# Patient Record
Sex: MALE | Race: WHITE | NOT HISPANIC OR LATINO
[De-identification: names, ages, dates, MRNs, and addresses within clinical notes are randomized per-mention and may not be internally consistent; named-entity substitution may affect disease eponyms.]

---

## 2020-01-01 ENCOUNTER — RESULT REVIEW (OUTPATIENT)
Age: 65
End: 2020-01-01

## 2020-01-01 ENCOUNTER — NON-APPOINTMENT (OUTPATIENT)
Age: 65
End: 2020-01-01

## 2020-01-01 ENCOUNTER — APPOINTMENT (OUTPATIENT)
Dept: HEMATOLOGY ONCOLOGY | Facility: CLINIC | Age: 65
End: 2020-01-01
Payer: COMMERCIAL

## 2020-01-01 ENCOUNTER — APPOINTMENT (OUTPATIENT)
Dept: RADIATION ONCOLOGY | Facility: CLINIC | Age: 65
End: 2020-01-01
Payer: COMMERCIAL

## 2020-01-01 VITALS
HEIGHT: 70 IN | SYSTOLIC BLOOD PRESSURE: 116 MMHG | BODY MASS INDEX: 33.21 KG/M2 | TEMPERATURE: 96.8 F | OXYGEN SATURATION: 98 % | WEIGHT: 232 LBS | DIASTOLIC BLOOD PRESSURE: 67 MMHG | RESPIRATION RATE: 18 BRPM | HEART RATE: 86 BPM

## 2020-01-01 VITALS
HEART RATE: 88 BPM | SYSTOLIC BLOOD PRESSURE: 130 MMHG | BODY MASS INDEX: 32.64 KG/M2 | TEMPERATURE: 97.8 F | DIASTOLIC BLOOD PRESSURE: 88 MMHG | HEIGHT: 70 IN | RESPIRATION RATE: 18 BRPM | OXYGEN SATURATION: 98 % | WEIGHT: 228 LBS

## 2020-01-01 PROCEDURE — 99214 OFFICE O/P EST MOD 30 MIN: CPT

## 2020-01-01 PROCEDURE — 99072 ADDL SUPL MATRL&STAF TM PHE: CPT

## 2020-01-01 PROCEDURE — 99215 OFFICE O/P EST HI 40 MIN: CPT

## 2020-07-13 ENCOUNTER — APPOINTMENT (OUTPATIENT)
Dept: HEART AND VASCULAR | Facility: CLINIC | Age: 65
End: 2020-07-13
Payer: COMMERCIAL

## 2020-07-13 VITALS — DIASTOLIC BLOOD PRESSURE: 84 MMHG | SYSTOLIC BLOOD PRESSURE: 142 MMHG | HEART RATE: 86 BPM

## 2020-07-13 DIAGNOSIS — I10 ESSENTIAL (PRIMARY) HYPERTENSION: ICD-10-CM

## 2020-07-13 PROBLEM — Z00.00 ENCOUNTER FOR PREVENTIVE HEALTH EXAMINATION: Status: ACTIVE | Noted: 2020-07-13

## 2020-07-13 PROCEDURE — 99205 OFFICE O/P NEW HI 60 MIN: CPT

## 2020-07-13 RX ORDER — LOSARTAN POTASSIUM AND HYDROCHLOROTHIAZIDE 25; 100 MG/1; MG/1
100-25 TABLET ORAL DAILY
Qty: 30 | Refills: 3 | Status: ACTIVE | COMMUNITY
Start: 2020-07-13 | End: 1900-01-01

## 2020-07-13 NOTE — PHYSICAL EXAM
[General Appearance - Well Developed] : well developed [Normal Appearance] : normal appearance [Well Groomed] : well groomed [General Appearance - Well Nourished] : well nourished [No Deformities] : no deformities [General Appearance - In No Acute Distress] : no acute distress [Normal Conjunctiva] : the conjunctiva exhibited no abnormalities [Eyelids - No Xanthelasma] : the eyelids demonstrated no xanthelasmas [Normal Oral Mucosa] : normal oral mucosa [No Oral Pallor] : no oral pallor [No Oral Cyanosis] : no oral cyanosis [Normal Jugular Venous A Waves Present] : normal jugular venous A waves present [Normal Jugular Venous V Waves Present] : normal jugular venous V waves present [No Jugular Venous Borrero A Waves] : no jugular venous borrero A waves [Heart Rate And Rhythm] : heart rate and rhythm were normal [Heart Sounds] : normal S1 and S2 [Murmurs] : no murmurs present [Respiration, Rhythm And Depth] : normal respiratory rhythm and effort [Exaggerated Use Of Accessory Muscles For Inspiration] : no accessory muscle use [Auscultation Breath Sounds / Voice Sounds] : lungs were clear to auscultation bilaterally [Abdomen Soft] : soft [Abdomen Tenderness] : non-tender [Abdomen Mass (___ Cm)] : no abdominal mass palpated [Abnormal Walk] : normal gait [Gait - Sufficient For Exercise Testing] : the gait was sufficient for exercise testing [Skin Color & Pigmentation] : normal skin color and pigmentation [No Venous Stasis] : no venous stasis [Skin Lesions] : no skin lesions [No Skin Ulcers] : no skin ulcer [No Xanthoma] : no  xanthoma was observed [Oriented To Time, Place, And Person] : oriented to person, place, and time [Affect] : the affect was normal [Mood] : the mood was normal [No Anxiety] : not feeling anxious [Nail Clubbing] : no clubbing of the fingernails [Cyanosis, Localized] : no localized cyanosis [Petechial Hemorrhages (___cm)] : no petechial hemorrhages [] : no ischemic changes

## 2020-07-20 NOTE — HISTORY OF PRESENT ILLNESS
[FreeTextEntry1] : 64 yo male with pmhx of htn and new RUL lung nodule X2  is here for initial visit with this provider for lightheadedness and palpitations.  \par \par Patient reports palpitations a couple of time a week. States it feels like its out of rhythm and lasts about a half an hour. Palpitations often relieved when he drinks a glass of wine.\par Reports feeling week and tired the past six weeks. \par Feels lightheaded approximately once a week when he first gets up, resolves quickly. \par Denies CP/SOB/syncope/orthopnea/LE edema\par Can walk  30-40 minutes a day without stopping. \par \par Is awaiting scheduling for biopsy of new RUL nodule. \par  \par Holter monitor ten years ago. \par \par \par \par PMHX\par lung nodule\par htn\par hld\par \par PSHx\par Right side hernia repair\par \par ALL\par none\par \par MEDS\par losartan hctz 50/12.5\par losartan 50mg daily (in addition to combo)\par atorvastatin 10mg daily\par \par FH\par father prostate ca\par mother dementia\par \par SH\par  1PPD X 40 years;quit four months ago\par Social ETOH\par No illicit drugs\par Works in IT\par \par EKG 7/13/2020\par SR with frequent PVC's, ventricular bigeminy, non specific ST depression\par \par Echo 6/30/2020\par 1. Probable normal left ventricular size and function.\par 2.Normal right ventricular size and function.\par 3. No pericardial effusion\par 4.Mild LVH\par 5.Insufficient TR to measure pulmonary pressures\par \par Carotid Ultrasound Duplex 7/13/2020\par 1.Mild plaque with no evidence of significant stenosis\par \par Labs 6/4/2020\par CR 1.03\par TSH 0.705\par \par Lipids 6/4/2020\par Tri 63\par Total chol 166\par HDL 59\par LDL 94\par

## 2020-07-20 NOTE — ASSESSMENT
[FreeTextEntry1] : 64 yo male with pmhx of htn and new RUL lung nodule X2  is here for initial visit with this provider for lightheadedness and palpitations.\par \par PALPITATIONS\par -in setting of symptoms, will order a Holter monitor for 72 hours to exclude arrhythmia, particularly in the setting of bigemini on EKG\par -recent echo reviewed, mild LVH\par -recommend to limit the amount of caffeine \par -TSH 0.705\par \par CAD \par -patient has multiple risk factors\par -echocardiogram is WNL\par -baseline EKG is NSR with frequent PVC's, ventricular bigeminy, non specific ST depression\par -continue atorvastatin 10mg daily\par -will obtain nuclear stress test to stratify for CAD, but will have a very low threshold for cardiac catheterization in the setting of risk factors and bigemini of EKG\par -recommend to call clinic immediately if onset of chest pain\par \par HTN\par -uncontrolled today\par -stop losartan 50\par -stop losartan/hctz 50/12.5\par -start losartan/ hctz to 100/25mg daily.\par -will consider adding third agent if BP not better controlled on next visit\par \par HLD\par -Lipid panel reviewed, WNL as above\par -recommended healthy diet and exercise\par -carotid ultrasound reviewed, WNL as above\par -continue atorvastatin 10mg daily\par \par f/u in 2 weeks for test results and BP check\par

## 2020-08-05 VITALS — BODY MASS INDEX: 33.46 KG/M2 | WEIGHT: 247 LBS | HEIGHT: 72 IN

## 2020-08-05 PROBLEM — Z87.891 FORMER HEAVY TOBACCO SMOKER: Status: ACTIVE | Noted: 2020-08-05

## 2020-08-05 PROBLEM — Z86.39 HISTORY OF HYPERLIPIDEMIA: Status: RESOLVED | Noted: 2020-08-05 | Resolved: 2020-08-05

## 2020-08-05 PROBLEM — Z87.438 HISTORY OF BENIGN PROSTATIC HYPERPLASIA: Status: RESOLVED | Noted: 2020-08-05 | Resolved: 2020-08-05

## 2020-08-05 PROBLEM — Z86.79 HISTORY OF ESSENTIAL HYPERTENSION: Status: RESOLVED | Noted: 2020-08-05 | Resolved: 2020-08-05

## 2020-08-10 ENCOUNTER — APPOINTMENT (OUTPATIENT)
Dept: THORACIC SURGERY | Facility: CLINIC | Age: 65
End: 2020-08-10
Payer: COMMERCIAL

## 2020-08-10 ENCOUNTER — TRANSCRIPTION ENCOUNTER (OUTPATIENT)
Age: 65
End: 2020-08-10

## 2020-08-10 DIAGNOSIS — Z86.39 PERSONAL HISTORY OF OTHER ENDOCRINE, NUTRITIONAL AND METABOLIC DISEASE: ICD-10-CM

## 2020-08-10 DIAGNOSIS — Z87.438 PERSONAL HISTORY OF OTHER DISEASES OF MALE GENITAL ORGANS: ICD-10-CM

## 2020-08-10 DIAGNOSIS — Z86.79 PERSONAL HISTORY OF OTHER DISEASES OF THE CIRCULATORY SYSTEM: ICD-10-CM

## 2020-08-10 DIAGNOSIS — Z87.891 PERSONAL HISTORY OF NICOTINE DEPENDENCE: ICD-10-CM

## 2020-08-10 PROCEDURE — 99205 OFFICE O/P NEW HI 60 MIN: CPT

## 2020-08-11 NOTE — PHYSICAL EXAM
[Examination Of The Chest] : the chest was normal in appearance [Chest Visual Inspection Thoracic Asymmetry] : no chest asymmetry [Diminished Respiratory Excursion] : normal chest expansion

## 2020-08-11 NOTE — HISTORY OF PRESENT ILLNESS
[FreeTextEntry1] : 64 y/o M pmhx HTN, HLD, BPH, former smoker (quit 2 months ago) referred to our office for surgical evaluation  after CT chest revealed 2 RUL nodules (measuring 1.3 cm and 1.1 cm).  \par \par He initially underwent screening CXR  because of his smoking history which showed RUL lung nodule.  He subsequently underwent CT chest & PET demonstrating the hypermetabolic nodules and was referred\par \par patient is asymptomatic however former smoker.  Denies weight loss, weakness, fever, chills, decreased appetite, sob, hemoptysis

## 2020-08-11 NOTE — ASSESSMENT
[FreeTextEntry1] : 66 y/o M with new finding of hypermetabolic lung nodules.  Patient is a former smoker and there is high suspicion for malignancy.  On pet ct, there is hilar uptake concerning for n1 lymph node.  We discussed options of surgery v. IR biopsy.  This lesion is not amenable to wedge resection, I would like a diagnosis before surgical excision if possible.  At this time, patient has been instructed to undergo IR guided needle biopsy at [place] to determine pathologic diagnosis.  I have discussed it with the interventional radiologist.  Patient is extremely anxious and will be prescribed lorazepam for a short time.  HE will follow up in our office with the results of this biopsy.  At that time, we will determine options for surgical management.  He will need pulmonary function tests as well as cardiac clearance.  He can contact our office with any questions or concerns going forward.

## 2020-08-11 NOTE — DATA REVIEWED
[FreeTextEntry1] : 6/23/2020\par PET/CT:  Mild focal uptake in the 2 adjacent pulmonary nodules in the posterior RUL (max SUV 4.7).  No additional FDG avid pulmonary nodules identified.  Focally avid right hilar lymph node (max SUV 8.6).  No abnormal uptake in the nonenlarged precarinal lymph node.  Moderate sized hiatal hernia.  No evidence of distant metastases\par \par 5/28/2020\par CT Chest:  Moderate to extensive centrilobular paraseptal emphysema predominantly within bilateral upper lobes.  There are 2 adjacent nodular densities noted within the RUL measuring 1.3 cm and 1.1 cm.  Evaluation of the rest of the lung fields demonstrates mild subsegmental atelectasis and scaring involving the lingula.  There is a 0.2 cm peripheral nodule note in the RLL . Also, there is a 0.2 cm nodule noted involving the superior segment of the RLL.  There is also a central nodule noted involving the EMBER which measures 0.3 cm in sized.  There are no spiculated nodules.  There is no consolidation.

## 2020-08-21 ENCOUNTER — RESULT REVIEW (OUTPATIENT)
Age: 65
End: 2020-08-21

## 2020-08-23 ENCOUNTER — RESULT REVIEW (OUTPATIENT)
Age: 65
End: 2020-08-23

## 2020-08-24 ENCOUNTER — RESULT REVIEW (OUTPATIENT)
Age: 65
End: 2020-08-24

## 2020-08-31 ENCOUNTER — RESULT REVIEW (OUTPATIENT)
Age: 65
End: 2020-08-31

## 2020-08-31 ENCOUNTER — APPOINTMENT (OUTPATIENT)
Dept: THORACIC SURGERY | Facility: CLINIC | Age: 65
End: 2020-08-31
Payer: COMMERCIAL

## 2020-08-31 ENCOUNTER — APPOINTMENT (OUTPATIENT)
Dept: HEMATOLOGY ONCOLOGY | Facility: CLINIC | Age: 65
End: 2020-08-31
Payer: COMMERCIAL

## 2020-08-31 PROCEDURE — 99214 OFFICE O/P EST MOD 30 MIN: CPT

## 2020-08-31 PROCEDURE — 99205 OFFICE O/P NEW HI 60 MIN: CPT

## 2020-08-31 RX ORDER — AMLODIPINE BESYLATE 5 MG/1
5 TABLET ORAL
Refills: 0 | Status: ACTIVE | COMMUNITY

## 2020-08-31 NOTE — PHYSICAL EXAM
[Sclera] : the sclera and conjunctiva were normal [PERRL With Normal Accommodation] : pupils were equal in size, round, and reactive to light [Extraocular Movements] : extraocular movements were intact [Neck Appearance] : the appearance of the neck was normal [Neck Cervical Mass (___cm)] : no neck mass was observed [Jugular Venous Distention Increased] : there was no jugular-venous distention [Thyroid Diffuse Enlargement] : the thyroid was not enlarged [Thyroid Nodule] : there were no palpable thyroid nodules [Heart Rate And Rhythm] : heart rate was normal and rhythm regular [Auscultation Breath Sounds / Voice Sounds] : lungs were clear to auscultation bilaterally [Heart Sounds Gallop] : no gallops [Heart Sounds] : normal S1 and S2 [Murmurs] : no murmurs [Heart Sounds Pericardial Friction Rub] : no pericardial rub [Abdomen Soft] : soft [Bowel Sounds] : normal bowel sounds [Abdomen Tenderness] : non-tender [Cervical Lymph Nodes Enlarged Posterior Bilaterally] : posterior cervical [Abdomen Mass (___ Cm)] : no abdominal mass palpated [Cervical Lymph Nodes Enlarged Anterior Bilaterally] : anterior cervical [Axillary Lymph Nodes Enlarged Bilaterally] : axillary [Supraclavicular Lymph Nodes Enlarged Bilaterally] : supraclavicular [Femoral Lymph Nodes Enlarged Bilaterally] : femoral [Inguinal Lymph Nodes Enlarged Bilaterally] : inguinal [No CVA Tenderness] : no ~M costovertebral angle tenderness [No Spinal Tenderness] : no spinal tenderness [Abnormal Walk] : normal gait [Nail Clubbing] : no clubbing  or cyanosis of the fingernails [Musculoskeletal - Swelling] : no joint swelling seen [Motor Tone] : muscle strength and tone were normal [Skin Color & Pigmentation] : normal skin color and pigmentation [Skin Turgor] : normal skin turgor [] : no rash [Oriented To Time, Place, And Person] : oriented to person, place, and time [Impaired Insight] : insight and judgment were intact [Affect] : the affect was normal

## 2020-08-31 NOTE — ASSESSMENT
[FreeTextEntry1] : Small cell lung cancer\par Likely limited stage pending brain MRI\par At least Stage IIB\par PET/CT (6/20) No distant metastases\par \par Discussed at length about the diagnosis, work up, staging, treatment options and prognosis\par Explained that he is going to have a good response to the treatment, however there is very high chance of recurrence. And 5 year survival is <20%\par He and his wife had multiple questions which were answered to satisfaction\par Plan\par BRain MRI\par Chemoport placement. Hold ASA 5 days prior to the port placement\par Refer to rad onc\par Cisplatin/carboplatin with etoposide Q3W x 4-6 treatment\par Given the lapse since the initial findings, will begin chemotherapy. Radiation can begin as early as feasible\par Refer for audiometry

## 2020-08-31 NOTE — CONSULT LETTER
[Dear  ___] : Dear  [unfilled], [Consult Letter:] : I had the pleasure of evaluating your patient, [unfilled]. [Please see my note below.] : Please see my note below. [Consult Closing:] : Thank you very much for allowing me to participate in the care of this patient.  If you have any questions, please do not hesitate to contact me. [Sincerely,] : Sincerely, [DrSudhakar  ___] : Dr. GOLDMAN [FreeTextEntry3] : Javier Sanabria MD, MPH\par Attending Physician\par Hematology Oncology\par [facility] [place]\par [facility]\par

## 2020-08-31 NOTE — HISTORY OF PRESENT ILLNESS
[de-identified] : Mr Potter is a very pleasant 65 years ex smoker (quit 6 months ago, 1 ppd x 40 years), seen today for further management of his small cell lung cancer\par \par He reports "flu like symptoms" starting in March 2020, saw his PCP who obtained a CXR which showed a lung nodule, subsequently had  CT chest revealed 2 RUL nodules (measuring 1.3 cm and 1.1 cm). He was sent to [facility] for biopsy, He reports that his procedure was postponed for multiple weeks, and eventually he was told that he need to see a thoracic surgeon\par He saw Dr Sarthak Fernández, who referred him to IR for image guided biopsy\par \par CT Chest 5/28/2020: Moderate to extensive centrilobular paraseptal emphysema predominantly within bilateral upper lobes. There are 2 adjacent nodular densities noted within the RUL measuring 1.3 cm and 1.1 cm. Evaluation of the rest of the lung fields demonstrates mild subsegmental atelectasis and scaring involving the lingula. There is a 0.2 cm peripheral nodule note in the RLL. Also, there is a 0.2 cm nodule noted involving the superior segment of the RLL. There is also a central nodule noted involving the EMBER which measures 0.3 cm in sized. There are no spiculated nodules. There is no consolidation. \par \par PET/CT 6/23/2020: Mild focal uptake in the 2 adjacent pulmonary nodules in the posterior RUL (max SUV 4.7). No additional FDG avid pulmonary nodules identified. Focally avid right hilar lymph node (max SUV 8.6). No abnormal uptake in the nonenlarged precarinal lymph node. Moderate sized hiatal hernia. No evidence of distant metastases\par \par s/p core biopsy of a right upper lobe lung mass (8/24/20)\par Pathology\par Small cell carcinoma. 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 He also reports that his house is from early 1900s and may have asbestos in the basement\par \par No fevers chills night sweats\par No fatigue, tiredness, apathy\par No appetite loss or weight loss/weight gain\par No chest pain, shortness of breath, palpitation, dizziness\par No abdominal pain, nausea, vomiting, diarrhea, constipation\par No bright red blood per rectum, hematemesis or melena\par No hematuria, dysuria, frequency, urgency\par No headaches, visual changes, focal weakness, tingling, numbness\par

## 2020-08-31 NOTE — PHYSICAL EXAM
[Restricted in physically strenuous activity but ambulatory and able to carry out work of a light or sedentary nature] : Status 1- Restricted in physically strenuous activity but ambulatory and able to carry out work of a light or sedentary nature, e.g., light house work, office work [Normal] : grossly intact [de-identified] : Anxious

## 2020-09-01 NOTE — DATA REVIEWED
[FreeTextEntry1] : IR guided biopsy 8/24/2020:  RUL  (+) small cell carcinoma.  \par \par 6/23/2020\par PET/CT: Mild focal uptake in the 2 adjacent pulmonary nodules in the posterior RUL (max SUV 4.7). No additional FDG avid pulmonary nodules identified. Focally avid right hilar lymph node (max SUV 8.6). No abnormal uptake in the nonenlarged precarinal lymph node. Moderate sized hiatal hernia. No evidence of distant metastases\par \par 5/28/2020\par CT Chest: Moderate to extensive centrilobular paraseptal emphysema predominantly within bilateral upper lobes. There are 2 adjacent nodular densities noted within the RUL measuring 1.3 cm and 1.1 cm. Evaluation of the rest of the lung fields demonstrates mild subsegmental atelectasis and scaring involving the lingula. There is a 0.2 cm peripheral nodule note in the RLL. Also, there is a 0.2 cm nodule noted involving the superior segment of the RLL. There is also a central nodule noted involving the EMBER which measures 0.3 cm in sized. There are no spiculated nodules. There is no consolidation.

## 2020-09-01 NOTE — CONSULT LETTER
[Dear  ___] : Dear  [unfilled], [Consult Letter:] : I had the pleasure of evaluating your patient, [unfilled]. [Please see my note below.] : Please see my note below. [Consult Closing:] : Thank you very much for allowing me to participate in the care of this patient.  If you have any questions, please do not hesitate to contact me. [Sincerely,] : Sincerely, [DrSudhakar  ___] : Dr. GOLDMAN [FreeTextEntry3] : Sarthak Barnes MD\par Thoracic Surgery\par [facility]

## 2020-09-01 NOTE — ASSESSMENT
[FreeTextEntry1] : 66 y/o M pmhx HTN, HLD, BPH, former smoker, recently diagnosed with small cell lung ca of right upper lobe. PET scan in June demonstrated FDG avid right hilar lymph node representing metastatic disease.  Patient has been referred to oncologist for medical management.  He will be scheduled for repeat PET and Brain MRI for further staging. He can contact our office with any questions or concerns going forward.

## 2020-09-01 NOTE — HISTORY OF PRESENT ILLNESS
[FreeTextEntry1] : 64 y/o M pmhx HTN, HLD, BPH, former smoker (quit 2 months ago) referred to our office for surgical evaluation after CT chest revealed 2 RUL nodules (measuring 1.3 cm and 1.1 cm). \par \par He initially presented to our office on Aug 10 after screening CXR showed RUL lung nodule. He subsequently underwent CT chest & PET demonstrating the hypermetabolic nodules. At that appointment patient was  patient has been instructed to undergo IR guided needle biopsy to determine pathologic diagnosis.  He had IR guided biopsy on 8/24 which demonstrated small cell lung ca. \par \par He now presents to the office to discuss further management.  Patient is asymptomatic however former smoker. Denies weight loss, weakness, fever, chills, decreased appetite, sob, hemoptysis  I, Gabriel Hoyos, performed the initial face to face bedside interview with this patient regarding history of present illness, review of symptoms and relevant past medical, social and family history.  I completed an independent physical examination.  I was the initial provider who evaluated this patient.  The history, relevant review of systems, past medical and surgical history, medical decision making, and physical examination was documented by the scribe in my presence and I attest to the accuracy of the documentation.

## 2020-09-03 ENCOUNTER — APPOINTMENT (OUTPATIENT)
Dept: RADIATION ONCOLOGY | Facility: CLINIC | Age: 65
End: 2020-09-03
Payer: COMMERCIAL

## 2020-09-03 DIAGNOSIS — Z80.42 FAMILY HISTORY OF MALIGNANT NEOPLASM OF PROSTATE: ICD-10-CM

## 2020-09-03 PROCEDURE — 99205 OFFICE O/P NEW HI 60 MIN: CPT | Mod: 95

## 2020-09-08 ENCOUNTER — RESULT REVIEW (OUTPATIENT)
Age: 65
End: 2020-09-08

## 2020-09-09 NOTE — HISTORY OF PRESENT ILLNESS
[Home] : at home, [unfilled] , at the time of the visit. [Medical Office: ([facility])___] : at the medical office located in  [Verbal consent obtained from patient] : the patient, [unfilled] [FreeTextEntry1] : \par Mr. Potter is a 65 year old male, who was recently diagnosed with limited stage small cell carcinoma, he is being referred for a consultation to discuss the role of radiation therapy and the management of the disease. \par \par Mr. Potter initially presented to his PCP with complaints of flu like symptoms.  Further evaluation with a screening CXR (report unavailable) demonstrated a nodule in the right upper lobe (RUL). CT chest (5/28/20) revealed a few parenchymal nodules with the largest one located in the posterior RUL, measuring 1.3 cm and 1.1 cm.  PET/CT (6/23/20) demonstrated 2 adjacent pulmonary nodules in the RUL (SUV 4.7). FDG avid right hilar lymph node (max SUV 8.6). No evidence of distant metastases. Mr. Potter was scheduled to undergo a biopsy of the nodules at [facility], however, due to COVID his biopsy was cancelled. He presented to [place] and a CT guided biopsy was performed (8/24/20) and pathology revealed small cell carcinoma. MRI Brain on 9/8 was negative for any evidence of metastatic disease. \par \par He was referred to Dr. Sanabria for further management and the plan is to give concurrent chemoradiation using Cisplatin/Carboplatin with etoposide every 3 weeks x 4-6 cycles. The first cycle is planned for 9/16. He is an ex smoker, 1 - 1.5 ppd x 40 years and quit in March 2020.  Mr. Potter notes that his appetite is fair and reports no weight loss. He feels overwhelmed and anxious at times given his diagnosis. He also notes that he is drinking more alcohol since the diagnosis.  Mr Potter is being seen via telehealth to further discuss management with radiation therapy.

## 2020-09-09 NOTE — LETTER CLOSING
[Sincerely yours,] : Sincerely yours, [Consult Closing:] : Thank you for allowing me to participate in the care of this patient.  If you have any questions, please do not hesitate to contact me. [FreeTextEntry3] : Nadine Hidalgo MD\par

## 2020-09-09 NOTE — SOCIAL HISTORY
[Former  Cigarette ___ Pack(s) per day] : former cigarette pack(s) per day:  [unfilled]  [Date (Year) Stopped: _____] : Date (Year) Stopped: [unfilled]  [de-identified] : quit 6 months ago

## 2020-09-09 NOTE — DISEASE MANAGEMENT
[Clinical] : TNM Stage: c [IIB] : IIB [NTNM] : 1 [FreeTextEntry4] : Limited stage  [TTNM] : 1b [MTNM] : X

## 2020-09-10 ENCOUNTER — APPOINTMENT (OUTPATIENT)
Dept: RADIATION ONCOLOGY | Facility: CLINIC | Age: 65
End: 2020-09-10
Payer: COMMERCIAL

## 2020-09-10 VITALS
DIASTOLIC BLOOD PRESSURE: 89 MMHG | BODY MASS INDEX: 33.86 KG/M2 | SYSTOLIC BLOOD PRESSURE: 146 MMHG | OXYGEN SATURATION: 98 % | WEIGHT: 250 LBS | RESPIRATION RATE: 18 BRPM | HEIGHT: 72 IN | HEART RATE: 84 BPM

## 2020-09-10 PROCEDURE — 99214 OFFICE O/P EST MOD 30 MIN: CPT

## 2020-09-14 NOTE — HISTORY OF PRESENT ILLNESS
[FreeTextEntry1] : \par Mr Potter is a 65 year old male recently diagnosed with limited stage small cell carcinoma, present today to proceed with radiation therapy planning. \par \par He had a CXR (report unavailable) that demonstrated a nodule in the right upper lobe (RUL). CT chest (5/28/20) revealed a few parenchymal nodules with the largest one located in the posterior RUL, measuring 1.3 cm and 1.1 cm.  PET/CT (6/23/20) demonstrated 2 adjacent pulmonary nodules in the RUL (SUV 4.7). FDG avid right hilar lymph node (max SUV 8.6). No evidence of distant metastases.  CT guided biopsy was performed (8/24/20) and pathology revealed small cell carcinoma. MRI Brain on 9/8 was negative for any evidence of metastatic disease. The plan is for Cisplatin/Carboplatin with etoposide every 3 weeks x 4-6 cycles, first cycle on 9/16/20 to be given with concurrent radiation.  He reports a recent decrease in appetite but no weight loss. He attributes it to his anxiety of the diagnosis. He otherwise offers no new symptoms or complaints. He is present today for physical exam status post telehealth and CT simulation.

## 2020-09-14 NOTE — SOCIAL HISTORY
[Former  Cigarette ___ Pack(s) per day] : former cigarette pack(s) per day:  [unfilled]  [Date (Year) Stopped: _____] : Date (Year) Stopped: [unfilled]  [de-identified] : quit 6 months ago

## 2020-09-14 NOTE — DISEASE MANAGEMENT
[Clinical] : TNM Stage: c [IIB] : IIB [FreeTextEntry4] : Limited stage  [TTNM] : 1b [NTNM] : 1 [MTNM] : X

## 2020-09-14 NOTE — PHYSICAL EXAM
[] : no respiratory distress [Respiration, Rhythm And Depth] : normal respiratory rhythm and effort [Auscultation Breath Sounds / Voice Sounds] : lungs were clear to auscultation bilaterally [Normal] : no focal deficits

## 2020-09-16 ENCOUNTER — APPOINTMENT (OUTPATIENT)
Dept: HEMATOLOGY ONCOLOGY | Facility: CLINIC | Age: 65
End: 2020-09-16
Payer: COMMERCIAL

## 2020-09-16 VITALS
BODY MASS INDEX: 33.86 KG/M2 | TEMPERATURE: 97.9 F | HEART RATE: 87 BPM | DIASTOLIC BLOOD PRESSURE: 81 MMHG | RESPIRATION RATE: 18 BRPM | OXYGEN SATURATION: 98 % | HEIGHT: 71.97 IN | SYSTOLIC BLOOD PRESSURE: 143 MMHG | WEIGHT: 250 LBS

## 2020-09-16 PROCEDURE — 99215 OFFICE O/P EST HI 40 MIN: CPT

## 2020-09-16 NOTE — CONSULT LETTER
[Dear  ___] : Dear  [unfilled], [Please see my note below.] : Please see my note below. [Consult Closing:] : Thank you very much for allowing me to participate in the care of this patient.  If you have any questions, please do not hesitate to contact me. [Sincerely,] : Sincerely, [DrSudhakar  ___] : Dr. GOLDMAN [Courtesy Letter:] : I had the pleasure of seeing your patient, [unfilled], in my office today. [FreeTextEntry3] : Javier Sanabria MD, MPH\par Attending Physician\par Hematology Oncology\par [facility] [place]\par [facility]\par

## 2020-09-16 NOTE — HISTORY OF PRESENT ILLNESS
[de-identified] : Mr Potter is a very pleasant 65 years ex smoker (quit 6 months ago, 1 ppd x 40 years), seen today for further management of his small cell lung cancer\par \par He reports "flu like symptoms" starting in March 2020, saw his PCP who obtained a CXR which showed a lung nodule, subsequently had  CT chest revealed 2 RUL nodules (measuring 1.3 cm and 1.1 cm). He was sent to [facility] for biopsy, He reports that his procedure was postponed for multiple weeks, and eventually he was told that he need to see a thoracic surgeon\par He saw Dr Sarthak Fernández, who referred him to IR for image guided biopsy\par \par CT Chest 5/28/2020: Moderate to extensive centrilobular paraseptal emphysema predominantly within bilateral upper lobes. There are 2 adjacent nodular densities noted within the RUL measuring 1.3 cm and 1.1 cm. Evaluation of the rest of the lung fields demonstrates mild subsegmental atelectasis and scaring involving the lingula. There is a 0.2 cm peripheral nodule note in the RLL. Also, there is a 0.2 cm nodule noted involving the superior segment of the RLL. There is also a central nodule noted involving the EMBER which measures 0.3 cm in sized. There are no spiculated nodules. There is no consolidation. \par \par PET/CT 6/23/2020: Mild focal uptake in the 2 adjacent pulmonary nodules in the posterior RUL (max SUV 4.7). No additional FDG avid pulmonary nodules identified. Focally avid right hilar lymph node (max SUV 8.6). No abnormal uptake in the nonenlarged precarinal lymph node. Moderate sized hiatal hernia. No evidence of distant metastases\par \par s/p core biopsy of a right upper lobe lung mass (8/24/20)\par Pathology\par Small cell carcinoma. 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 He also reports that his house is from early 1900s and may have asbestos in the basement\par \par No fevers chills night sweats\par No fatigue, tiredness, apathy\par No appetite loss or weight loss/weight gain\par No chest pain, shortness of breath, palpitation, dizziness\par No abdominal pain, nausea, vomiting, diarrhea, constipation\par No bright red blood per rectum, hematemesis or melena\par No hematuria, dysuria, frequency, urgency\par No headaches, visual changes, focal weakness, tingling, numbness\par  [de-identified] : He is seen today for follow up and begin C1 cisplatin etoposide (9/16/20)\par \par He feels good overall\par Xanax helps with anxiety\par \par He has not had chemoport placement \par \par MRI brain (9/8/20)\par No evidence for intracranial metastatic disease.\par No acute infarction, acute intracranial hemorrhage, hydrocephalus or extra-axial fluid collection.\par Mild chronic microvascular ischemic changes.\par Mild opacification of the left mastoid air cells.\par \par

## 2020-09-16 NOTE — PHYSICAL EXAM
[Restricted in physically strenuous activity but ambulatory and able to carry out work of a light or sedentary nature] : Status 1- Restricted in physically strenuous activity but ambulatory and able to carry out work of a light or sedentary nature, e.g., light house work, office work [Normal] : grossly intact [de-identified] : Anxious

## 2020-09-16 NOTE — ASSESSMENT
[FreeTextEntry1] : Small cell lung cancer\par Likely limited stage pending brain MRI\par At least Stage IIB\par PET/CT (6/20) No distant metastases\par Brain MRI- No mets\par \par He is seen today for follow up and begin chemo\par I have reviewed the risks, benefits and side effects of chemotherapy with the patient. All questions were answered to satisfaction. Patient agrees to pursue the planned chemotherapy.\par Orders entered\par Zofran PRN for nausea\par Plan\par Cisplatin with etoposide Q3W x 4-6 treatment\par No neulasta as he will begin radiation\par Seen by audiometry\par \par Follow up in 1 week for yareli labs and 3 weeks for C2\par CBC, CMP

## 2020-09-19 ENCOUNTER — RESULT REVIEW (OUTPATIENT)
Age: 65
End: 2020-09-19

## 2020-09-22 ENCOUNTER — RESULT REVIEW (OUTPATIENT)
Age: 65
End: 2020-09-22

## 2020-09-23 ENCOUNTER — RESULT REVIEW (OUTPATIENT)
Age: 65
End: 2020-09-23

## 2020-09-23 ENCOUNTER — APPOINTMENT (OUTPATIENT)
Dept: HEMATOLOGY ONCOLOGY | Facility: CLINIC | Age: 65
End: 2020-09-23
Payer: COMMERCIAL

## 2020-09-23 VITALS
OXYGEN SATURATION: 96 % | DIASTOLIC BLOOD PRESSURE: 85 MMHG | RESPIRATION RATE: 18 BRPM | WEIGHT: 247.1 LBS | HEIGHT: 71.97 IN | BODY MASS INDEX: 33.47 KG/M2 | TEMPERATURE: 99.5 F | HEART RATE: 91 BPM | SYSTOLIC BLOOD PRESSURE: 129 MMHG

## 2020-09-23 PROCEDURE — 99214 OFFICE O/P EST MOD 30 MIN: CPT

## 2020-09-23 RX ORDER — ALPRAZOLAM 1 MG/1
1 TABLET ORAL
Qty: 90 | Refills: 0 | Status: DISCONTINUED | COMMUNITY
Start: 2020-08-31 | End: 2020-09-23

## 2020-09-23 NOTE — CONSULT LETTER
[Dear  ___] : Dear  [unfilled], [Courtesy Letter:] : I had the pleasure of seeing your patient, [unfilled], in my office today. [Please see my note below.] : Please see my note below. [Consult Closing:] : Thank you very much for allowing me to participate in the care of this patient.  If you have any questions, please do not hesitate to contact me. [Sincerely,] : Sincerely, [DrSudhakar  ___] : Dr. GOLDMAN [FreeTextEntry3] : Javier Sanabria MD, MPH\par Attending Physician\par Hematology Oncology\par [facility] [place]\par [facility]\par

## 2020-09-23 NOTE — ASSESSMENT
[FreeTextEntry1] : Small cell lung cancer - stage IIB\par Likely limited stage pending brain MRI\par At least Stage IIB\par PET/CT (6/20) No distant metastases\par 9/8/20 Brain MRI- No mets\par \par Treatment: cisplatin etoposide (9/16/20 - present ) - s/p C1\par Zofran PRN for nausea\par post chemo fatigue - will start patient on Decadron x 2 days after next treatment\par Radiation with Dr. Hidalgo - 9/24/20 \par \par Plan\par Cisplatin with etoposide Q3W x 4-6 treatment\par No neulasta as he will begin radiation\par Seen by audiometry\par \par Follow up in 2 wks C2\par CBC, CMP

## 2020-09-23 NOTE — PHYSICAL EXAM
[Restricted in physically strenuous activity but ambulatory and able to carry out work of a light or sedentary nature] : Status 1- Restricted in physically strenuous activity but ambulatory and able to carry out work of a light or sedentary nature, e.g., light house work, office work [Normal] : grossly intact [de-identified] : Anxious

## 2020-09-23 NOTE — HISTORY OF PRESENT ILLNESS
[de-identified] : Mr Potter is a very pleasant 65 years ex smoker (quit 6 months ago, 1 ppd x 40 years), seen today for further management of his small cell lung cancer\par \par He reports "flu like symptoms" starting in March 2020, saw his PCP who obtained a CXR which showed a lung nodule, subsequently had  CT chest revealed 2 RUL nodules (measuring 1.3 cm and 1.1 cm). He was sent to [facility] for biopsy, He reports that his procedure was postponed for multiple weeks, and eventually he was told that he need to see a thoracic surgeon\par He saw Dr Sarthak Fernández, who referred him to IR for image guided biopsy\par \par CT Chest 5/28/2020: Moderate to extensive centrilobular paraseptal emphysema predominantly within bilateral upper lobes. There are 2 adjacent nodular densities noted within the RUL measuring 1.3 cm and 1.1 cm. Evaluation of the rest of the lung fields demonstrates mild subsegmental atelectasis and scaring involving the lingula. There is a 0.2 cm peripheral nodule note in the RLL. Also, there is a 0.2 cm nodule noted involving the superior segment of the RLL. There is also a central nodule noted involving the EMBER which measures 0.3 cm in sized. There are no spiculated nodules. There is no consolidation. \par \par PET/CT 6/23/2020: Mild focal uptake in the 2 adjacent pulmonary nodules in the posterior RUL (max SUV 4.7). No additional FDG avid pulmonary nodules identified. Focally avid right hilar lymph node (max SUV 8.6). No abnormal uptake in the nonenlarged precarinal lymph node. Moderate sized hiatal hernia. No evidence of distant metastases\par \par s/p core biopsy of a right upper lobe lung mass (8/24/20)\par Pathology\par Small cell carcinoma. 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 He also reports that his house is from early 1900s and may have asbestos in the basement\par \par No fevers chills night sweats\par No fatigue, tiredness, apathy\par No appetite loss or weight loss/weight gain\par No chest pain, shortness of breath, palpitation, dizziness\par No abdominal pain, nausea, vomiting, diarrhea, constipation\par No bright red blood per rectum, hematemesis or melena\par No hematuria, dysuria, frequency, urgency\par No headaches, visual changes, focal weakness, tingling, numbness\par  [de-identified] : He is seen today for yareli follow up \par Treatment with  cisplatin etoposide (9/16/20 - present ) - s/p C1\par \par Patient reports of severe fatigue that lasted for three days after his last chemo treatment, now slowly better. He got radiation simulation with Dr. Hidalgo earlier today and to start radiation tomorrow.  Denies n/v, fever, headaches, dizziness, chest pain/palpitaiton, rash, bleeding. \par \par MRI brain (9/8/20)\par No evidence for intracranial metastatic disease.\par No acute infarction, acute intracranial hemorrhage, hydrocephalus or extra-axial fluid collection.\par Mild chronic microvascular ischemic changes.\par Mild opacification of the left mastoid air cells.\par \par

## 2020-09-29 NOTE — REVIEW OF SYSTEMS
[Esophagitis: Grade 0] : Esophagitis: Grade 0 [Nausea: Grade 0] : Nausea: Grade 0 [Fatigue: Grade 2 - Fatigue not relieved by rest; limiting instrumental ADL] : Fatigue: Grade 2 - Fatigue not relieved by rest; limiting instrumental ADL [Mucositis Oral: Grade 1 - Asymptomatic or mild symptoms; intervention not indicated] : Mucositis Oral: Grade 1 - Asymptomatic or mild symptoms; intervention not indicated [Dyspnea: Grade 0] : Dyspnea: Grade 0 [Hiccups: Grade 0] : Hiccups: Grade 0 [Hoarseness: Grade 0] : Hoarseness: Grade 0 [Dermatitis Radiation: Grade 0] : Dermatitis Radiation: Grade 0

## 2020-10-01 NOTE — HISTORY OF PRESENT ILLNESS
[FreeTextEntry1] : Mr. Potter is status post fraction 3/33 of radiation to the right lung. He is receiving concurrent chemotherapy with cisplatin etoposide. He reports severe fatigue status post chemotherapy times 3 -4 days. He has prophagically started baking soda saltwater rinses daily. His appetite remains fair; he is eating small meals. Denies any other new symptoms at this time.

## 2020-10-01 NOTE — DISEASE MANAGEMENT
[Clinical] : TNM Stage: c [IIB] : IIB [TTNM] : 1b [NTNM] : 1 [MTNM] : X [de-identified] : 800 cGy [de-identified] : 6600 cGy [de-identified] : right lung

## 2020-10-06 VITALS
OXYGEN SATURATION: 98 % | HEART RATE: 86 BPM | RESPIRATION RATE: 18 BRPM | WEIGHT: 248 LBS | DIASTOLIC BLOOD PRESSURE: 82 MMHG | HEIGHT: 71 IN | SYSTOLIC BLOOD PRESSURE: 126 MMHG | BODY MASS INDEX: 34.72 KG/M2

## 2020-10-07 ENCOUNTER — RESULT REVIEW (OUTPATIENT)
Age: 65
End: 2020-10-07

## 2020-10-07 ENCOUNTER — APPOINTMENT (OUTPATIENT)
Dept: HEMATOLOGY ONCOLOGY | Facility: CLINIC | Age: 65
End: 2020-10-07
Payer: COMMERCIAL

## 2020-10-07 VITALS
OXYGEN SATURATION: 97 % | DIASTOLIC BLOOD PRESSURE: 79 MMHG | HEIGHT: 71 IN | SYSTOLIC BLOOD PRESSURE: 121 MMHG | RESPIRATION RATE: 18 BRPM | BODY MASS INDEX: 34.96 KG/M2 | TEMPERATURE: 96.5 F | HEART RATE: 85 BPM | WEIGHT: 249.7 LBS

## 2020-10-07 PROCEDURE — 99215 OFFICE O/P EST HI 40 MIN: CPT

## 2020-10-07 NOTE — HISTORY OF PRESENT ILLNESS
[FreeTextEntry1] : Mr. Potter is status post fraction 9 / 33 of radiation to the right lung. He report is is feeling well. His appetite is good and weight is stable (248 lbs today). Denies any shortness of breath or fatigue. He reports he will receive C2 of chemotherapy tomorrow.

## 2020-10-07 NOTE — ASSESSMENT
[FreeTextEntry1] : Small cell lung cancer - stage IIB\par Likely limited stage pending brain MRI\par At least Stage IIB\par PET/CT (6/20) No distant metastases\par 9/8/20 Brain MRI- No mets\par \par Treatment: cisplatin etoposide (9/16/20 - present ) - For C2\par Zofran PRN for nausea\par Radiation with Dr. Hidalgo - 9/23/20 - present\par Discussed about the side effects of concurrent chemoradiation including mouth sores, cytopenias etc\par Plan\par Cisplatin with etoposide Q3W x 4-6 treatment\par No neulasta as he  is getting radiation\par Seen by audiometry\par \par Chemotherapy induced agranulocytosis\par No neulasta as he  is getting radiation\par Monitor symptoms, fever etc\par \par Tinnitus\par Audiogram prior to next treatment. If worsen, will switch cisplatin to carboplatin\par \par Follow up in 2 wks C2\par CBC, CMP

## 2020-10-07 NOTE — HISTORY OF PRESENT ILLNESS
[de-identified] : Mr Potter is a very pleasant 65 years ex smoker (quit 6 months ago, 1 ppd x 40 years), seen today for further management of his small cell lung cancer\par \par He reports "flu like symptoms" starting in March 2020, saw his PCP who obtained a CXR which showed a lung nodule, subsequently had  CT chest revealed 2 RUL nodules (measuring 1.3 cm and 1.1 cm). He was sent to [facility] for biopsy, He reports that his procedure was postponed for multiple weeks, and eventually he was told that he need to see a thoracic surgeon\par He saw Dr Sarthak Fernández, who referred him to IR for image guided biopsy\par \par CT Chest 5/28/2020: Moderate to extensive centrilobular paraseptal emphysema predominantly within bilateral upper lobes. There are 2 adjacent nodular densities noted within the RUL measuring 1.3 cm and 1.1 cm. Evaluation of the rest of the lung fields demonstrates mild subsegmental atelectasis and scaring involving the lingula. There is a 0.2 cm peripheral nodule note in the RLL. Also, there is a 0.2 cm nodule noted involving the superior segment of the RLL. There is also a central nodule noted involving the EMBER which measures 0.3 cm in sized. There are no spiculated nodules. There is no consolidation. \par \par PET/CT 6/23/2020: Mild focal uptake in the 2 adjacent pulmonary nodules in the posterior RUL (max SUV 4.7). No additional FDG avid pulmonary nodules identified. Focally avid right hilar lymph node (max SUV 8.6). No abnormal uptake in the nonenlarged precarinal lymph node. Moderate sized hiatal hernia. No evidence of distant metastases\par \par s/p core biopsy of a right upper lobe lung mass (8/24/20)\par Pathology\par Small cell carcinoma. 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 He also reports that his house is from early 1900s and may have asbestos in the basement\par \par MRI brain (9/8/20)\par No evidence for intracranial metastatic disease.\par No acute infarction, acute intracranial hemorrhage, hydrocephalus or extra-axial fluid collection.\par Mild chronic microvascular ischemic changes.\par Mild opacification of the left mastoid air cells.\par \par  [de-identified] : He is seen today for C2 cisplatin etoposide (9/16/20 - present)\par He has started radiation (9/23/20 - present). Anticipated to completed 11/7/20)\par \par He co mild tiredness and fatigue, however yesterday he felt very tired. Not sure if he was anxious about getting chemo radiation together\par Remains active\par He also reports buzzing sound bl ears\par

## 2020-10-07 NOTE — DISEASE MANAGEMENT
[Clinical] : TNM Stage: c [IIB] : IIB [TTNM] : 1b [NTNM] : 1 [MTNM] : X [de-identified] : 800 cGy [de-identified] : 6600 cGy [de-identified] : right lung

## 2020-10-07 NOTE — CONSULT LETTER
[Dear  ___] : Dear  [unfilled], [Please see my note below.] : Please see my note below. [Courtesy Letter:] : I had the pleasure of seeing your patient, [unfilled], in my office today. [Sincerely,] : Sincerely, [Consult Closing:] : Thank you very much for allowing me to participate in the care of this patient.  If you have any questions, please do not hesitate to contact me. [DrSudhakar  ___] : Dr. GOLDMAN [FreeTextEntry3] : Javier Sanabria MD, MPH\par Attending Physician\par Hematology Oncology\par [facility] [place]\par [facility]\par

## 2020-10-13 VITALS
OXYGEN SATURATION: 98 % | RESPIRATION RATE: 18 BRPM | HEIGHT: 71 IN | DIASTOLIC BLOOD PRESSURE: 94 MMHG | BODY MASS INDEX: 34.02 KG/M2 | SYSTOLIC BLOOD PRESSURE: 141 MMHG | WEIGHT: 243 LBS | HEART RATE: 107 BPM

## 2020-10-13 NOTE — REVIEW OF SYSTEMS
[Esophagitis: Grade 0] : Esophagitis: Grade 0 [Nausea: Grade 0] : Nausea: Grade 0 [Fatigue: Grade 2 - Fatigue not relieved by rest; limiting instrumental ADL] : Fatigue: Grade 2 - Fatigue not relieved by rest; limiting instrumental ADL [Mucositis Oral: Grade 1 - Asymptomatic or mild symptoms; intervention not indicated] : Mucositis Oral: Grade 1 - Asymptomatic or mild symptoms; intervention not indicated [Dyspnea: Grade 0] : Dyspnea: Grade 0 [Hiccups: Grade 0] : Hiccups: Grade 0 [Hoarseness: Grade 0] : Hoarseness: Grade 0 [Dermatitis Radiation: Grade 0] : Dermatitis Radiation: Grade 0 [Cough: Grade 0] : Cough: Grade 0

## 2020-10-14 ENCOUNTER — RESULT REVIEW (OUTPATIENT)
Age: 65
End: 2020-10-14

## 2020-10-14 ENCOUNTER — APPOINTMENT (OUTPATIENT)
Dept: HEMATOLOGY ONCOLOGY | Facility: CLINIC | Age: 65
End: 2020-10-14
Payer: COMMERCIAL

## 2020-10-14 VITALS
RESPIRATION RATE: 18 BRPM | SYSTOLIC BLOOD PRESSURE: 124 MMHG | DIASTOLIC BLOOD PRESSURE: 78 MMHG | OXYGEN SATURATION: 96 % | BODY MASS INDEX: 34.16 KG/M2 | WEIGHT: 244 LBS | HEART RATE: 85 BPM | HEIGHT: 71 IN | TEMPERATURE: 96.6 F

## 2020-10-14 DIAGNOSIS — H93.13 TINNITUS, BILATERAL: ICD-10-CM

## 2020-10-14 PROCEDURE — 99214 OFFICE O/P EST MOD 30 MIN: CPT

## 2020-10-14 NOTE — CONSULT LETTER
[Dear  ___] : Dear  [unfilled], [Courtesy Letter:] : I had the pleasure of seeing your patient, [unfilled], in my office today. [Please see my note below.] : Please see my note below. [Sincerely,] : Sincerely, [Consult Closing:] : Thank you very much for allowing me to participate in the care of this patient.  If you have any questions, please do not hesitate to contact me. [DrSudhakar  ___] : Dr. GOLDMAN [FreeTextEntry3] : Javier Sanabria MD, MPH\par Attending Physician\par Hematology Oncology\par [facility] [place]\par [facility]\par

## 2020-10-14 NOTE — ASSESSMENT
[FreeTextEntry1] : 1. Small cell lung cancer - stage IIB\par Likely limited stage pending brain MRI\par At least Stage IIB\par PET/CT (6/20) No distant metastases\par 9/8/20 Brain MRI- No mets\par \par Treatment: cisplatin etoposide (9/16/20 - present ) - s/p C2 \par Zofran PRN for nausea\par Radiation with Dr. Hidalgo (9/23/20 - 11/7/20)\par Discussed about the side effects of concurrent chemoradiation including mouth sores, cytopenias etc\par Plan\par Cisplatin with etoposide Q3W x 4-6 treatment\par No neulasta as he  is getting radiation\par Seen by audiometry\par \par 2. Chemotherapy induced agranulocytosis\par No neulasta as he  is getting radiation\par Monitor symptoms, fever etc\par labs reviewed and discussed with patient\par \par 3. hx of Tinnitus - now resolved - seen by audiometry. Patient advised to have follow up with audiometry if it occur again.\par \par 3. Fatigue and poor PO intake - 2/2 chemotherapy - 1L IVH given yesterday - will consider IVH prn.\par \par Case and management discussed with Dr. Sanabria \par Follow up in 1 wk for labs. \par CBC, CMP

## 2020-10-14 NOTE — HISTORY OF PRESENT ILLNESS
[de-identified] : Mr Potter is a very pleasant 65 years ex smoker (quit 6 months ago, 1 ppd x 40 years), seen today for further management of his small cell lung cancer\par \par He reports "flu like symptoms" starting in March 2020, saw his PCP who obtained a CXR which showed a lung nodule, subsequently had  CT chest revealed 2 RUL nodules (measuring 1.3 cm and 1.1 cm). He was sent to [facility] for biopsy, He reports that his procedure was postponed for multiple weeks, and eventually he was told that he need to see a thoracic surgeon\par He saw Dr Sarthak Fernández, who referred him to IR for image guided biopsy\par \par CT Chest 5/28/2020: Moderate to extensive centrilobular paraseptal emphysema predominantly within bilateral upper lobes. There are 2 adjacent nodular densities noted within the RUL measuring 1.3 cm and 1.1 cm. Evaluation of the rest of the lung fields demonstrates mild subsegmental atelectasis and scaring involving the lingula. There is a 0.2 cm peripheral nodule note in the RLL. Also, there is a 0.2 cm nodule noted involving the superior segment of the RLL. There is also a central nodule noted involving the EMBER which measures 0.3 cm in sized. There are no spiculated nodules. There is no consolidation. \par \par PET/CT 6/23/2020: Mild focal uptake in the 2 adjacent pulmonary nodules in the posterior RUL (max SUV 4.7). No additional FDG avid pulmonary nodules identified. Focally avid right hilar lymph node (max SUV 8.6). No abnormal uptake in the nonenlarged precarinal lymph node. Moderate sized hiatal hernia. No evidence of distant metastases\par \par s/p core biopsy of a right upper lobe lung mass (8/24/20)\par Pathology\par Small cell carcinoma. 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 He also reports that his house is from early 1900s and may have asbestos in the basement\par \par MRI brain (9/8/20)\par No evidence for intracranial metastatic disease.\par No acute infarction, acute intracranial hemorrhage, hydrocephalus or extra-axial fluid collection.\par Mild chronic microvascular ischemic changes.\par Mild opacification of the left mastoid air cells.\par \par  [de-identified] : He is seen today for yareli follow up - s/p C2 cisplatin etoposide (9/16/20 - present)\par He has started radiation (9/23/20 - present). Anticipated to completed 11/7/20)\par \par Patient reports of being more fatigue after his C2 treatment, slept most of the days x 2 days with decreased PO intake. He felt better after receiving hydration yesterday. He reports of no hearing loss or ringing the ears, only happened once last week.  Overall, he's tolerates chemo and radiation well. Denies fever, bleeding,  dysphagia, SOB, or worsening of BERNSTEIN. \par \par Weight - 249 -> 243->244lbs\par

## 2020-10-15 NOTE — DISEASE MANAGEMENT
[Clinical] : TNM Stage: c [IIB] : IIB [TTNM] : 1b [MTNM] : X [NTNM] : 1 [de-identified] : 2800 cGy [de-identified] : 6600 cGy [de-identified] : right lung

## 2020-10-15 NOTE — PHYSICAL EXAM
[Normal] : oriented to person, place and time, the affect was normal, the mood was normal and not anxious [Heart Rate And Rhythm] : heart rate and rhythm were normal [Heart Sounds] : normal S1 and S2 [de-identified] : tachycardic

## 2020-10-15 NOTE — HISTORY OF PRESENT ILLNESS
[FreeTextEntry1] : Mr. Potter reports he is feeling fatigued since C2 of chemotherapy last week. He is status post fraction 14 / 33 of radiation to the right lung. He reports appetite is decreased and his weight is 243 lbs (248 lbs prior). He has increasing phlegm and is taking OTC NyQuil. He is tachycardic at 107 bpm and will be receiving hydration today.

## 2020-10-19 NOTE — REVIEW OF SYSTEMS
[Esophagitis: Grade 0] : Esophagitis: Grade 0 [Fatigue: Grade 2 - Fatigue not relieved by rest; limiting instrumental ADL] : Fatigue: Grade 2 - Fatigue not relieved by rest; limiting instrumental ADL [Nausea: Grade 0] : Nausea: Grade 0 [Mucositis Oral: Grade 1 - Asymptomatic or mild symptoms; intervention not indicated] : Mucositis Oral: Grade 1 - Asymptomatic or mild symptoms; intervention not indicated [Dyspnea: Grade 0] : Dyspnea: Grade 0 [Cough: Grade 0] : Cough: Grade 0 [Hoarseness: Grade 0] : Hoarseness: Grade 0 [Hiccups: Grade 0] : Hiccups: Grade 0 [Dermatitis Radiation: Grade 0] : Dermatitis Radiation: Grade 0

## 2020-10-20 ENCOUNTER — NON-APPOINTMENT (OUTPATIENT)
Age: 65
End: 2020-10-20

## 2020-10-20 VITALS
TEMPERATURE: 97.6 F | RESPIRATION RATE: 18 BRPM | SYSTOLIC BLOOD PRESSURE: 127 MMHG | WEIGHT: 243.25 LBS | OXYGEN SATURATION: 100 % | HEART RATE: 87 BPM | DIASTOLIC BLOOD PRESSURE: 83 MMHG | BODY MASS INDEX: 33.93 KG/M2

## 2020-10-20 DIAGNOSIS — R12 HEARTBURN: ICD-10-CM

## 2020-10-20 DIAGNOSIS — K21.9 GASTRO-ESOPHAGEAL REFLUX DISEASE W/OUT ESOPHAGITIS: ICD-10-CM

## 2020-10-21 ENCOUNTER — APPOINTMENT (OUTPATIENT)
Dept: HEMATOLOGY ONCOLOGY | Facility: CLINIC | Age: 65
End: 2020-10-21
Payer: COMMERCIAL

## 2020-10-21 ENCOUNTER — RESULT REVIEW (OUTPATIENT)
Age: 65
End: 2020-10-21

## 2020-10-21 VITALS
RESPIRATION RATE: 18 BRPM | HEART RATE: 96 BPM | BODY MASS INDEX: 33.88 KG/M2 | DIASTOLIC BLOOD PRESSURE: 75 MMHG | OXYGEN SATURATION: 97 % | TEMPERATURE: 96.6 F | SYSTOLIC BLOOD PRESSURE: 111 MMHG | WEIGHT: 242 LBS | HEIGHT: 71 IN

## 2020-10-21 PROCEDURE — 99214 OFFICE O/P EST MOD 30 MIN: CPT

## 2020-10-21 NOTE — DISEASE MANAGEMENT
[Clinical] : TNM Stage: c [IIB] : IIB [TTNM] : 1b [NTNM] : 1 [MTNM] : X [de-identified] : 3800 cGy [de-identified] : 6600 cGy [de-identified] : right lung

## 2020-10-21 NOTE — HISTORY OF PRESENT ILLNESS
[FreeTextEntry1] : Mr. Potter is present for OTV, status post fraction 19 / 33 of radiation. He reports that he is feeling much better this week. He had one day of IV hydration last week. He reports mild - moderate fatigue present. He offers no new complaints. He feels he has recovered from his second cycle of chemotherapy.

## 2020-10-21 NOTE — CONSULT LETTER
[Dear  ___] : Dear  [unfilled], [Courtesy Letter:] : I had the pleasure of seeing your patient, [unfilled], in my office today. [Please see my note below.] : Please see my note below. [Consult Closing:] : Thank you very much for allowing me to participate in the care of this patient.  If you have any questions, please do not hesitate to contact me. [Sincerely,] : Sincerely, [DrSudhakar  ___] : Dr. GOLDMAN [FreeTextEntry3] : Javier Sanabria MD, MPH\par Attending Physician\par Hematology Oncology\par [facility] [place]\par [facility]\par

## 2020-10-21 NOTE — ASSESSMENT
[FreeTextEntry1] : 1. Small cell lung cancer - stage IIB\par Likely limited stage pending brain MRI\par At least Stage IIB\par PET/CT (6/20) No distant metastases\par 9/8/20 Brain MRI- No mets\par \par Treatment: cisplatin etoposide (9/16/20 - present ) - s/p C2 \par Zofran PRN for nausea\par Radiation with Dr. Hidalgo (9/23/20 - 11/7/20) - Radiation held tomorrow and Friday 10/23/20 for neutropenia.\par Plan\par Cisplatin with etoposide Q3W x 4-6 treatment\par Seen by audiometry\par \par 2. Chemotherapy induced agranulocytosis - afebrile - Pt to hold off radiation for the next two days \par to get Granix 480mcg tomorrow and Friday 10/23/20 and Saturday 10/24/20. \par to take Cipro 500mg as prophylactic.\par Monitor symptoms, fever etc\par labs reviewed and discussed with patient\par \par 3. hx of Tinnitus - now resolved - seen by audiometry. Patient advised to have follow up with audiometry if it occur again.\par \par 3. Fatigue and poor PO intake - 2/2 chemotherapy - will consider IVH after each chemo treatment. \par \par Case and management discussed with Dr. Sanabria \par Follow up in 1 wk for  C3\par CBC, CMP

## 2020-10-21 NOTE — HISTORY OF PRESENT ILLNESS
[de-identified] : Mr Potter is a very pleasant 65 years ex smoker (quit 6 months ago, 1 ppd x 40 years), seen today for further management of his small cell lung cancer\par \par He reports "flu like symptoms" starting in March 2020, saw his PCP who obtained a CXR which showed a lung nodule, subsequently had  CT chest revealed 2 RUL nodules (measuring 1.3 cm and 1.1 cm). He was sent to [facility] for biopsy, He reports that his procedure was postponed for multiple weeks, and eventually he was told that he need to see a thoracic surgeon\par He saw Dr Sarthak Fernández, who referred him to IR for image guided biopsy\par \par CT Chest 5/28/2020: Moderate to extensive centrilobular paraseptal emphysema predominantly within bilateral upper lobes. There are 2 adjacent nodular densities noted within the RUL measuring 1.3 cm and 1.1 cm. Evaluation of the rest of the lung fields demonstrates mild subsegmental atelectasis and scaring involving the lingula. There is a 0.2 cm peripheral nodule note in the RLL. Also, there is a 0.2 cm nodule noted involving the superior segment of the RLL. There is also a central nodule noted involving the EMBER which measures 0.3 cm in sized. There are no spiculated nodules. There is no consolidation. \par \par PET/CT 6/23/2020: Mild focal uptake in the 2 adjacent pulmonary nodules in the posterior RUL (max SUV 4.7). No additional FDG avid pulmonary nodules identified. Focally avid right hilar lymph node (max SUV 8.6). No abnormal uptake in the nonenlarged precarinal lymph node. Moderate sized hiatal hernia. No evidence of distant metastases\par \par s/p core biopsy of a right upper lobe lung mass (8/24/20)\par Pathology\par Small cell carcinoma. 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 He also reports that his house is from early 1900s and may have asbestos in the basement\par \par MRI brain (9/8/20)\par No evidence for intracranial metastatic disease.\par No acute infarction, acute intracranial hemorrhage, hydrocephalus or extra-axial fluid collection.\par Mild chronic microvascular ischemic changes.\par Mild opacification of the left mastoid air cells.\par \par  [de-identified] : He is seen today for yareli follow up - s/p C2 cisplatin etoposide (9/16/20 - present)\par He has started radiation (9/23/20 - present). Anticipated to completed 11/7/20)\par \par Patient reports of having some minimal intermittent throat pain that has yet affects his appetite, has yet able to fill Magic Mouthwash as recommended by Dr. Hidalgo due to insurance approval. His post chemo fatigue has resolved, took him about 8 days to fully recover. Denies n/v, fever, headaches, dizziness, chest pain/palpitation, SOB/BERNSTEIN, b/l lower ext edema. \par \par Weight - 249 -> 243->244lbs ->242lbs.\par

## 2020-10-23 ENCOUNTER — APPOINTMENT (OUTPATIENT)
Dept: HEMATOLOGY ONCOLOGY | Facility: CLINIC | Age: 65
End: 2020-10-23
Payer: COMMERCIAL

## 2020-10-23 ENCOUNTER — RESULT REVIEW (OUTPATIENT)
Age: 65
End: 2020-10-23

## 2020-10-23 VITALS
SYSTOLIC BLOOD PRESSURE: 105 MMHG | BODY MASS INDEX: 33.75 KG/M2 | HEIGHT: 71 IN | TEMPERATURE: 97.5 F | WEIGHT: 241.1 LBS | RESPIRATION RATE: 18 BRPM | HEART RATE: 99 BPM | DIASTOLIC BLOOD PRESSURE: 67 MMHG | OXYGEN SATURATION: 95 %

## 2020-10-23 PROCEDURE — 99072 ADDL SUPL MATRL&STAF TM PHE: CPT

## 2020-10-23 PROCEDURE — 99499A: CUSTOM | Mod: NC

## 2020-10-26 NOTE — REVIEW OF SYSTEMS
[Esophagitis: Grade 0] : Esophagitis: Grade 0 [Nausea: Grade 0] : Nausea: Grade 0 [Fatigue: Grade 2 - Fatigue not relieved by rest; limiting instrumental ADL] : Fatigue: Grade 2 - Fatigue not relieved by rest; limiting instrumental ADL [Mucositis Oral: Grade 1 - Asymptomatic or mild symptoms; intervention not indicated] : Mucositis Oral: Grade 1 - Asymptomatic or mild symptoms; intervention not indicated [Cough: Grade 0] : Cough: Grade 0 [Dyspnea: Grade 0] : Dyspnea: Grade 0 [Hiccups: Grade 0] : Hiccups: Grade 0 [Hoarseness: Grade 0] : Hoarseness: Grade 0 [Dermatitis Radiation: Grade 0] : Dermatitis Radiation: Grade 0

## 2020-10-27 ENCOUNTER — NON-APPOINTMENT (OUTPATIENT)
Age: 65
End: 2020-10-27

## 2020-10-27 VITALS
WEIGHT: 246 LBS | DIASTOLIC BLOOD PRESSURE: 72 MMHG | HEIGHT: 71 IN | BODY MASS INDEX: 34.44 KG/M2 | RESPIRATION RATE: 18 BRPM | SYSTOLIC BLOOD PRESSURE: 128 MMHG | OXYGEN SATURATION: 95 % | HEART RATE: 96 BPM

## 2020-10-28 ENCOUNTER — APPOINTMENT (OUTPATIENT)
Dept: HEMATOLOGY ONCOLOGY | Facility: CLINIC | Age: 65
End: 2020-10-28
Payer: COMMERCIAL

## 2020-10-28 ENCOUNTER — RESULT REVIEW (OUTPATIENT)
Age: 65
End: 2020-10-28

## 2020-10-28 VITALS
SYSTOLIC BLOOD PRESSURE: 123 MMHG | WEIGHT: 243 LBS | TEMPERATURE: 97.1 F | BODY MASS INDEX: 34.02 KG/M2 | HEART RATE: 96 BPM | OXYGEN SATURATION: 95 % | HEIGHT: 71 IN | RESPIRATION RATE: 18 BRPM | DIASTOLIC BLOOD PRESSURE: 71 MMHG

## 2020-10-28 DIAGNOSIS — M54.5 LOW BACK PAIN: ICD-10-CM

## 2020-10-28 PROCEDURE — 99215 OFFICE O/P EST HI 40 MIN: CPT

## 2020-10-28 PROCEDURE — 99072 ADDL SUPL MATRL&STAF TM PHE: CPT

## 2020-10-28 NOTE — HISTORY OF PRESENT ILLNESS
[de-identified] : Mr Potter is a very pleasant 65 years ex smoker (quit 6 months ago, 1 ppd x 40 years), seen today for further management of his small cell lung cancer\par \par He reports "flu like symptoms" starting in March 2020, saw his PCP who obtained a CXR which showed a lung nodule, subsequently had  CT chest revealed 2 RUL nodules (measuring 1.3 cm and 1.1 cm). He was sent to [facility] for biopsy, He reports that his procedure was postponed for multiple weeks, and eventually he was told that he need to see a thoracic surgeon\par He saw Dr Sarthak Fernández, who referred him to IR for image guided biopsy\par \par CT Chest 5/28/2020: Moderate to extensive centrilobular paraseptal emphysema predominantly within bilateral upper lobes. There are 2 adjacent nodular densities noted within the RUL measuring 1.3 cm and 1.1 cm. Evaluation of the rest of the lung fields demonstrates mild subsegmental atelectasis and scaring involving the lingula. There is a 0.2 cm peripheral nodule note in the RLL. Also, there is a 0.2 cm nodule noted involving the superior segment of the RLL. There is also a central nodule noted involving the EMBER which measures 0.3 cm in sized. There are no spiculated nodules. There is no consolidation. \par \par PET/CT 6/23/2020: Mild focal uptake in the 2 adjacent pulmonary nodules in the posterior RUL (max SUV 4.7). No additional FDG avid pulmonary nodules identified. Focally avid right hilar lymph node (max SUV 8.6). No abnormal uptake in the nonenlarged precarinal lymph node. Moderate sized hiatal hernia. No evidence of distant metastases\par \par s/p core biopsy of a right upper lobe lung mass (8/24/20)\par Pathology\par Small cell carcinoma. 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 He also reports that his house is from early 1900s and may have asbestos in the basement\par \par MRI brain (9/8/20)\par No evidence for intracranial metastatic disease.\par No acute infarction, acute intracranial hemorrhage, hydrocephalus or extra-axial fluid collection.\par Mild chronic microvascular ischemic changes.\par Mild opacification of the left mastoid air cells.\par \par  [de-identified] : He is seen today for C3 cisplatin etoposide (9/16/20 - present)\par He has started radiation (9/23/20 - present). Anticipated to completed 11/9/20\par \par He had back pain with GCSF last week\par He feels tired.\par He tries to walk his dog. Last week her couldn’t do that\par Appetite is poor but he is pushing himself to eat. Has gained 1 lbs since last visit\par \par Weight - 249 -> 243 -> 244lbs ->242lbs -> 243 lbs\par

## 2020-10-28 NOTE — CONSULT LETTER
[Dear  ___] : Dear  [unfilled], [Courtesy Letter:] : I had the pleasure of seeing your patient, [unfilled], in my office today. [Please see my note below.] : Please see my note below. [Consult Closing:] : Thank you very much for allowing me to participate in the care of this patient.  If you have any questions, please do not hesitate to contact me. [Sincerely,] : Sincerely, [DrSudhakar  ___] : Dr. GOLDMAN [FreeTextEntry3] : Javier Sanabria MD, MPH\par Attending Physician\par Hematology Oncology\par [facility] [place]\par [facility]\par

## 2020-10-28 NOTE — ASSESSMENT
[FreeTextEntry1] : Small cell lung cancer - stage IIB\par Likely limited stage pending brain MRI\par At least Stage IIB\par PET/CT (6/20) No distant metastases\par 9/8/20 Brain MRI- No mets\par \par He is seen today for follow up and C3 chemo\par Treatment: cisplatin etoposide (9/16/20 - present ) \par He developed chemoradiation induced agranulocytosis s/p holding radiation and GCSF x 2\par Low back pain - likely related to GCSF. Resolved in a few days. Advised to take tylenol + claritin for similar symptoms next time he gets GCSF \par Given the cytopenias, will dose reduce etoposide 20% for C3. Will resume regular dosing with next cycle since it will be post completion of radiation\par Zofran PRN for nausea\par Radiation with Dr. Hidalgo (9/23/20 - 11/7/20) \par Plan\par Cisplatin with etoposide Q3W x 4-6 treatment\par Seen by audiometry\par \par History of Tinnitus - now resolved - seen by audiometry. Patient advised to have follow up with audiometry if it occur again.\par \par  Fatigue and poor PO intake - 2/2 chemotherapy - will consider IVH after each chemo treatment. \par \par Follow up in 1 wk for  yareli labs and 3 weeks for C4\par CBC, CMP, magnesium, phosphorus

## 2020-10-29 NOTE — HISTORY OF PRESENT ILLNESS
[FreeTextEntry1] : Mr. Potter is present today for treatment and OTV. He reports feeling much better today. He had chemotherapy induced agranulocytosis and received Granix.  As a result, radiation treatment was on hold for two days and he resumed on 10/26/20. He offers no new symptoms at this time. He reports cycle 3 of Cisplatin etoposide scheduled for tomorrow.  Appetite remains fair and he continues to hydrate orally. He continues saltwater and baking soda rinses several times a day. \par \par \par \par Right Lung - SCC, stage IIB,  c T1b N1 MX\par Fraction 22/33, 4400 cGy, completion 11/7/20\par Concurrent chemo w/ Cisplatin

## 2020-10-29 NOTE — DISEASE MANAGEMENT
[Clinical] : TNM Stage: c [IIB] : IIB [TTNM] : 1b [NTNM] : 1 [MTNM] : X [de-identified] : 4400 cGy [de-identified] : 6600 cGy [de-identified] : right lung

## 2020-11-02 ENCOUNTER — APPOINTMENT (OUTPATIENT)
Dept: HEMATOLOGY ONCOLOGY | Facility: CLINIC | Age: 65
End: 2020-11-02
Payer: COMMERCIAL

## 2020-11-02 ENCOUNTER — RESULT REVIEW (OUTPATIENT)
Age: 65
End: 2020-11-02

## 2020-11-02 VITALS
RESPIRATION RATE: 18 BRPM | BODY MASS INDEX: 33.18 KG/M2 | SYSTOLIC BLOOD PRESSURE: 134 MMHG | HEIGHT: 70.98 IN | TEMPERATURE: 98.6 F | OXYGEN SATURATION: 96 % | DIASTOLIC BLOOD PRESSURE: 85 MMHG | HEART RATE: 112 BPM | WEIGHT: 237 LBS

## 2020-11-02 DIAGNOSIS — G47.10 HYPERSOMNIA, UNSPECIFIED: ICD-10-CM

## 2020-11-02 PROCEDURE — 99072 ADDL SUPL MATRL&STAF TM PHE: CPT

## 2020-11-02 PROCEDURE — 99214 OFFICE O/P EST MOD 30 MIN: CPT

## 2020-11-02 NOTE — HISTORY OF PRESENT ILLNESS
[de-identified] : Mr Potter is a very pleasant 65 years ex smoker (quit 6 months ago, 1 ppd x 40 years), seen today for further management of his small cell lung cancer\par \par He reports "flu like symptoms" starting in March 2020, saw his PCP who obtained a CXR which showed a lung nodule, subsequently had  CT chest revealed 2 RUL nodules (measuring 1.3 cm and 1.1 cm). He was sent to [facility] for biopsy, He reports that his procedure was postponed for multiple weeks, and eventually he was told that he need to see a thoracic surgeon\par He saw Dr Sarthak Fernández, who referred him to IR for image guided biopsy\par \par CT Chest 5/28/2020: Moderate to extensive centrilobular paraseptal emphysema predominantly within bilateral upper lobes. There are 2 adjacent nodular densities noted within the RUL measuring 1.3 cm and 1.1 cm. Evaluation of the rest of the lung fields demonstrates mild subsegmental atelectasis and scaring involving the lingula. There is a 0.2 cm peripheral nodule note in the RLL. Also, there is a 0.2 cm nodule noted involving the superior segment of the RLL. There is also a central nodule noted involving the EMBER which measures 0.3 cm in sized. There are no spiculated nodules. There is no consolidation. \par \par PET/CT 6/23/2020: Mild focal uptake in the 2 adjacent pulmonary nodules in the posterior RUL (max SUV 4.7). No additional FDG avid pulmonary nodules identified. Focally avid right hilar lymph node (max SUV 8.6). No abnormal uptake in the nonenlarged precarinal lymph node. Moderate sized hiatal hernia. No evidence of distant metastases\par \par s/p core biopsy of a right upper lobe lung mass (8/24/20)\par Pathology\par Small cell carcinoma. 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 He also reports that his house is from early 1900s and may have asbestos in the basement\par \par MRI brain (9/8/20)\par No evidence for intracranial metastatic disease.\par No acute infarction, acute intracranial hemorrhage, hydrocephalus or extra-axial fluid collection.\par Mild chronic microvascular ischemic changes.\par Mild opacification of the left mastoid air cells.\par \par  [de-identified] : He is seen today for a sick visit\par On cisplatin etoposide (9/16/20 - present)\par s/p  C3 on 10/28\par He has started radiation (9/23/20 - present). Anticipated to completed 11/9/20\par \par He was okay the next day of chemo, but the day after- he was so tired that he slept all day. He probably slept about 18 hours. He woke up today AM and noticed dizziness, lightheadedness, shaky, numbness in the hands.\par His last meal was yesterday at 2 PM, when had a bowl of chicken soup\par \par Weight - 249 -> 243 -> 244lbs ->242lbs -> 243 lbs -> 237 lbs\par

## 2020-11-02 NOTE — CONSULT LETTER
[Dear  ___] : Dear  [unfilled], [Courtesy Letter:] : I had the pleasure of seeing your patient, [unfilled], in my office today. [Please see my note below.] : Please see my note below. [Consult Closing:] : Thank you very much for allowing me to participate in the care of this patient.  If you have any questions, please do not hesitate to contact me. [Sincerely,] : Sincerely, [DrSudhakar  ___] : Dr. GOLDMAN [FreeTextEntry3] : Javier Sanabria MD, MPH\par Attending Physician\par Hematology Oncology\par [facility] [place]\par [facility]\par

## 2020-11-02 NOTE — ASSESSMENT
[FreeTextEntry1] : Sick visit ( dizziness, lightheadedness, shaky, numbness in the hands.)\par Dehydration.\par LOWELL\par Hypomagnesemia\par He left before his CMP and mag were back. Was coming tomorrow for radiation\par Advised to take gatorade and hydrate him self\par IV NS with Magnesium tomorrow\par If symptoms do not resolve, will refer to audiology/ENT\par \par Small cell lung cancer - stage IIB\par Likely limited stage pending brain MRI\par At least Stage IIB\par PET/CT (6/20) No distant metastases\par 9/8/20 Brain MRI- No mets\par \par Treatment: cisplatin etoposide (9/16/20 - present ) \par He developed chemoradiation induced agranulocytosis s/p holding radiation and GCSF x 2\par Low back pain - likely related to GCSF. Resolved in a few days. Advised to take tylenol + claritin for similar symptoms next time he gets GCSF \par Given the cytopenias, will dose reduce etoposide 20% for C3. Will resume regular dosing with next cycle since it will be post completion of radiation\par Zofran PRN for nausea\par Radiation with Dr. Hidalgo (9/23/20 - 11/7/20) \par Plan\par Cisplatin with etoposide Q3W x 4-6 treatment\par Seen by audiometry\par \par History of Tinnitus - now resolved - seen by audiometry. Patient advised to have follow up with audiometry if it occur again.\par \par Fatigue and poor PO intake - 2/2 chemotherapy - will consider IVH after each chemo treatment. \par \par Follow up in 1 wk for  yareli labs and 3 weeks for C4\par CBC, CMP, magnesium, phosphorus

## 2020-11-03 ENCOUNTER — NON-APPOINTMENT (OUTPATIENT)
Age: 65
End: 2020-11-03

## 2020-11-03 VITALS
SYSTOLIC BLOOD PRESSURE: 129 MMHG | HEART RATE: 104 BPM | OXYGEN SATURATION: 98 % | DIASTOLIC BLOOD PRESSURE: 84 MMHG | RESPIRATION RATE: 18 BRPM

## 2020-11-04 ENCOUNTER — APPOINTMENT (OUTPATIENT)
Dept: HEMATOLOGY ONCOLOGY | Facility: CLINIC | Age: 65
End: 2020-11-04

## 2020-11-04 NOTE — PHYSICAL EXAM
[Normal] : normal heart rate and rhythm, normal S1 and S2, and no murmurs present [Heart Sounds] : normal S1 and S2 [de-identified] : jeff

## 2020-11-04 NOTE — HISTORY OF PRESENT ILLNESS
[FreeTextEntry1] : Mr. Potter reports he has not been feeling well since chemotherapy on Friday. He is s/p fraction 27 / 33 of radiation today. He reports decreased appetite and no enough fluid intake.  - 104 bpm. He is scheduled for IV hydration today. \par \par \par Right Lung - SCC, stage IIB,  c T1b N1 MX\par Fraction 27/33, 5400 cGy, completion 11/11/20\par Concurrent chemo w/ Cisplatin (cycle 3)

## 2020-11-04 NOTE — DISEASE MANAGEMENT
[Clinical] : TNM Stage: c [IIB] : IIB [TTNM] : 1b [NTNM] : 1 [MTNM] : X [de-identified] : 5400 cGy [de-identified] : 6600 cGy [de-identified] : right lung

## 2020-11-06 ENCOUNTER — RESULT REVIEW (OUTPATIENT)
Age: 65
End: 2020-11-06

## 2020-11-06 ENCOUNTER — APPOINTMENT (OUTPATIENT)
Dept: HEMATOLOGY ONCOLOGY | Facility: CLINIC | Age: 65
End: 2020-11-06
Payer: COMMERCIAL

## 2020-11-06 VITALS
OXYGEN SATURATION: 99 % | DIASTOLIC BLOOD PRESSURE: 61 MMHG | HEIGHT: 70.98 IN | SYSTOLIC BLOOD PRESSURE: 100 MMHG | BODY MASS INDEX: 33.32 KG/M2 | RESPIRATION RATE: 18 BRPM | TEMPERATURE: 97.5 F | HEART RATE: 108 BPM | WEIGHT: 238 LBS

## 2020-11-06 PROCEDURE — 99214 OFFICE O/P EST MOD 30 MIN: CPT

## 2020-11-06 PROCEDURE — 99072 ADDL SUPL MATRL&STAF TM PHE: CPT

## 2020-11-06 RX ORDER — OMEGA-3/DHA/EPA/FISH OIL 300-1000MG
400 CAPSULE ORAL
Qty: 60 | Refills: 0 | Status: DISCONTINUED | COMMUNITY
Start: 2020-11-03 | End: 2020-11-06

## 2020-11-06 NOTE — ASSESSMENT
[FreeTextEntry1] : #Small cell lung cancer - at least stage IIB\par PET/CT (6/20) No distant metastases\par 9/8/20 Brain MRI- No mets\par \par Treatment: cisplatin etoposide (9/16/20 - present ) - s/p C3 (reduced etoposdie due to cytopenia) -  Will resume regular dosing with next cycle since it will be post completion of radiation\par -Patient with hx of  agranulocytosis s/p holding radiation and GCSF x 2 now with ANC 1.08 - no GCSF today since patient received radiation today - to give GCSF 11/9/20 and 11/10/20\par Zofran PRN for nausea\par Radiation with Dr. Hidalgo (9/23/20 - 11/9/20) \par \par Plan\par Cisplatin with etoposide Q3W x 4-6 treatment\par Seen by audiometry\par \par #peripheral neuropathy - 2/2 treatment - will closely monitor. ADLs not affected\par #History of Tinnitus - now resolved - seen by audiometry. Patient advised to have follow up with audiometry if it occur again.\par \par #Fatigue and poor PO intake - 2/2 chemotherapy - will consider IVH after each chemo treatment. \par #hx of hypoMg - 2 grams given 9/4/20 - 2 more grams given today - Now Mg of 1.9 - advised to stop PO Mg supplement.\par #renal insufficiency - IVH given today - increase fluid intake advised. \par #tremors on hands and weakness - closely monitor.  will consider neurology consult if trembling persisted and worsen.\par \par Case and management discussed with Dr. Sanabria \par Follow up in 1 wk for  yareli labs and2 weeks for C4\par CBC, CMP, magnesium, phosphorus

## 2020-11-06 NOTE — CONSULT LETTER
[Dear  ___] : Dear  [unfilled], [Courtesy Letter:] : I had the pleasure of seeing your patient, [unfilled], in my office today. [Please see my note below.] : Please see my note below. [Consult Closing:] : Thank you very much for allowing me to participate in the care of this patient.  If you have any questions, please do not hesitate to contact me. [Sincerely,] : Sincerely, [DrSudhakar  ___] : Dr. GOLDMAN [FreeTextEntry3] : Javier Sanabria MD, MPH\par Attending Physician\par Hematology Oncology\par [facility] [place]\par [facility]\par

## 2020-11-06 NOTE — HISTORY OF PRESENT ILLNESS
[de-identified] : Mr Potter is a very pleasant 65 years ex smoker (quit 6 months ago, 1 ppd x 40 years), seen today for further management of his small cell lung cancer\par \par He reports "flu like symptoms" starting in March 2020, saw his PCP who obtained a CXR which showed a lung nodule, subsequently had  CT chest revealed 2 RUL nodules (measuring 1.3 cm and 1.1 cm). He was sent to [facility] for biopsy, He reports that his procedure was postponed for multiple weeks, and eventually he was told that he need to see a thoracic surgeon\par He saw Dr Sarthak Fernández, who referred him to IR for image guided biopsy\par \par CT Chest 5/28/2020: Moderate to extensive centrilobular paraseptal emphysema predominantly within bilateral upper lobes. There are 2 adjacent nodular densities noted within the RUL measuring 1.3 cm and 1.1 cm. Evaluation of the rest of the lung fields demonstrates mild subsegmental atelectasis and scaring involving the lingula. There is a 0.2 cm peripheral nodule note in the RLL. Also, there is a 0.2 cm nodule noted involving the superior segment of the RLL. There is also a central nodule noted involving the EMBER which measures 0.3 cm in sized. There are no spiculated nodules. There is no consolidation. \par \par PET/CT 6/23/2020: Mild focal uptake in the 2 adjacent pulmonary nodules in the posterior RUL (max SUV 4.7). No additional FDG avid pulmonary nodules identified. Focally avid right hilar lymph node (max SUV 8.6). No abnormal uptake in the nonenlarged precarinal lymph node. Moderate sized hiatal hernia. No evidence of distant metastases\par \par s/p core biopsy of a right upper lobe lung mass (8/24/20)\par Pathology\par Small cell carcinoma. 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 He also reports that his house is from early 1900s and may have asbestos in the basement\par \par MRI brain (9/8/20)\par No evidence for intracranial metastatic disease.\par No acute infarction, acute intracranial hemorrhage, hydrocephalus or extra-axial fluid collection.\par Mild chronic microvascular ischemic changes.\par Mild opacification of the left mastoid air cells.\par \par  [de-identified] : He is seen today for a sick visit\par On cisplatin etoposide (9/16/20 - present)\par s/p  C3 on 10/28\par He has started radiation (9/23/20 - present). Anticipated to completed 11/9/20\par \par Patient is seen in the infusion suit, reports of doing better, less tired and able to walk his dog and eating better. Patient has numbness on hands and feet along with some trembling on both hands. Denies n/v, fever, headaches,dizziness, chest pain, SOB/BERNSTEIN, bleeding\par \par Weight - 249 -> 243 -> 244lbs ->242lbs -> 243 lbs -> 237 lbs\par

## 2020-11-10 ENCOUNTER — RESULT REVIEW (OUTPATIENT)
Age: 65
End: 2020-11-10

## 2020-11-10 ENCOUNTER — NON-APPOINTMENT (OUTPATIENT)
Age: 65
End: 2020-11-10

## 2020-11-10 ENCOUNTER — APPOINTMENT (OUTPATIENT)
Dept: HEMATOLOGY ONCOLOGY | Facility: CLINIC | Age: 65
End: 2020-11-10
Payer: COMMERCIAL

## 2020-11-10 VITALS
RESPIRATION RATE: 18 BRPM | SYSTOLIC BLOOD PRESSURE: 123 MMHG | DIASTOLIC BLOOD PRESSURE: 69 MMHG | HEART RATE: 82 BPM | BODY MASS INDEX: 33.32 KG/M2 | TEMPERATURE: 97.4 F | HEIGHT: 70.98 IN | WEIGHT: 238 LBS | OXYGEN SATURATION: 97 %

## 2020-11-10 DIAGNOSIS — E83.42 HYPOMAGNESEMIA: ICD-10-CM

## 2020-11-10 PROCEDURE — 99072 ADDL SUPL MATRL&STAF TM PHE: CPT

## 2020-11-10 PROCEDURE — 99214 OFFICE O/P EST MOD 30 MIN: CPT

## 2020-11-10 NOTE — ASSESSMENT
[FreeTextEntry1] : #Small cell lung cancer - at least stage IIB\par PET/CT (6/20) No distant metastases\par 9/8/20 Brain MRI- No mets\par \par Treatment: cisplatin etoposide (9/16/20 - present ) - s/p C3 (reduced etoposide due to cytopenia) - last treatment 10/28/20\par Labs reviewed and discussed with patient.\par -Hold Radiation yesterday and the rest of the week due to pancytopenia - patient has two more sessions to go.\par #Pancytopenia - ANC -  GCSF 11/9/20, 11/10/20, and daily for the next three days - afebrile. Signs and symptoms explained to patient.\par               - Anemia - Hgb 7.6 - asymptomatic -no transfusion needed\par               - thrombocytopenia - plts 38 - no bleeding. signs and symptoms explained to patient and he understands. Advised to hold Aspirin and not to take any other NSAIDs.  \par \par #renal insufficiency - Cr elevated in the last week with current BUN/CR - 25/2.2 - Hydration has been given twice a week since last week - 1 Liter given today. Renal US ordered. \par        - 2/2 to chemotherapy? Patient has no diarrhea, no abnormal urinary symptoms. \par \par #Zofran PRN for nausea\par Radiation with Dr. Hidalgo (9/23/20 - present) - has two more sessions left, on hold for now due to pancytopenia\par \par Plan\par Cisplatin with etoposide Q3W x 4-6 treatment\par Seen by audiometry\par \par #peripheral neuropathy - 2/2 treatment - will closely monitor. ADLs not affected - improved\par #History of Tinnitus - now resolved - seen by audiometry. Patient advised to have follow up with audiometry if it occur again.\par \par #Fatigue and poor PO intake - 2/2 chemotherapy - will consider IVH after each chemo treatment. \par #hx of hypoMg - 2 grams given 9/4/20 - 2 more grams given today - Now Mg of 1.9 - advised to stop PO Mg supplement.\par #renal insufficiency - increased hydration intake advised.\par #tremors on hands and weakness -improved\par \par Case and management discussed with Dr. Sanabria \par Follow up in 2 days for labs and 1 week for C4\par CBC, CMP, magnesium, phosphorus

## 2020-11-10 NOTE — HISTORY OF PRESENT ILLNESS
[de-identified] : Mr Potter is a very pleasant 65 years ex smoker (quit 6 months ago, 1 ppd x 40 years), seen today for further management of his small cell lung cancer\par \par He reports "flu like symptoms" starting in March 2020, saw his PCP who obtained a CXR which showed a lung nodule, subsequently had  CT chest revealed 2 RUL nodules (measuring 1.3 cm and 1.1 cm). He was sent to [facility] for biopsy, He reports that his procedure was postponed for multiple weeks, and eventually he was told that he need to see a thoracic surgeon\par He saw Dr Sarthak Fernández, who referred him to IR for image guided biopsy\par \par CT Chest 5/28/2020: Moderate to extensive centrilobular paraseptal emphysema predominantly within bilateral upper lobes. There are 2 adjacent nodular densities noted within the RUL measuring 1.3 cm and 1.1 cm. Evaluation of the rest of the lung fields demonstrates mild subsegmental atelectasis and scaring involving the lingula. There is a 0.2 cm peripheral nodule note in the RLL. Also, there is a 0.2 cm nodule noted involving the superior segment of the RLL. There is also a central nodule noted involving the EMBER which measures 0.3 cm in sized. There are no spiculated nodules. There is no consolidation. \par \par PET/CT 6/23/2020: Mild focal uptake in the 2 adjacent pulmonary nodules in the posterior RUL (max SUV 4.7). No additional FDG avid pulmonary nodules identified. Focally avid right hilar lymph node (max SUV 8.6). No abnormal uptake in the nonenlarged precarinal lymph node. Moderate sized hiatal hernia. No evidence of distant metastases\par \par s/p core biopsy of a right upper lobe lung mass (8/24/20)\par Pathology\par Small cell carcinoma. 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 He also reports that his house is from early 1900s and may have asbestos in the basement\par \par MRI brain (9/8/20)\par No evidence for intracranial metastatic disease.\par No acute infarction, acute intracranial hemorrhage, hydrocephalus or extra-axial fluid collection.\par Mild chronic microvascular ischemic changes.\par Mild opacification of the left mastoid air cells.\par \par  [de-identified] : He is seen today for follow up\par On cisplatin etoposide (9/16/20 - present)\par s/p  C3 on 10/28\par He has started radiation (9/23/20 - present). Held last two rounds due to neutropenia - anticipate to end in 11/12/20\par \par Patient reports of feeling better as he gets aways from chemo treatment with improved energy level, numbness and shaking of hands. He's able to walk the dog half a mile a day without difficulty but still having problems with SOB on exertion with climbing stairs. Denies n/v, fever, headaches,dizziness, chest pain, bleeding\par \par Weight - 249 -> 243 -> 244lbs ->242lbs -> 243 lbs -> 237 lbs ->238\par

## 2020-11-10 NOTE — RESULTS/DATA
[FreeTextEntry1] : Labs and images reviewed and discussed\par 11/10/20 wbc 0.9 Hgb 7.6 hct 21.6 plts 38\par BUN/Cr - 25/2.2 (pevilocelsy 35/2.1)\par

## 2020-11-10 NOTE — REVIEW OF SYSTEMS
[Esophagitis: Grade 0] : Esophagitis: Grade 0 [Nausea: Grade 0] : Nausea: Grade 0 [Fatigue: Grade 2 - Fatigue not relieved by rest; limiting instrumental ADL] : Fatigue: Grade 2 - Fatigue not relieved by rest; limiting instrumental ADL [Mucositis Oral: Grade 1 - Asymptomatic or mild symptoms; intervention not indicated] : Mucositis Oral: Grade 1 - Asymptomatic or mild symptoms; intervention not indicated [Cough: Grade 0] : Cough: Grade 0 [Dyspnea: Grade 1 - Shortness of breath with moderate exertion] : Dyspnea: Grade 1 - Shortness of breath with moderate exertion [Hiccups: Grade 0] : Hiccups: Grade 0 [Hoarseness: Grade 0] : Hoarseness: Grade 0 [Dermatitis Radiation: Grade 0] : Dermatitis Radiation: Grade 0

## 2020-11-11 NOTE — HISTORY OF PRESENT ILLNESS
[FreeTextEntry1] : Mr. Potter is present for OTV. He is on treatment break secondary to neutropenia. He has 3 fractions of radiation remaining at this time. He reports feeling fatigued and short of breath at times. He also reports decreased appetite. He is anticipating his last cycle of chemotherapy next week. \par \par \par Right Lung - SCC, stage IIB,  c T1b N1 MX\par Fraction 30/33, 5400 cGy, completion 11/11/20\par Concurrent chemo w/ Cisplatin (cycle 3 - 10/28)

## 2020-11-11 NOTE — DISEASE MANAGEMENT
[Clinical] : TNM Stage: c [IIB] : IIB [TTNM] : 1b [NTNM] : 1 [MTNM] : X [de-identified] : 5400 cGy [de-identified] : 6600 cGy [de-identified] : right lung

## 2020-11-11 NOTE — DISEASE MANAGEMENT
[Clinical] : TNM Stage: c [IIB] : IIB [TTNM] : 1b [NTNM] : 1 [MTNM] : X [de-identified] : 5400 cGy [de-identified] : 6600 cGy [de-identified] : right lung

## 2020-11-12 ENCOUNTER — RESULT REVIEW (OUTPATIENT)
Age: 65
End: 2020-11-12

## 2020-11-12 ENCOUNTER — APPOINTMENT (OUTPATIENT)
Dept: HEMATOLOGY ONCOLOGY | Facility: CLINIC | Age: 65
End: 2020-11-12
Payer: COMMERCIAL

## 2020-11-12 VITALS
RESPIRATION RATE: 18 BRPM | OXYGEN SATURATION: 96 % | HEIGHT: 70.94 IN | SYSTOLIC BLOOD PRESSURE: 120 MMHG | DIASTOLIC BLOOD PRESSURE: 67 MMHG | HEART RATE: 91 BPM | TEMPERATURE: 97.2 F | BODY MASS INDEX: 34.02 KG/M2 | WEIGHT: 243 LBS

## 2020-11-12 PROCEDURE — 99499A: CUSTOM | Mod: NC

## 2020-11-13 ENCOUNTER — RESULT REVIEW (OUTPATIENT)
Age: 65
End: 2020-11-13

## 2020-11-13 ENCOUNTER — APPOINTMENT (OUTPATIENT)
Dept: HEMATOLOGY ONCOLOGY | Facility: CLINIC | Age: 65
End: 2020-11-13
Payer: COMMERCIAL

## 2020-11-13 VITALS
BODY MASS INDEX: 34.02 KG/M2 | OXYGEN SATURATION: 99 % | DIASTOLIC BLOOD PRESSURE: 68 MMHG | HEART RATE: 93 BPM | HEIGHT: 70.94 IN | RESPIRATION RATE: 18 BRPM | WEIGHT: 243 LBS | TEMPERATURE: 97.6 F | SYSTOLIC BLOOD PRESSURE: 127 MMHG

## 2020-11-13 PROCEDURE — 99214 OFFICE O/P EST MOD 30 MIN: CPT

## 2020-11-13 PROCEDURE — 99072 ADDL SUPL MATRL&STAF TM PHE: CPT

## 2020-11-13 NOTE — HISTORY OF PRESENT ILLNESS
[de-identified] : Mr Potter is a very pleasant 65 years ex smoker (quit 6 months ago, 1 ppd x 40 years), seen today for further management of his small cell lung cancer\par \par He reports "flu like symptoms" starting in March 2020, saw his PCP who obtained a CXR which showed a lung nodule, subsequently had  CT chest revealed 2 RUL nodules (measuring 1.3 cm and 1.1 cm). He was sent to [facility] for biopsy, He reports that his procedure was postponed for multiple weeks, and eventually he was told that he need to see a thoracic surgeon\par He saw Dr Sarthak Fernández, who referred him to IR for image guided biopsy\par \par CT Chest 5/28/2020: Moderate to extensive centrilobular paraseptal emphysema predominantly within bilateral upper lobes. There are 2 adjacent nodular densities noted within the RUL measuring 1.3 cm and 1.1 cm. Evaluation of the rest of the lung fields demonstrates mild subsegmental atelectasis and scaring involving the lingula. There is a 0.2 cm peripheral nodule note in the RLL. Also, there is a 0.2 cm nodule noted involving the superior segment of the RLL. There is also a central nodule noted involving the EMBER which measures 0.3 cm in sized. There are no spiculated nodules. There is no consolidation. \par \par PET/CT 6/23/2020: Mild focal uptake in the 2 adjacent pulmonary nodules in the posterior RUL (max SUV 4.7). No additional FDG avid pulmonary nodules identified. Focally avid right hilar lymph node (max SUV 8.6). No abnormal uptake in the nonenlarged precarinal lymph node. Moderate sized hiatal hernia. No evidence of distant metastases\par \par s/p core biopsy of a right upper lobe lung mass (8/24/20)\par Pathology\par Small cell carcinoma. 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 He also reports that his house is from early 1900s and may have asbestos in the basement\par \par MRI brain (9/8/20)\par No evidence for intracranial metastatic disease.\par No acute infarction, acute intracranial hemorrhage, hydrocephalus or extra-axial fluid collection.\par Mild chronic microvascular ischemic changes.\par Mild opacification of the left mastoid air cells.\par \par  [de-identified] : He is seen today for follow up\par On cisplatin etoposide (9/16/20 - present)\par s/p  C3 on 10/28\par He has started radiation (9/23/20 - present). Held since 11/6/20 due to pancytopenia - patient has 3 more sessions.\par \par Patient reports of continuing to feel better despite all his counts were low and been receiving Zarxio daily since 11/6/20. He has done renal ultrasound, wants to hold off since his kidney function has improved.\par Chronic numbness on hands and feet stay the same. \par Denies n/v, fever, headaches,dizziness, chest pain, bleeding\par \par Weight - 249 -> 243 -> 244lbs ->242lbs -> 243 lbs -> 237 lbs ->238 ->.243 lbs\par

## 2020-11-13 NOTE — ASSESSMENT
[FreeTextEntry1] : #Small cell lung cancer - at least stage IIB\par PET/CT (6/20) No distant metastases\par 9/8/20 Brain MRI- No mets\par \par Treatment: cisplatin etoposide (9/16/20 - present ) - s/p C3 (reduced etoposide due to cytopenia) - last treatment 10/28/20\par Labs reviewed and discussed with patient.\par -Held Radiation since 11/9/20 due to pancytopenia  - patient has 3 more sessions, will resume when counts improved. \par \par Plan\par Cisplatin with etoposide Q3W x 4-6 treatment\par Seen by audiometry\par \par #Pancytopenia \par           1. Neutropenia - improved  ANC -2.1 s/p 6  GCSF injections since 11/6/20 - advised to hold off antiviral, antifungal, and anti-bact prophylactic meds. \par               - Anemia - Hgb 7.6 - asymptomatic -no transfusion needed\par               - thrombocytopenia - plts 45 (previously 38)  - no bleeding. signs and symptoms explained to patient and he understands. Advised to hold Aspirin\par \par #renal insufficiency - improved with IVH 3x/week this week. Now Cr - 1.7 - patient was advised to get renal ultrasound but he wants to hold off since his Cr improved. PO fluid intake advised at home. \par        - 2/2 to chemotherapy? Patient has no diarrhea, no abnormal urinary symptoms. \par \par #Zofran PRN for nausea\par Radiation with Dr. Hidalog (9/23/20 - present) - has two more sessions left, on hold for now due to pancytopenia\par \par #peripheral neuropathy - 2/2 treatment - will closely monitor. ADLs not affected - improved\par #History of Tinnitus - now resolved - seen by audiometry. Patient advised to have follow up with audiometry if it occur again.\par \par #Fatigue and poor PO intake - 2/2 chemotherapy -now improved \par #hx of hypoMg - resolved\par #tremors on hands and weakness -improved\par \par Case and management discussed with Dr. Sanabria \par Follow up next week for office visit and possible C4 treatment if counts improved.\par CBC, CMP, magnesium, phosphorus

## 2020-11-13 NOTE — CONSULT LETTER
[Dear  ___] : Dear  [unfilled], [Courtesy Letter:] : I had the pleasure of seeing your patient, [unfilled], in my office today. [Please see my note below.] : Please see my note below. [Consult Closing:] : Thank you very much for allowing me to participate in the care of this patient.  If you have any questions, please do not hesitate to contact me. [Sincerely,] : Sincerely, [DrSudhakar  ___] : Dr. GOLDMAN [FreeTextEntry3] : Javier Sanabria MD, MPH\par Attending Physician\par Hematology Oncology\par [facility] [place]\par [facility]\par

## 2020-11-16 ENCOUNTER — RESULT REVIEW (OUTPATIENT)
Age: 65
End: 2020-11-16

## 2020-11-16 ENCOUNTER — NON-APPOINTMENT (OUTPATIENT)
Age: 65
End: 2020-11-16

## 2020-11-16 ENCOUNTER — APPOINTMENT (OUTPATIENT)
Dept: HEMATOLOGY ONCOLOGY | Facility: CLINIC | Age: 65
End: 2020-11-16

## 2020-11-16 ENCOUNTER — APPOINTMENT (OUTPATIENT)
Dept: HEMATOLOGY ONCOLOGY | Facility: CLINIC | Age: 65
End: 2020-11-16
Payer: COMMERCIAL

## 2020-11-16 VITALS
TEMPERATURE: 97.2 F | RESPIRATION RATE: 18 BRPM | HEART RATE: 94 BPM | DIASTOLIC BLOOD PRESSURE: 78 MMHG | OXYGEN SATURATION: 97 % | HEIGHT: 70.91 IN | BODY MASS INDEX: 34.02 KG/M2 | WEIGHT: 243 LBS | SYSTOLIC BLOOD PRESSURE: 137 MMHG

## 2020-11-16 PROCEDURE — 99214 OFFICE O/P EST MOD 30 MIN: CPT

## 2020-11-16 PROCEDURE — 99072 ADDL SUPL MATRL&STAF TM PHE: CPT

## 2020-11-16 NOTE — ASSESSMENT
[FreeTextEntry1] : small cell carcinoma of the lung\par s/p cycle 3 etoposide and cisplatin on 10/26\par radiation has been on hold due to neutropenia\par neutropenia resolved wbc 4.9, anc 3.2 today \par anemia- hgb 7.5 not recovering will transfuse 1 unit PRBC's irritated /leukodepletion- risks and benefits explained to patient\par thrombocytopenia- plts up to 85 today no evidence of bleeding \par \par may resume RT and then transfuse 1 unit of PRBC's- case was discussed with Dr. Real- pt to follow up Wed for cycle 4 of tx with Dr. Sanabria or sooner if needed.  Pt verbalized understanding.

## 2020-11-16 NOTE — HISTORY OF PRESENT ILLNESS
[de-identified] : Patient presents today for follow up of neutropenia to see if he can resume RT -feeling fatigued offers no other complaints

## 2020-11-17 ENCOUNTER — NON-APPOINTMENT (OUTPATIENT)
Age: 65
End: 2020-11-17

## 2020-11-17 VITALS
SYSTOLIC BLOOD PRESSURE: 131 MMHG | DIASTOLIC BLOOD PRESSURE: 78 MMHG | RESPIRATION RATE: 18 BRPM | HEART RATE: 88 BPM | HEIGHT: 70 IN | WEIGHT: 243 LBS | BODY MASS INDEX: 34.79 KG/M2 | OXYGEN SATURATION: 98 %

## 2020-11-18 ENCOUNTER — RESULT REVIEW (OUTPATIENT)
Age: 65
End: 2020-11-18

## 2020-11-18 ENCOUNTER — APPOINTMENT (OUTPATIENT)
Dept: HEMATOLOGY ONCOLOGY | Facility: CLINIC | Age: 65
End: 2020-11-18
Payer: COMMERCIAL

## 2020-11-18 VITALS
WEIGHT: 239 LBS | HEIGHT: 70 IN | SYSTOLIC BLOOD PRESSURE: 130 MMHG | BODY MASS INDEX: 34.22 KG/M2 | HEART RATE: 88 BPM | DIASTOLIC BLOOD PRESSURE: 73 MMHG | TEMPERATURE: 97.5 F | OXYGEN SATURATION: 98 % | RESPIRATION RATE: 18 BRPM

## 2020-11-18 PROCEDURE — 99214 OFFICE O/P EST MOD 30 MIN: CPT

## 2020-11-18 RX ORDER — DIPHENHYDRAMINE HYDROCHLORIDE AND LIDOCAINE HYDROCHLORIDE AND ALUMINUM HYDROXIDE AND MAGNESIUM HYDRO
KIT 4 TIMES DAILY
Qty: 1 | Refills: 1 | Status: DISCONTINUED | COMMUNITY
Start: 2020-10-20 | End: 2020-11-18

## 2020-11-18 RX ORDER — FLUCONAZOLE 200 MG/1
200 TABLET ORAL
Qty: 7 | Refills: 1 | Status: DISCONTINUED | COMMUNITY
Start: 2020-11-12 | End: 2020-11-18

## 2020-11-18 RX ORDER — CIPROFLOXACIN HYDROCHLORIDE 500 MG/1
500 TABLET, FILM COATED ORAL DAILY
Qty: 7 | Refills: 1 | Status: DISCONTINUED | COMMUNITY
Start: 2020-11-12 | End: 2020-11-18

## 2020-11-18 RX ORDER — ACYCLOVIR 200 MG/1
200 CAPSULE ORAL
Qty: 14 | Refills: 3 | Status: DISCONTINUED | COMMUNITY
Start: 2020-11-12 | End: 2020-11-18

## 2020-11-18 NOTE — HISTORY OF PRESENT ILLNESS
[FreeTextEntry1] : Mr. Potter is scheduled to complete radiation therapy tomorrow. He report she is feeling better today. He was on break x 1 week for neutropenia. He resumed radiation on Monday but receive 1 PRBCS yesterday. He reports his appetite is fair and his weight is stable at 243 lbs. He is scheduled for cycle 4 of chemotherapy tomorrow. Pateint will complete treatment this week. \par \par Right Lung - SCC, stage IIB,  c T1b N1 MX\par Fraction 30/33, 5400 cGy, completion 11/11/20\par Concurrent chemo w/ Cisplatin (cycle 3 - 10/28)

## 2020-11-18 NOTE — ASSESSMENT
[FreeTextEntry1] : #Small cell lung cancer - at least stage IIB\par PET/CT (6/20) No distant metastases\par 9/8/20 Brain MRI- No mets\par \par Treatment: cisplatin etoposide (9/16/20 - present ) - s/p C3 (reduced etoposide due to cytopenia) - last treatment 10/28/20\par Now on C4 - proceed with treatment\par Labs reviewed and discussed with patient.\par -Radiation finished today 11/18/20\par -to get Neulasta onpro after treatment since he finished with radiation\par \par Plan\par Cisplatin with etoposide Q3W x 4-6 treatment\par Seen by audiometry\par \par #Pancytopenia \par           1. Neutropenia - improved  ANC -3.2. s/p 6  GCSF injections 11/13/20 - will get onpro neulasta now after treatment \par               - Anemia - Hgb 7.9 - asymptomatic -s/p 1 unit 11/16/20\par               - thrombocytopenia - Yareli plts  38 - improved plts 110\par \par #renal insufficiency - improved with IVH 3x/week this week. Now Cr - 1.8 - patient was advised to get renal ultrasound but he wants to hold off since his Cr improved. PO fluid intake advised at home. \par        - 2/2 to chemotherapy? Patient has no diarrhea, no abnormal urinary symptoms. \par \par #Zofran PRN for nausea\par Radiation with Dr. Hidalgo (9/23/20 - present) - has two more sessions left, on hold for now due to pancytopenia\par \par #peripheral neuropathy - 2/2 treatment - will closely monitor. ADLs not affected - improved\par #History of Tinnitus - now resolved - seen by audiometry. Patient advised to have follow up with audiometry if it occur again.\par \par #Fatigue and poor PO intake - 2/2 chemotherapy -now improved \par #hx of hypoMg - resolved\par #tremors on hands and weakness -improved\par \par Case and management discussed with Dr. Sanabria \par Follow up next week for office visit for yareli visit \par CBC, CMP, magnesium, phosphorus

## 2020-11-18 NOTE — PHYSICAL EXAM
[Normal] : oriented to person, place and time, the affect was normal, the mood was normal and not anxious [de-identified] : no erythema or hyperpigmentation

## 2020-11-18 NOTE — HISTORY OF PRESENT ILLNESS
[de-identified] : Mr Potter is a very pleasant 65 years ex smoker (quit 6 months ago, 1 ppd x 40 years), seen today for further management of his small cell lung cancer\par \par He reports "flu like symptoms" starting in March 2020, saw his PCP who obtained a CXR which showed a lung nodule, subsequently had  CT chest revealed 2 RUL nodules (measuring 1.3 cm and 1.1 cm). He was sent to [facility] for biopsy, He reports that his procedure was postponed for multiple weeks, and eventually he was told that he need to see a thoracic surgeon\par He saw Dr Sarthak Fernández, who referred him to IR for image guided biopsy\par \par CT Chest 5/28/2020: Moderate to extensive centrilobular paraseptal emphysema predominantly within bilateral upper lobes. There are 2 adjacent nodular densities noted within the RUL measuring 1.3 cm and 1.1 cm. Evaluation of the rest of the lung fields demonstrates mild subsegmental atelectasis and scaring involving the lingula. There is a 0.2 cm peripheral nodule note in the RLL. Also, there is a 0.2 cm nodule noted involving the superior segment of the RLL. There is also a central nodule noted involving the EMBER which measures 0.3 cm in sized. There are no spiculated nodules. There is no consolidation. \par \par PET/CT 6/23/2020: Mild focal uptake in the 2 adjacent pulmonary nodules in the posterior RUL (max SUV 4.7). No additional FDG avid pulmonary nodules identified. Focally avid right hilar lymph node (max SUV 8.6). No abnormal uptake in the nonenlarged precarinal lymph node. Moderate sized hiatal hernia. No evidence of distant metastases\par \par s/p core biopsy of a right upper lobe lung mass (8/24/20)\par Pathology\par Small cell carcinoma. 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 He also reports that his house is from early 1900s and may have asbestos in the basement\par \par MRI brain (9/8/20)\par No evidence for intracranial metastatic disease.\par No acute infarction, acute intracranial hemorrhage, hydrocephalus or extra-axial fluid collection.\par Mild chronic microvascular ischemic changes.\par Mild opacification of the left mastoid air cells.\par \par  [de-identified] : He is seen today for follow up\par On cisplatin etoposide (9/16/20 - present) - now on C4\par He has started radiation (9/23/20 - 11/18/20). Held since 11/9/20 -11/16/20 for neutropenia\par \par Patient reports feeling better since received 1 unit PRBCs 11/16/20.  Still with \par Chronic numbness on hands and feet stay the same. \par Denies n/v, fever, headaches,dizziness, chest pain, bleeding\par \par Weight - 249 -> 243 -> 244lbs ->242lbs -> 243 lbs -> 237 lbs ->238 ->.243 lbs ->239lbs\par

## 2020-11-18 NOTE — DISEASE MANAGEMENT
[Clinical] : TNM Stage: c [IIB] : IIB [TTNM] : 1b [NTNM] : 1 [MTNM] : X [de-identified] : 5400 cGy [de-identified] : 6600 cGy [de-identified] : right lung

## 2020-11-18 NOTE — CONSULT LETTER
[Dear  ___] : Dear  [unfilled], [Courtesy Letter:] : I had the pleasure of seeing your patient, [unfilled], in my office today. [Please see my note below.] : Please see my note below. [Consult Closing:] : Thank you very much for allowing me to participate in the care of this patient.  If you have any questions, please do not hesitate to contact me. [Sincerely,] : Sincerely, [DrSudhakar  ___] : Dr. GOLDMAN [FreeTextEntry3] : Javier Sanabria MD, MPH\par Attending Physician\par Hematology Oncology\par [facility] [place]\par [facility]\par

## 2020-11-25 ENCOUNTER — RESULT REVIEW (OUTPATIENT)
Age: 65
End: 2020-11-25

## 2020-11-25 ENCOUNTER — APPOINTMENT (OUTPATIENT)
Dept: HEMATOLOGY ONCOLOGY | Facility: CLINIC | Age: 65
End: 2020-11-25
Payer: COMMERCIAL

## 2020-11-25 VITALS
RESPIRATION RATE: 18 BRPM | TEMPERATURE: 97 F | OXYGEN SATURATION: 97 % | BODY MASS INDEX: 33.07 KG/M2 | WEIGHT: 231 LBS | DIASTOLIC BLOOD PRESSURE: 63 MMHG | SYSTOLIC BLOOD PRESSURE: 99 MMHG | HEIGHT: 70 IN | HEART RATE: 95 BPM

## 2020-11-25 PROCEDURE — 99215 OFFICE O/P EST HI 40 MIN: CPT

## 2020-11-25 NOTE — CONSULT LETTER
[Dear  ___] : Dear  [unfilled], [Courtesy Letter:] : I had the pleasure of seeing your patient, [unfilled], in my office today. [Please see my note below.] : Please see my note below. [Consult Closing:] : Thank you very much for allowing me to participate in the care of this patient.  If you have any questions, please do not hesitate to contact me. [Sincerely,] : Sincerely, [DrSudhakar  ___] : Dr. GOLDMAN [FreeTextEntry3] : Javier Sanabria MD, MPH\par Attending Physician\par Hematology Oncology\par [facility] [place]\par [facility]\par

## 2020-11-25 NOTE — ASSESSMENT
[FreeTextEntry1] : #Small cell lung cancer - at least stage IIB\par PET/CT (6/20) No distant metastases\par 9/8/20 Brain MRI- No mets\par -Radiation finished 11/18/20\par \par Treatment: cisplatin etoposide (9/16/20 - present ) - s/p C4 last week\par Labs reviewed and discussed with patient.\par Persistent cytopenia- from chemotherapy. He did receive Neulasta onpro after treatment \par WIll switch his treatment to carboplatin and etoposide\par Plan 6 cycles in total\par Will repeat PET in 6-8 weeks after completion of radiation\par \par LOWELL\par Likely from dehydration vs cisplatin\par Switch to carboplatin next treatment\par \par Peripheral neuropathy - 2/2 treatment - will closely monitor. ADLs not affected - improved\par #History of Tinnitus - now resolved - seen by audiometry. Patient advised to have follow up with audiometry if it occur again.\par \par #Fatigue and poor PO intake - 2/2 chemotherapy  \par #hx of hypoMg - resolved\par #tremors on hands and weakness -improved\par \par Follow up in 3 weeks for C5\par CBC, CMP, magnesium, phosphorus

## 2020-11-25 NOTE — HISTORY OF PRESENT ILLNESS
[de-identified] : Mr Potter is a very pleasant 65 years ex smoker (quit 6 months ago, 1 ppd x 40 years), seen today for further management of his small cell lung cancer\par \par He reports "flu like symptoms" starting in March 2020, saw his PCP who obtained a CXR which showed a lung nodule, subsequently had  CT chest revealed 2 RUL nodules (measuring 1.3 cm and 1.1 cm). He was sent to [facility] for biopsy, He reports that his procedure was postponed for multiple weeks, and eventually he was told that he need to see a thoracic surgeon\par He saw Dr Sarthak Fernández, who referred him to IR for image guided biopsy\par \par CT Chest 5/28/2020: Moderate to extensive centrilobular paraseptal emphysema predominantly within bilateral upper lobes. There are 2 adjacent nodular densities noted within the RUL measuring 1.3 cm and 1.1 cm. Evaluation of the rest of the lung fields demonstrates mild subsegmental atelectasis and scaring involving the lingula. There is a 0.2 cm peripheral nodule note in the RLL. Also, there is a 0.2 cm nodule noted involving the superior segment of the RLL. There is also a central nodule noted involving the EMBER which measures 0.3 cm in sized. There are no spiculated nodules. There is no consolidation. \par \par PET/CT 6/23/2020: Mild focal uptake in the 2 adjacent pulmonary nodules in the posterior RUL (max SUV 4.7). No additional FDG avid pulmonary nodules identified. Focally avid right hilar lymph node (max SUV 8.6). No abnormal uptake in the nonenlarged precarinal lymph node. Moderate sized hiatal hernia. No evidence of distant metastases\par \par s/p core biopsy of a right upper lobe lung mass (8/24/20)\par Pathology\par Small cell carcinoma. 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 He also reports that his house is from early 1900s and may have asbestos in the basement\par \par MRI brain (9/8/20)\par No evidence for intracranial metastatic disease.\par No acute infarction, acute intracranial hemorrhage, hydrocephalus or extra-axial fluid collection.\par Mild chronic microvascular ischemic changes.\par Mild opacification of the left mastoid air cells.\par \par  [de-identified] : He is seen today for follow up\par s/p C4 last week (11/18-11/20)\par On cisplatin etoposide (9/16/20 - present) \par s/p radiation (9/23/20 - 11/18/20). Held since 11/9/20 -11/16/20 for neutropenia\par \par He reports feeling tired and exhausted\par Remains functional\par Has cough with clear expectoration\par No fever\par He has lost 8 lbs over 1 week\par Admits to staying hydrated\par \par Weight - 249 -> 243 -> 244lbs ->242lbs -> 243 lbs -> 237 lbs ->238 ->.243 lbs ->239lbs -> 231 lbs\par

## 2020-12-22 NOTE — CONSULT LETTER
[Dear  ___] : Dear  [unfilled], [Courtesy Letter:] : I had the pleasure of seeing your patient, [unfilled], in my office today. [Please see my note below.] : Please see my note below. [Consult Closing:] : Thank you very much for allowing me to participate in the care of this patient.  If you have any questions, please do not hesitate to contact me. [Sincerely,] : Sincerely, [DrSudhakar  ___] : Dr. GOLDMAN [FreeTextEntry3] : Javier Sanabria MD, MPH\par Attending Physician\par Hematology Oncology\par [facility] [place]\par [facility]\par

## 2020-12-22 NOTE — HISTORY OF PRESENT ILLNESS
[de-identified] : Mr Potter is a very pleasant 65 years ex smoker (quit 6 months ago, 1 ppd x 40 years), seen today for further management of his small cell lung cancer\par \par He reports "flu like symptoms" starting in March 2020, saw his PCP who obtained a CXR which showed a lung nodule, subsequently had  CT chest revealed 2 RUL nodules (measuring 1.3 cm and 1.1 cm). He was sent to [facility] for biopsy, He reports that his procedure was postponed for multiple weeks, and eventually he was told that he need to see a thoracic surgeon\par He saw Dr Sarthak Fernández, who referred him to IR for image guided biopsy\par \par CT Chest 5/28/2020: Moderate to extensive centrilobular paraseptal emphysema predominantly within bilateral upper lobes. There are 2 adjacent nodular densities noted within the RUL measuring 1.3 cm and 1.1 cm. Evaluation of the rest of the lung fields demonstrates mild subsegmental atelectasis and scaring involving the lingula. There is a 0.2 cm peripheral nodule note in the RLL. Also, there is a 0.2 cm nodule noted involving the superior segment of the RLL. There is also a central nodule noted involving the EMBER which measures 0.3 cm in sized. There are no spiculated nodules. There is no consolidation. \par \par PET/CT 6/23/2020: Mild focal uptake in the 2 adjacent pulmonary nodules in the posterior RUL (max SUV 4.7). No additional FDG avid pulmonary nodules identified. Focally avid right hilar lymph node (max SUV 8.6). No abnormal uptake in the nonenlarged precarinal lymph node. Moderate sized hiatal hernia. No evidence of distant metastases\par \par s/p core biopsy of a right upper lobe lung mass (8/24/20)\par Pathology\par Small cell carcinoma. 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 He also reports that his house is from early 1900s and may have asbestos in the basement\par \par MRI brain (9/8/20)\par No evidence for intracranial metastatic disease.\par No acute infarction, acute intracranial hemorrhage, hydrocephalus or extra-axial fluid collection.\par Mild chronic microvascular ischemic changes.\par Mild opacification of the left mastoid air cells.\par \par  [de-identified] : He is seen today for follow up and C5 carboplatin etoposide (12/22/20)\par s/p C4 last week (11/18-11/20)\par On cisplatin etoposide (9/16/20 - present) \par s/p radiation (9/23/20 - 11/18/20). Held since 11/9/20 -11/16/20 for neutropenia\par \par He reports feeling tired and exhausted\par Remains functional\par \par Has cough with clear expectoration\par No fever\par He has gained 1 lbs since the last appointment \par \par Weight - 249 -> 243 -> 244lbs ->242lbs -> 243 lbs -> 237 lbs ->238 ->.243 lbs ->239lbs -> 231 lbs ->232 lbs\par

## 2020-12-22 NOTE — ASSESSMENT
[FreeTextEntry1] : #Small cell lung cancer - at least stage IIB\par PET/CT (6/20) No distant metastases\par 9/8/20 Brain MRI- No mets\par -Radiation finished 11/18/20\par \par Treatment: cisplatin etoposide x 4 cycles (9/16/20 - 11/20/20) \par Here for C5\par Will switch carboplatin and etoposide\par Carbo AUC 4 and dose reduce etoposide 10%\par Labs reviewed and discussed with patient.\par Transfuse 2 units PRBC over the 3 days of treatment\par Plan 6 cycles in total\par Will repeat PET prior to the next treatment \par \par LOWELL\par Likely from dehydration vs cisplatin\par Switch to carboplatin next treatment\par \par Peripheral neuropathy - 2/2 treatment - will closely monitor. ADLs not affected - improved\par #History of Tinnitus - now resolved - seen by audiometry. Patient advised to have follow up with audiometry if it occur again.\par \par #Fatigue and poor PO intake - 2/2 chemotherapy  \par #hx of hypoMg - resolved\par #tremors on hands and weakness -improved\par \par Follow up in 1 week for yareli labs and 3 weeks for C6\par CBC, CMP, magnesium, phosphorus

## 2020-12-29 NOTE — RESULTS/DATA
[FreeTextEntry1] : Labs and images reviewed and discussed\par 12/29/20 wbc 3.4 hgb 8.1 hct 23.4 plts 155\par BUN/Cr - 45/2.2

## 2020-12-29 NOTE — HISTORY OF PRESENT ILLNESS
[de-identified] : Mr Potter is a very pleasant 65 years ex smoker (quit 6 months ago, 1 ppd x 40 years), seen today for further management of his small cell lung cancer\par \par He reports "flu like symptoms" starting in March 2020, saw his PCP who obtained a CXR which showed a lung nodule, subsequently had  CT chest revealed 2 RUL nodules (measuring 1.3 cm and 1.1 cm). He was sent to [facility] for biopsy, He reports that his procedure was postponed for multiple weeks, and eventually he was told that he need to see a thoracic surgeon\par He saw Dr Sarthak Fernández, who referred him to IR for image guided biopsy\par \par CT Chest 5/28/2020: Moderate to extensive centrilobular paraseptal emphysema predominantly within bilateral upper lobes. There are 2 adjacent nodular densities noted within the RUL measuring 1.3 cm and 1.1 cm. Evaluation of the rest of the lung fields demonstrates mild subsegmental atelectasis and scaring involving the lingula. There is a 0.2 cm peripheral nodule note in the RLL. Also, there is a 0.2 cm nodule noted involving the superior segment of the RLL. There is also a central nodule noted involving the EMBER which measures 0.3 cm in sized. There are no spiculated nodules. There is no consolidation. \par \par PET/CT 6/23/2020: Mild focal uptake in the 2 adjacent pulmonary nodules in the posterior RUL (max SUV 4.7). No additional FDG avid pulmonary nodules identified. Focally avid right hilar lymph node (max SUV 8.6). No abnormal uptake in the nonenlarged precarinal lymph node. Moderate sized hiatal hernia. No evidence of distant metastases\par \par s/p core biopsy of a right upper lobe lung mass (8/24/20)\par Pathology\par Small cell carcinoma. 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 He also reports that his house is from early 1900s and may have asbestos in the basement\par \par MRI brain (9/8/20)\par No evidence for intracranial metastatic disease.\par No acute infarction, acute intracranial hemorrhage, hydrocephalus or extra-axial fluid collection.\par Mild chronic microvascular ischemic changes.\par Mild opacification of the left mastoid air cells.\par \par  [de-identified] : He is seen today for follow up, s/p  C5 carboplatin etoposide (12/22/20)\par s/p C4 last week (11/18-11/20)\par On cisplatin etoposide (9/16/20 - present) \par s/p radiation (9/23/20 - 11/18/20). Held since 11/9/20 -11/16/20 for neutropenia\par \par Patient repots of feeling better with 2 units of PRBCs last week with C5 chemo treatment. He has not received his flu or pneumonia shots, want to get them in two weeks with his chemo treatment. Denies n/v, fever, headaches, dizziness, chest pain/palpitation, rash, bleeding, SOB/BERNSTEIN. \par \par Weight - 249 -> 243 -> 244lbs ->242lbs -> 243 lbs -> 237 lbs ->238 ->.243 lbs ->239lbs -> 231 lbs ->232 lbs ->228lbs\par

## 2020-12-29 NOTE — CONSULT LETTER
[Dear  ___] : Dear  [unfilled], [Courtesy Letter:] : I had the pleasure of seeing your patient, [unfilled], in my office today. [Please see my note below.] : Please see my note below. [Consult Closing:] : Thank you very much for allowing me to participate in the care of this patient.  If you have any questions, please do not hesitate to contact me. [Sincerely,] : Sincerely, [DrSudhakar  ___] : Dr. GOLDMAN [FreeTextEntry3] : Javier Sanabria MD, MPH\par Attending Physician\par Hematology Oncology\par [facility] [place]\par [facility]\par

## 2020-12-29 NOTE — ASSESSMENT
[FreeTextEntry1] : #Small cell lung cancer - at least stage IIB\par PET/CT (6/20) No distant metastases\par 9/8/20 Brain MRI- No mets\par -Radiation finished 11/18/20\par \par #Treatment: cisplatin etoposide x 4 cycles (9/16/20 - 11/20/20) \par 12/22/20 C5 with Carboplatin/Etoposide -  Carbo AUC 4 and dose reduce etoposide 10%\par Labs reviewed and discussed with patient.\par Plan 6 cycles in total\par Will repeat PET after C6 treatment\par \par #Anemia - s/p Transfuse 2 units PRBC last week with treatment - 2/2 chemo (?) - Hgb 8.1 - Iron panel ordered\par clinically feeling better. \par \par #LOWELL\par Likely from dehydration vs cisplatin\par Switched to carboplatin last treatment\par slightly improved - advised to increase fluid intake. \par \par Peripheral neuropathy - 2/2 treatment - will closely monitor. ADLs not affected - improved\par #History of Tinnitus - now resolved - seen by audiometry. Patient advised to have follow up with audiometry if it occur again.\par \par #Fatigue and poor PO intake - 2/2 chemotherapy  \par #hx of hypoMg - resolved\par #tremors on hands and weakness -improved\par \par Follow up in 2 weeks for  C6\par CBC, CMP, magnesium, phosphorus

## 2021-01-01 ENCOUNTER — APPOINTMENT (OUTPATIENT)
Dept: GERIATRICS | Facility: CLINIC | Age: 66
End: 2021-01-01
Payer: COMMERCIAL

## 2021-01-01 ENCOUNTER — RESULT REVIEW (OUTPATIENT)
Age: 66
End: 2021-01-01

## 2021-01-01 ENCOUNTER — NON-APPOINTMENT (OUTPATIENT)
Age: 66
End: 2021-01-01

## 2021-01-01 ENCOUNTER — TRANSCRIPTION ENCOUNTER (OUTPATIENT)
Age: 66
End: 2021-01-01

## 2021-01-01 ENCOUNTER — APPOINTMENT (OUTPATIENT)
Dept: RADIATION ONCOLOGY | Facility: CLINIC | Age: 66
End: 2021-01-01

## 2021-01-01 ENCOUNTER — APPOINTMENT (OUTPATIENT)
Dept: HEMATOLOGY ONCOLOGY | Facility: CLINIC | Age: 66
End: 2021-01-01
Payer: COMMERCIAL

## 2021-01-01 ENCOUNTER — APPOINTMENT (OUTPATIENT)
Dept: HEMATOLOGY ONCOLOGY | Facility: CLINIC | Age: 66
End: 2021-01-01

## 2021-01-01 ENCOUNTER — RX RENEWAL (OUTPATIENT)
Age: 66
End: 2021-01-01

## 2021-01-01 ENCOUNTER — APPOINTMENT (OUTPATIENT)
Dept: RADIATION ONCOLOGY | Facility: CLINIC | Age: 66
End: 2021-01-01
Payer: COMMERCIAL

## 2021-01-01 ENCOUNTER — APPOINTMENT (OUTPATIENT)
Dept: GERIATRICS | Facility: CLINIC | Age: 66
End: 2021-01-01

## 2021-01-01 VITALS
HEIGHT: 70 IN | HEART RATE: 93 BPM | OXYGEN SATURATION: 98 % | BODY MASS INDEX: 30.49 KG/M2 | TEMPERATURE: 98.2 F | WEIGHT: 213 LBS | SYSTOLIC BLOOD PRESSURE: 120 MMHG | DIASTOLIC BLOOD PRESSURE: 66 MMHG

## 2021-01-01 VITALS
OXYGEN SATURATION: 97 % | HEIGHT: 70 IN | SYSTOLIC BLOOD PRESSURE: 126 MMHG | BODY MASS INDEX: 30.21 KG/M2 | TEMPERATURE: 98.1 F | WEIGHT: 211 LBS | HEART RATE: 96 BPM | RESPIRATION RATE: 16 BRPM | DIASTOLIC BLOOD PRESSURE: 78 MMHG

## 2021-01-01 VITALS
WEIGHT: 200 LBS | BODY MASS INDEX: 28.63 KG/M2 | SYSTOLIC BLOOD PRESSURE: 118 MMHG | TEMPERATURE: 98.1 F | HEIGHT: 70 IN | RESPIRATION RATE: 16 BRPM | HEART RATE: 107 BPM | DIASTOLIC BLOOD PRESSURE: 75 MMHG | OXYGEN SATURATION: 96 %

## 2021-01-01 VITALS
TEMPERATURE: 96.5 F | HEART RATE: 92 BPM | BODY MASS INDEX: 31.98 KG/M2 | HEIGHT: 70 IN | RESPIRATION RATE: 18 BRPM | DIASTOLIC BLOOD PRESSURE: 69 MMHG | WEIGHT: 223.4 LBS | OXYGEN SATURATION: 96 % | SYSTOLIC BLOOD PRESSURE: 118 MMHG

## 2021-01-01 VITALS
RESPIRATION RATE: 16 BRPM | DIASTOLIC BLOOD PRESSURE: 79 MMHG | OXYGEN SATURATION: 98 % | BODY MASS INDEX: 31.21 KG/M2 | TEMPERATURE: 98.8 F | WEIGHT: 218 LBS | HEART RATE: 74 BPM | SYSTOLIC BLOOD PRESSURE: 139 MMHG | HEIGHT: 70 IN

## 2021-01-01 VITALS
HEIGHT: 70 IN | SYSTOLIC BLOOD PRESSURE: 119 MMHG | DIASTOLIC BLOOD PRESSURE: 72 MMHG | RESPIRATION RATE: 16 BRPM | BODY MASS INDEX: 32.5 KG/M2 | OXYGEN SATURATION: 99 % | WEIGHT: 227 LBS | TEMPERATURE: 97.9 F | HEART RATE: 84 BPM

## 2021-01-01 VITALS
OXYGEN SATURATION: 97 % | DIASTOLIC BLOOD PRESSURE: 69 MMHG | RESPIRATION RATE: 16 BRPM | BODY MASS INDEX: 28.77 KG/M2 | WEIGHT: 201 LBS | HEART RATE: 77 BPM | TEMPERATURE: 97.5 F | HEIGHT: 70 IN | SYSTOLIC BLOOD PRESSURE: 148 MMHG

## 2021-01-01 VITALS
HEIGHT: 70 IN | WEIGHT: 213 LBS | OXYGEN SATURATION: 98 % | DIASTOLIC BLOOD PRESSURE: 87 MMHG | SYSTOLIC BLOOD PRESSURE: 135 MMHG | RESPIRATION RATE: 18 BRPM | BODY MASS INDEX: 30.49 KG/M2 | HEART RATE: 77 BPM

## 2021-01-01 VITALS
BODY MASS INDEX: 37 KG/M2 | RESPIRATION RATE: 16 BRPM | OXYGEN SATURATION: 95 % | TEMPERATURE: 97.7 F | DIASTOLIC BLOOD PRESSURE: 68 MMHG | SYSTOLIC BLOOD PRESSURE: 107 MMHG | HEART RATE: 113 BPM | WEIGHT: 196 LBS | HEIGHT: 61.2 IN

## 2021-01-01 VITALS
HEART RATE: 60 BPM | WEIGHT: 186.2 LBS | BODY MASS INDEX: 26.66 KG/M2 | SYSTOLIC BLOOD PRESSURE: 138 MMHG | HEIGHT: 70 IN | TEMPERATURE: 97.6 F | RESPIRATION RATE: 18 BRPM | OXYGEN SATURATION: 98 % | DIASTOLIC BLOOD PRESSURE: 91 MMHG

## 2021-01-01 VITALS
RESPIRATION RATE: 18 BRPM | DIASTOLIC BLOOD PRESSURE: 73 MMHG | HEART RATE: 64 BPM | TEMPERATURE: 98.5 F | WEIGHT: 184.7 LBS | HEIGHT: 70 IN | OXYGEN SATURATION: 95 % | BODY MASS INDEX: 26.44 KG/M2 | SYSTOLIC BLOOD PRESSURE: 126 MMHG

## 2021-01-01 VITALS
RESPIRATION RATE: 18 BRPM | TEMPERATURE: 97.6 F | HEIGHT: 70 IN | DIASTOLIC BLOOD PRESSURE: 70 MMHG | HEART RATE: 86 BPM | BODY MASS INDEX: 30.35 KG/M2 | WEIGHT: 212 LBS | OXYGEN SATURATION: 94 % | SYSTOLIC BLOOD PRESSURE: 118 MMHG

## 2021-01-01 VITALS
RESPIRATION RATE: 16 BRPM | DIASTOLIC BLOOD PRESSURE: 79 MMHG | BODY MASS INDEX: 26.4 KG/M2 | SYSTOLIC BLOOD PRESSURE: 128 MMHG | HEART RATE: 99 BPM | TEMPERATURE: 98.5 F | OXYGEN SATURATION: 95 % | HEIGHT: 70 IN | WEIGHT: 184.38 LBS

## 2021-01-01 VITALS
BODY MASS INDEX: 30.71 KG/M2 | HEART RATE: 95 BPM | TEMPERATURE: 98.1 F | DIASTOLIC BLOOD PRESSURE: 60 MMHG | WEIGHT: 214 LBS | OXYGEN SATURATION: 96 % | SYSTOLIC BLOOD PRESSURE: 110 MMHG

## 2021-01-01 VITALS
HEIGHT: 70 IN | OXYGEN SATURATION: 98 % | BODY MASS INDEX: 31.03 KG/M2 | HEART RATE: 86 BPM | WEIGHT: 216.75 LBS | RESPIRATION RATE: 18 BRPM | DIASTOLIC BLOOD PRESSURE: 81 MMHG | SYSTOLIC BLOOD PRESSURE: 137 MMHG

## 2021-01-01 VITALS
WEIGHT: 225 LBS | DIASTOLIC BLOOD PRESSURE: 64 MMHG | BODY MASS INDEX: 32.21 KG/M2 | OXYGEN SATURATION: 98 % | SYSTOLIC BLOOD PRESSURE: 141 MMHG | RESPIRATION RATE: 18 BRPM | TEMPERATURE: 98.1 F | HEIGHT: 70 IN | HEART RATE: 122 BPM

## 2021-01-01 VITALS
RESPIRATION RATE: 18 BRPM | SYSTOLIC BLOOD PRESSURE: 119 MMHG | DIASTOLIC BLOOD PRESSURE: 71 MMHG | TEMPERATURE: 96.8 F | HEIGHT: 70 IN | OXYGEN SATURATION: 97 % | WEIGHT: 213 LBS | BODY MASS INDEX: 30.49 KG/M2

## 2021-01-01 VITALS
RESPIRATION RATE: 18 BRPM | TEMPERATURE: 97.6 F | OXYGEN SATURATION: 96 % | DIASTOLIC BLOOD PRESSURE: 79 MMHG | SYSTOLIC BLOOD PRESSURE: 124 MMHG | WEIGHT: 188 LBS | HEART RATE: 108 BPM | BODY MASS INDEX: 26.92 KG/M2 | HEIGHT: 70 IN

## 2021-01-01 VITALS
RESPIRATION RATE: 18 BRPM | SYSTOLIC BLOOD PRESSURE: 131 MMHG | OXYGEN SATURATION: 98 % | HEART RATE: 84 BPM | DIASTOLIC BLOOD PRESSURE: 72 MMHG

## 2021-01-01 VITALS
BODY MASS INDEX: 32.78 KG/M2 | SYSTOLIC BLOOD PRESSURE: 148 MMHG | WEIGHT: 229 LBS | RESPIRATION RATE: 18 BRPM | HEIGHT: 70 IN | HEART RATE: 90 BPM | DIASTOLIC BLOOD PRESSURE: 77 MMHG | OXYGEN SATURATION: 99 % | TEMPERATURE: 99.7 F

## 2021-01-01 VITALS
WEIGHT: 223 LBS | RESPIRATION RATE: 16 BRPM | DIASTOLIC BLOOD PRESSURE: 76 MMHG | TEMPERATURE: 98.6 F | HEART RATE: 91 BPM | OXYGEN SATURATION: 99 % | SYSTOLIC BLOOD PRESSURE: 137 MMHG | BODY MASS INDEX: 31.92 KG/M2 | HEIGHT: 70 IN

## 2021-01-01 VITALS
WEIGHT: 213 LBS | DIASTOLIC BLOOD PRESSURE: 65 MMHG | TEMPERATURE: 97.6 F | HEIGHT: 70 IN | OXYGEN SATURATION: 97 % | BODY MASS INDEX: 30.49 KG/M2 | SYSTOLIC BLOOD PRESSURE: 111 MMHG | HEART RATE: 87 BPM | RESPIRATION RATE: 16 BRPM

## 2021-01-01 VITALS
DIASTOLIC BLOOD PRESSURE: 78 MMHG | RESPIRATION RATE: 18 BRPM | OXYGEN SATURATION: 98 % | BODY MASS INDEX: 32.64 KG/M2 | TEMPERATURE: 99.3 F | HEART RATE: 100 BPM | SYSTOLIC BLOOD PRESSURE: 150 MMHG | WEIGHT: 228 LBS | HEIGHT: 70 IN

## 2021-01-01 VITALS
RESPIRATION RATE: 16 BRPM | TEMPERATURE: 96.8 F | DIASTOLIC BLOOD PRESSURE: 75 MMHG | OXYGEN SATURATION: 95 % | HEART RATE: 106 BPM | HEIGHT: 70 IN | SYSTOLIC BLOOD PRESSURE: 133 MMHG

## 2021-01-01 VITALS
HEART RATE: 72 BPM | BODY MASS INDEX: 26.34 KG/M2 | WEIGHT: 184 LBS | RESPIRATION RATE: 15 BRPM | OXYGEN SATURATION: 95 % | DIASTOLIC BLOOD PRESSURE: 55 MMHG | TEMPERATURE: 97.6 F | SYSTOLIC BLOOD PRESSURE: 89 MMHG | HEIGHT: 70 IN

## 2021-01-01 VITALS
BODY MASS INDEX: 31.07 KG/M2 | WEIGHT: 217 LBS | OXYGEN SATURATION: 98 % | SYSTOLIC BLOOD PRESSURE: 141 MMHG | HEART RATE: 84 BPM | RESPIRATION RATE: 18 BRPM | HEIGHT: 70 IN | DIASTOLIC BLOOD PRESSURE: 83 MMHG

## 2021-01-01 VITALS
HEART RATE: 93 BPM | RESPIRATION RATE: 18 BRPM | SYSTOLIC BLOOD PRESSURE: 126 MMHG | BODY MASS INDEX: 31.5 KG/M2 | DIASTOLIC BLOOD PRESSURE: 76 MMHG | OXYGEN SATURATION: 99 % | TEMPERATURE: 97 F | HEIGHT: 70 IN | WEIGHT: 220 LBS

## 2021-01-01 VITALS
HEIGHT: 70 IN | OXYGEN SATURATION: 97 % | BODY MASS INDEX: 26.05 KG/M2 | DIASTOLIC BLOOD PRESSURE: 72 MMHG | TEMPERATURE: 98.3 F | SYSTOLIC BLOOD PRESSURE: 114 MMHG | HEART RATE: 100 BPM | RESPIRATION RATE: 16 BRPM | WEIGHT: 182 LBS

## 2021-01-01 VITALS
OXYGEN SATURATION: 98 % | TEMPERATURE: 98 F | DIASTOLIC BLOOD PRESSURE: 76 MMHG | HEIGHT: 70 IN | SYSTOLIC BLOOD PRESSURE: 135 MMHG | WEIGHT: 224 LBS | BODY MASS INDEX: 32.07 KG/M2 | HEART RATE: 97 BPM | RESPIRATION RATE: 18 BRPM

## 2021-01-01 VITALS
WEIGHT: 182.3 LBS | OXYGEN SATURATION: 96 % | HEART RATE: 114 BPM | DIASTOLIC BLOOD PRESSURE: 83 MMHG | SYSTOLIC BLOOD PRESSURE: 145 MMHG | BODY MASS INDEX: 25.52 KG/M2 | HEIGHT: 71 IN | RESPIRATION RATE: 18 BRPM | TEMPERATURE: 98.4 F

## 2021-01-01 VITALS
OXYGEN SATURATION: 96 % | SYSTOLIC BLOOD PRESSURE: 124 MMHG | WEIGHT: 206 LBS | HEART RATE: 87 BPM | HEIGHT: 70 IN | BODY MASS INDEX: 29.49 KG/M2 | DIASTOLIC BLOOD PRESSURE: 78 MMHG | RESPIRATION RATE: 16 BRPM | TEMPERATURE: 98 F

## 2021-01-01 VITALS
HEART RATE: 86 BPM | BODY MASS INDEX: 31.78 KG/M2 | HEIGHT: 70 IN | DIASTOLIC BLOOD PRESSURE: 74 MMHG | SYSTOLIC BLOOD PRESSURE: 147 MMHG | TEMPERATURE: 98.5 F | RESPIRATION RATE: 18 BRPM | WEIGHT: 222 LBS | OXYGEN SATURATION: 96 %

## 2021-01-01 DIAGNOSIS — C79.70 SECONDARY MALIGNANT NEOPLASM OF UNSPECIFIED ADRENAL GLAND: ICD-10-CM

## 2021-01-01 DIAGNOSIS — F41.9 ANXIETY DISORDER, UNSPECIFIED: ICD-10-CM

## 2021-01-01 DIAGNOSIS — D70.1 AGRANULOCYTOSIS SECONDARY TO CANCER CHEMOTHERAPY: ICD-10-CM

## 2021-01-01 DIAGNOSIS — R63.4 ABNORMAL WEIGHT LOSS: ICD-10-CM

## 2021-01-01 DIAGNOSIS — Z51.11 ENCOUNTER FOR ANTINEOPLASTIC CHEMOTHERAPY: ICD-10-CM

## 2021-01-01 DIAGNOSIS — R42 DIZZINESS AND GIDDINESS: ICD-10-CM

## 2021-01-01 DIAGNOSIS — R41.82 ALTERED MENTAL STATUS, UNSPECIFIED: ICD-10-CM

## 2021-01-01 DIAGNOSIS — Z79.899 OTHER LONG TERM (CURRENT) DRUG THERAPY: ICD-10-CM

## 2021-01-01 DIAGNOSIS — D69.6 THROMBOCYTOPENIA, UNSPECIFIED: ICD-10-CM

## 2021-01-01 DIAGNOSIS — R53.83 OTHER FATIGUE: ICD-10-CM

## 2021-01-01 DIAGNOSIS — Z71.89 OTHER SPECIFIED COUNSELING: ICD-10-CM

## 2021-01-01 DIAGNOSIS — R19.7 DIARRHEA, UNSPECIFIED: ICD-10-CM

## 2021-01-01 DIAGNOSIS — S22.49XA MULTIPLE FRACTURES OF RIBS, UNSPECIFIED SIDE, INITIAL ENCOUNTER FOR CLOSED FRACTURE: ICD-10-CM

## 2021-01-01 DIAGNOSIS — B37.0 CANDIDAL STOMATITIS: ICD-10-CM

## 2021-01-01 DIAGNOSIS — R68.83 CHILLS (WITHOUT FEVER): ICD-10-CM

## 2021-01-01 DIAGNOSIS — G89.3 NEOPLASM RELATED PAIN (ACUTE) (CHRONIC): ICD-10-CM

## 2021-01-01 DIAGNOSIS — T45.1X5A AGRANULOCYTOSIS SECONDARY TO CANCER CHEMOTHERAPY: ICD-10-CM

## 2021-01-01 DIAGNOSIS — R10.9 UNSPECIFIED ABDOMINAL PAIN: ICD-10-CM

## 2021-01-01 DIAGNOSIS — C34.90 MALIGNANT NEOPLASM OF UNSPECIFIED PART OF UNSPECIFIED BRONCHUS OR LUNG: ICD-10-CM

## 2021-01-01 DIAGNOSIS — C79.31 SECONDARY MALIGNANT NEOPLASM OF BRAIN: ICD-10-CM

## 2021-01-01 DIAGNOSIS — N17.9 ACUTE KIDNEY FAILURE, UNSPECIFIED: ICD-10-CM

## 2021-01-01 DIAGNOSIS — Z79.891 LONG TERM (CURRENT) USE OF OPIATE ANALGESIC: ICD-10-CM

## 2021-01-01 DIAGNOSIS — C79.51 SECONDARY MALIGNANT NEOPLASM OF BONE: ICD-10-CM

## 2021-01-01 DIAGNOSIS — C80.1 SECONDARY MALIGNANT NEOPLASM OF LIVER AND INTRAHEPATIC BILE DUCT: ICD-10-CM

## 2021-01-01 DIAGNOSIS — R25.1 TREMOR, UNSPECIFIED: ICD-10-CM

## 2021-01-01 DIAGNOSIS — Z78.9 OTHER SPECIFIED HEALTH STATUS: ICD-10-CM

## 2021-01-01 DIAGNOSIS — D61.818 OTHER PANCYTOPENIA: ICD-10-CM

## 2021-01-01 DIAGNOSIS — R53.81 OTHER MALAISE: ICD-10-CM

## 2021-01-01 DIAGNOSIS — D64.9 ANEMIA, UNSPECIFIED: ICD-10-CM

## 2021-01-01 DIAGNOSIS — R16.0 HEPATOMEGALY, NOT ELSEWHERE CLASSIFIED: ICD-10-CM

## 2021-01-01 DIAGNOSIS — G62.0 DRUG-INDUCED POLYNEUROPATHY: ICD-10-CM

## 2021-01-01 DIAGNOSIS — K59.09 OTHER CONSTIPATION: ICD-10-CM

## 2021-01-01 DIAGNOSIS — E86.0 DEHYDRATION: ICD-10-CM

## 2021-01-01 DIAGNOSIS — T45.1X5A DRUG-INDUCED POLYNEUROPATHY: ICD-10-CM

## 2021-01-01 DIAGNOSIS — R29.6 REPEATED FALLS: ICD-10-CM

## 2021-01-01 DIAGNOSIS — R91.8 OTHER NONSPECIFIC ABNORMAL FINDING OF LUNG FIELD: ICD-10-CM

## 2021-01-01 DIAGNOSIS — C79.51 NEOPLASM RELATED PAIN (ACUTE) (CHRONIC): ICD-10-CM

## 2021-01-01 DIAGNOSIS — C78.7 SECONDARY MALIGNANT NEOPLASM OF LIVER AND INTRAHEPATIC BILE DUCT: ICD-10-CM

## 2021-01-01 DIAGNOSIS — R74.01 ELEVATION OF LEVELS OF LIVER TRANSAMINASE LEVELS: ICD-10-CM

## 2021-01-01 DIAGNOSIS — N28.9 DISORDER OF KIDNEY AND URETER, UNSPECIFIED: ICD-10-CM

## 2021-01-01 DIAGNOSIS — Z11.59 ENCOUNTER FOR SCREENING FOR OTHER VIRAL DISEASES: ICD-10-CM

## 2021-01-01 PROCEDURE — 99215 OFFICE O/P EST HI 40 MIN: CPT

## 2021-01-01 PROCEDURE — 99215 OFFICE O/P EST HI 40 MIN: CPT | Mod: 25

## 2021-01-01 PROCEDURE — 36415 COLL VENOUS BLD VENIPUNCTURE: CPT

## 2021-01-01 PROCEDURE — 99214 OFFICE O/P EST MOD 30 MIN: CPT

## 2021-01-01 PROCEDURE — 99072 ADDL SUPL MATRL&STAF TM PHE: CPT

## 2021-01-01 PROCEDURE — 99214 OFFICE O/P EST MOD 30 MIN: CPT | Mod: 25

## 2021-01-01 PROCEDURE — 99205 OFFICE O/P NEW HI 60 MIN: CPT

## 2021-01-01 PROCEDURE — 99024 POSTOP FOLLOW-UP VISIT: CPT

## 2021-01-01 RX ORDER — METOCLOPRAMIDE 10 MG/1
10 TABLET ORAL
Qty: 60 | Refills: 11 | Status: ACTIVE | COMMUNITY
Start: 2021-01-01 | End: 1900-01-01

## 2021-01-01 RX ORDER — HYDROMORPHONE HYDROCHLORIDE 4 MG/1
4 TABLET ORAL
Qty: 180 | Refills: 0 | Status: ACTIVE | COMMUNITY
Start: 2021-01-01 | End: 1900-01-01

## 2021-01-01 RX ORDER — FLUCONAZOLE 100 MG/1
100 TABLET ORAL DAILY
Qty: 3 | Refills: 0 | Status: ACTIVE | COMMUNITY
Start: 2021-01-01 | End: 1900-01-01

## 2021-01-01 RX ORDER — ALPRAZOLAM 1 MG/1
1 TABLET ORAL
Qty: 180 | Refills: 0 | Status: ACTIVE | COMMUNITY
Start: 2020-09-03 | End: 1900-01-01

## 2021-01-01 RX ORDER — ATORVASTATIN CALCIUM 10 MG/1
10 TABLET, FILM COATED ORAL
Qty: 90 | Refills: 0 | Status: ACTIVE | COMMUNITY
Start: 2020-03-17

## 2021-01-01 RX ORDER — PANTOPRAZOLE 40 MG/1
40 TABLET, DELAYED RELEASE ORAL
Qty: 90 | Refills: 0 | Status: ACTIVE | COMMUNITY
Start: 2020-08-12

## 2021-01-01 RX ORDER — METHADONE HYDROCHLORIDE 10 MG/1
10 TABLET ORAL EVERY 8 HOURS
Qty: 765 | Refills: 0 | Status: DISCONTINUED | COMMUNITY
Start: 2021-01-01 | End: 2021-01-01

## 2021-01-01 RX ORDER — TOPOTECAN 1 MG/1
1 CAPSULE ORAL
Qty: 10 | Refills: 11 | Status: ACTIVE | COMMUNITY
Start: 2021-01-01 | End: 1900-01-01

## 2021-01-01 RX ORDER — DEXAMETHASONE 4 MG/1
4 TABLET ORAL DAILY
Qty: 30 | Refills: 0 | Status: DISCONTINUED | COMMUNITY
Start: 2021-01-01 | End: 2021-01-01

## 2021-01-01 RX ORDER — MEMANTINE HYDROCHLORIDE 10 MG/1
10 TABLET, FILM COATED ORAL TWICE DAILY
Qty: 180 | Refills: 0 | Status: ACTIVE | COMMUNITY
Start: 2021-01-01 | End: 1900-01-01

## 2021-01-01 RX ORDER — DIPHENOXYLATE HYDROCHLORIDE AND ATROPINE SULFATE 2.5; .025 MG/1; MG/1
2.5-0.025 TABLET ORAL
Qty: 30 | Refills: 0 | Status: ACTIVE | COMMUNITY
Start: 2021-01-01 | End: 1900-01-01

## 2021-01-01 RX ORDER — LORAZEPAM 0.5 MG/1
0.5 TABLET ORAL
Qty: 20 | Refills: 0 | Status: DISCONTINUED | COMMUNITY
Start: 2020-08-10 | End: 2021-01-01

## 2021-01-01 RX ORDER — LOSARTAN POTASSIUM 50 MG/1
50 TABLET, FILM COATED ORAL
Qty: 30 | Refills: 0 | Status: ACTIVE | COMMUNITY
Start: 2020-05-28

## 2021-01-01 RX ORDER — METHADONE HYDROCHLORIDE 10 MG/1
10 TABLET ORAL
Qty: 810 | Refills: 0 | Status: ACTIVE | COMMUNITY
Start: 1900-01-01 | End: 1900-01-01

## 2021-01-01 RX ORDER — LINACLOTIDE 72 UG/1
72 CAPSULE, GELATIN COATED ORAL
Qty: 30 | Refills: 0 | Status: ACTIVE | COMMUNITY
Start: 2021-01-01 | End: 1900-01-01

## 2021-01-01 RX ORDER — OMEPRAZOLE 20 MG/1
20 CAPSULE, DELAYED RELEASE ORAL DAILY
Qty: 30 | Refills: 0 | Status: ACTIVE | COMMUNITY
Start: 2020-10-20 | End: 1900-01-01

## 2021-01-01 RX ORDER — FILGRASTIM-AAFI 480 UG/.8ML
480 INJECTION, SOLUTION SUBCUTANEOUS
Qty: 5 | Refills: 5 | Status: DISCONTINUED | COMMUNITY
Start: 2020-11-12 | End: 2021-01-01

## 2021-01-01 RX ORDER — ONDANSETRON 4 MG/1
4 TABLET, ORALLY DISINTEGRATING ORAL
Qty: 30 | Refills: 3 | Status: DISCONTINUED | COMMUNITY
Start: 2020-09-16 | End: 2021-01-01

## 2021-01-01 RX ORDER — LINACLOTIDE 72 UG/1
72 CAPSULE, GELATIN COATED ORAL
Qty: 60 | Refills: 0 | Status: DISCONTINUED | COMMUNITY
Start: 2020-08-12 | End: 2021-01-01

## 2021-01-01 RX ORDER — MIRTAZAPINE 7.5 MG/1
7.5 TABLET, FILM COATED ORAL
Qty: 30 | Refills: 0 | Status: ACTIVE | COMMUNITY
Start: 2021-01-01 | End: 1900-01-01

## 2021-01-12 NOTE — CONSULT LETTER
[Dear  ___] : Dear  [unfilled], [Courtesy Letter:] : I had the pleasure of seeing your patient, [unfilled], in my office today. [Please see my note below.] : Please see my note below. [Consult Closing:] : Thank you very much for allowing me to participate in the care of this patient.  If you have any questions, please do not hesitate to contact me. [Sincerely,] : Sincerely, [DrSudhakar  ___] : Dr. GOLDMAN [FreeTextEntry3] : Javier Sanabria MD, MPH\par Attending Physician\par Hematology Oncology\par [facility] [place]\par [facility]\par

## 2021-01-12 NOTE — ASSESSMENT
[FreeTextEntry1] : #Small cell lung cancer - at least stage IIB\par PET/CT (6/20) No distant metastases\par 9/8/20 Brain MRI- No mets\par -Radiation finished 11/18/20\par \par #Treatment: cisplatin etoposide x 4 cycles (9/16/20 - 11/20/20) \par 12/22/20 C5 with Carboplatin/Etoposide -  Carbo AUC 4 and dose reduce etoposide 10%\par Here for C6\par Labs reviewed and discussed with patient.\par Anemia, leucopenia- chemotherapy induced agranulocytosis- although it has been 3 weeks since the last treatment\par Hold chemo today\par 1 unit PRBC\par Zarxio 480 x 1 today\par He has more zarxio at home PRN\par Obtain restaging PET/CT\par Plan 6 cycles in total if he can tolerate the last chemo\par \par LOWELL\par Likely from dehydration vs cisplatin\par Switched to carboplatin last treatment\par Monitor for now\par \par Peripheral neuropathy - 2/2 treatment - will closely monitor. ADLs not affected - improved\par \par Follow up in 1 week for yareli labs and 3 weeks for  C6\par CBC, CMP, magnesium, phosphorus, type and screen

## 2021-01-12 NOTE — HISTORY OF PRESENT ILLNESS
[de-identified] : Mr Potter is a very pleasant 65 years ex smoker (quit 6 months ago, 1 ppd x 40 years), seen today for further management of his small cell lung cancer\par \par He reports "flu like symptoms" starting in March 2020, saw his PCP who obtained a CXR which showed a lung nodule, subsequently had  CT chest revealed 2 RUL nodules (measuring 1.3 cm and 1.1 cm). He was sent to [facility] for biopsy, He reports that his procedure was postponed for multiple weeks, and eventually he was told that he need to see a thoracic surgeon\par He saw Dr Sarthak Fernández, who referred him to IR for image guided biopsy\par \par CT Chest 5/28/2020: Moderate to extensive centrilobular paraseptal emphysema predominantly within bilateral upper lobes. There are 2 adjacent nodular densities noted within the RUL measuring 1.3 cm and 1.1 cm. Evaluation of the rest of the lung fields demonstrates mild subsegmental atelectasis and scaring involving the lingula. There is a 0.2 cm peripheral nodule note in the RLL. Also, there is a 0.2 cm nodule noted involving the superior segment of the RLL. There is also a central nodule noted involving the EMBER which measures 0.3 cm in sized. There are no spiculated nodules. There is no consolidation. \par \par PET/CT 6/23/2020: Mild focal uptake in the 2 adjacent pulmonary nodules in the posterior RUL (max SUV 4.7). No additional FDG avid pulmonary nodules identified. Focally avid right hilar lymph node (max SUV 8.6). No abnormal uptake in the nonenlarged precarinal lymph node. Moderate sized hiatal hernia. No evidence of distant metastases\par \par s/p core biopsy of a right upper lobe lung mass (8/24/20)\par Pathology\par Small cell carcinoma. 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 He also reports that his house is from early 1900s and may have asbestos in the basement\par \par MRI brain (9/8/20)\par No evidence for intracranial metastatic disease.\par No acute infarction, acute intracranial hemorrhage, hydrocephalus or extra-axial fluid collection.\par Mild chronic microvascular ischemic changes.\par Mild opacification of the left mastoid air cells.\par \par  [de-identified] : He is seen today for C6 carboplatin etoposide (12/22/20)\par s/p C4 last week (11/18-11/20)\par On cisplatin etoposide (9/16/20 - present) \par s/p radiation (9/23/20 - 11/18/20). Held since 11/9/20 -11/16/20 for neutropenia\par \par He tolerated carboplatin and eto better compared to cisplatin eto\par For the past few days, he has started to feel tired\par \par Weight - 249 -> 243 -> 244 lbs ->242lbs -> 243 lbs -> 237 lbs ->238 ->.243 lbs ->239 lbs -> 231 lbs ->232 lbs ->228 lbs\par

## 2021-01-19 NOTE — PHYSICAL EXAM
[Normal] : oriented to person, place and time, the affect was normal, the mood was normal and not anxious [de-identified] : telehealth

## 2021-01-19 NOTE — HISTORY OF PRESENT ILLNESS
[Home] : at home, [unfilled] , at the time of the visit. [Verbal consent obtained from patient] : the patient, [unfilled] [FreeTextEntry1] : \par Mr Potter is a 65 year old male, who was diagnosed with limited stage small cell carcinoma, s/p concurrent chemoradiation and present today via telehealth for his post treatment evaluation s/p radiation therapy. \par \par He had CT chest (5/28/20) which revealed a few parenchymal nodules with the largest one located in the posterior RUL, measuring 1.3 cm and 1.1 cm.  PET/CT (6/23/20) demonstrated 2 adjacent pulmonary nodules in the RUL (SUV 4.7). FDG avid right hilar lymph node (max SUV 8.6). No evidence of distant metastases.  CT guided biopsy was performed (8/24/20) and pathology revealed small cell carcinoma. MRI Brain on 9/8 was negative for any evidence of metastatic disease. \par \par He received Cisplatin/Carboplatin with etoposide every 3 weeks, first cycle began on 9/16/20 and was given concurrently with radiation.  He completed radiation to the right lung on 11/11/20, to a total dose on 66 Gy. He had treatment breaks for neutropenia. He received cycle 5 of chemotherapy on 12/22/20 and cycle 6 was held on 1/12/21 for low Hgb/Hct. He reports he is scheduled for his follow-up PET/CT on 1/15/21. Mr. Potter presents today for his post treatment evaluation s/p radiation therapy.

## 2021-01-19 NOTE — ASSESSMENT
[Work-up necessary to assess local, regional or metastatic recurrence] : Work-up necessary to assess local, regional or metastatic recurrence [FreeTextEntry1] : Patient continues to receive chemotherapy, imaging needed for re-evaluation

## 2021-01-21 NOTE — CONSULT LETTER
[Dear  ___] : Dear  [unfilled], [Courtesy Letter:] : I had the pleasure of seeing your patient, [unfilled], in my office today. [Please see my note below.] : Please see my note below. [Consult Closing:] : Thank you very much for allowing me to participate in the care of this patient.  If you have any questions, please do not hesitate to contact me. [Sincerely,] : Sincerely, [DrSudhakar  ___] : Dr. GOLDMAN [FreeTextEntry3] : Javier Sanabria MD, MPH\par Attending Physician\par Hematology Oncology\par [facility] [place]\par [facility]\par

## 2021-01-21 NOTE — HISTORY OF PRESENT ILLNESS
[de-identified] : Mr Potter is a very pleasant 65 years ex smoker (quit 6 months ago, 1 ppd x 40 years), seen today for further management of his small cell lung cancer\par \par He reports "flu like symptoms" starting in March 2020, saw his PCP who obtained a CXR which showed a lung nodule, subsequently had  CT chest revealed 2 RUL nodules (measuring 1.3 cm and 1.1 cm). He was sent to [facility] for biopsy, He reports that his procedure was postponed for multiple weeks, and eventually he was told that he need to see a thoracic surgeon\par He saw Dr Sarthak Fernández, who referred him to IR for image guided biopsy\par \par CT Chest 5/28/2020: Moderate to extensive centrilobular paraseptal emphysema predominantly within bilateral upper lobes. There are 2 adjacent nodular densities noted within the RUL measuring 1.3 cm and 1.1 cm. Evaluation of the rest of the lung fields demonstrates mild subsegmental atelectasis and scaring involving the lingula. There is a 0.2 cm peripheral nodule note in the RLL. Also, there is a 0.2 cm nodule noted involving the superior segment of the RLL. There is also a central nodule noted involving the EMBER which measures 0.3 cm in sized. There are no spiculated nodules. There is no consolidation. \par \par PET/CT 6/23/2020: Mild focal uptake in the 2 adjacent pulmonary nodules in the posterior RUL (max SUV 4.7). No additional FDG avid pulmonary nodules identified. Focally avid right hilar lymph node (max SUV 8.6). No abnormal uptake in the nonenlarged precarinal lymph node. Moderate sized hiatal hernia. No evidence of distant metastases\par \par s/p core biopsy of a right upper lobe lung mass (8/24/20)\par Pathology\par Small cell carcinoma. 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 He also reports that his house is from early 1900s and may have asbestos in the basement\par \par MRI brain (9/8/20)\par No evidence for intracranial metastatic disease.\par No acute infarction, acute intracranial hemorrhage, hydrocephalus or extra-axial fluid collection.\par Mild chronic microvascular ischemic changes.\par Mild opacification of the left mastoid air cells.\par \par  [de-identified] : He is seen today for for follow up\par On carboplatin etoposide (12/22/20-present)\par s/p C4 last week (11/18-11/20)\par On cisplatin etoposide (9/16/20 - present) \par s/p radiation (9/23/20 - 11/18/20). Held since 11/9/20 -11/16/20 for neutropenia\par \par He missed his carboplatin and etoposide last week due to severe pancytopenia s/p PRBC transfusion and GCSF\par He feels better\par \par He had a restaging PET/CT (1/15/21)\par Since June 23, 2020,\par 1.	Resolved hypermetabolic right upper lobe cancer and hypermetabolic right hilar adenopathy.\par 2.	New probably infectious/inflammatory changes in the right lung, probably treatment related.\par 3.	Slightly interval increase of probably reactive bone marrow uptake as described\par 4.             New FDG-avid serial fractures in the left lateral 4th - 6th ribs, posttraumatic. Minimal heterogeneous avidity throughout the bone marrow, with a few more distinct foci without CT correlate, for example in T3 vertebral body, T12 vertebral body, and right ilium, SUV 3.2, probably noise.\par \par He reports slipping over his rug and fall few weeks ago. He injured his left ribs and back. Pain is gradually getting better\par \par \par Weight - 249 -> 243 -> 244 lbs ->242lbs -> 243 lbs -> 237 lbs ->238 ->.243 lbs ->239 lbs -> 231 lbs ->232 lbs ->228 lbs -> 229\par

## 2021-01-21 NOTE — ASSESSMENT
[FreeTextEntry1] : #Small cell lung cancer - at least stage IIB\par PET/CT (6/20) No distant metastases\par 9/8/20 Brain MRI- No mets\par -Radiation finished 11/18/20\par \par #Treatment: cisplatin etoposide x 4 cycles (9/16/20 - 11/20/20) \par 12/22/20 C5 with Carboplatin/Etoposide -  Carbo AUC 4 and dose reduce etoposide 10%\par Missed C6 last week due to pancytopenia\par Anemia, leucopenia- chemotherapy induced agranulocytosis- s/p 1 unit PRBC and Zarxio 480 \par Restaging PET/CT shows excellent response\par Bone uptake- post traumatic s/p mechanical fall\par Plan 6 cycles in total if he can tolerate the last chemo\par Last treatment scheduled in 2 weeks\par \par LOWELL\par Likely from dehydration vs cisplatin\par Switched to carboplatin last treatment\par Monitor for now\par Will need referral to nephrology is does not improve\par \par Peripheral neuropathy - 2/2 treatment - will closely monitor. ADLs not affected -\par \par Follow up in 2 weeks for  C6\par CBC, CMP, magnesium, phosphorus, type and screen

## 2021-01-29 NOTE — HISTORY OF PRESENT ILLNESS
[de-identified] : Mr Potter is a very pleasant 65 years ex smoker (quit 6 months ago, 1 ppd x 40 years), seen today for further management of his small cell lung cancer\par \par He reports "flu like symptoms" starting in March 2020, saw his PCP who obtained a CXR which showed a lung nodule, subsequently had  CT chest revealed 2 RUL nodules (measuring 1.3 cm and 1.1 cm). He was sent to [facility] for biopsy, He reports that his procedure was postponed for multiple weeks, and eventually he was told that he need to see a thoracic surgeon\par He saw Dr Sarthak Fernández, who referred him to IR for image guided biopsy\par \par CT Chest 5/28/2020: Moderate to extensive centrilobular paraseptal emphysema predominantly within bilateral upper lobes. There are 2 adjacent nodular densities noted within the RUL measuring 1.3 cm and 1.1 cm. Evaluation of the rest of the lung fields demonstrates mild subsegmental atelectasis and scaring involving the lingula. There is a 0.2 cm peripheral nodule note in the RLL. Also, there is a 0.2 cm nodule noted involving the superior segment of the RLL. There is also a central nodule noted involving the EMBER which measures 0.3 cm in sized. There are no spiculated nodules. There is no consolidation. \par \par PET/CT 6/23/2020: Mild focal uptake in the 2 adjacent pulmonary nodules in the posterior RUL (max SUV 4.7). No additional FDG avid pulmonary nodules identified. Focally avid right hilar lymph node (max SUV 8.6). No abnormal uptake in the nonenlarged precarinal lymph node. Moderate sized hiatal hernia. No evidence of distant metastases\par \par s/p core biopsy of a right upper lobe lung mass (8/24/20)\par Pathology\par Small cell carcinoma. 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 He also reports that his house is from early 1900s and may have asbestos in the basement\par \par MRI brain (9/8/20)\par No evidence for intracranial metastatic disease.\par No acute infarction, acute intracranial hemorrhage, hydrocephalus or extra-axial fluid collection.\par Mild chronic microvascular ischemic changes.\par Mild opacification of the left mastoid air cells.\par \par  [de-identified] : He is seen today for for follow up\par On carboplatin etoposide (12/22/20-present)\par s/p C4 last week (11/18-11/20)\par On cisplatin etoposide (9/16/20 - present) \par s/p radiation (9/23/20 - 11/18/20). Held since 11/9/20 -11/16/20 for neutropenia\par \par He missed his carboplatin and etoposide last week due to severe pancytopenia s/p PRBC transfusion and GCSF\par He feels better\par \par He had a restaging PET/CT (1/15/21)\par Since June 23, 2020,\par 1.	Resolved hypermetabolic right upper lobe cancer and hypermetabolic right hilar adenopathy.\par 2.	New probably infectious/inflammatory changes in the right lung, probably treatment related.\par 3.	Slightly interval increase of probably reactive bone marrow uptake as described\par 4.             New FDG-avid serial fractures in the left lateral 4th - 6th ribs, posttraumatic. Minimal heterogeneous avidity throughout the bone marrow, with a few more distinct foci without CT correlate, for example in T3 vertebral body, T12 vertebral body, and right ilium, SUV 3.2, probably noise.\par \par He reports slipping over his rug and fall few weeks ago. He injured his left ribs and back. Pain is gradually getting better\par \par \par Weight - 249 -> 243 -> 244 lbs ->242lbs -> 243 lbs -> 237 lbs ->238 ->.243 lbs ->239 lbs -> 231 lbs ->232 lbs ->228 lbs -> 229\par

## 2021-01-29 NOTE — CONSULT LETTER
[Dear  ___] : Dear  [unfilled], [Courtesy Letter:] : I had the pleasure of seeing your patient, [unfilled], in my office today. [Please see my note below.] : Please see my note below. [Consult Closing:] : Thank you very much for allowing me to participate in the care of this patient.  If you have any questions, please do not hesitate to contact me. [Sincerely,] : Sincerely, [FreeTextEntry3] : Javier Sanabria MD, MPH\par Attending Physician\par Hematology Oncology\par [facility] [place]\par [facility]\par  [DrSudhakar  ___] : Dr. GOLDMAN

## 2021-02-09 NOTE — HISTORY OF PRESENT ILLNESS
[de-identified] : Mr Potter is a very pleasant 65 years ex smoker (quit 6 months ago, 1 ppd x 40 years), seen today for further management of his small cell lung cancer\par \par He reports "flu like symptoms" starting in March 2020, saw his PCP who obtained a CXR which showed a lung nodule, subsequently had  CT chest revealed 2 RUL nodules (measuring 1.3 cm and 1.1 cm). He was sent to [facility] for biopsy, He reports that his procedure was postponed for multiple weeks, and eventually he was told that he need to see a thoracic surgeon\par He saw Dr Sarthak Fernández, who referred him to IR for image guided biopsy\par \par CT Chest 5/28/2020: Moderate to extensive centrilobular paraseptal emphysema predominantly within bilateral upper lobes. There are 2 adjacent nodular densities noted within the RUL measuring 1.3 cm and 1.1 cm. Evaluation of the rest of the lung fields demonstrates mild subsegmental atelectasis and scaring involving the lingula. There is a 0.2 cm peripheral nodule note in the RLL. Also, there is a 0.2 cm nodule noted involving the superior segment of the RLL. There is also a central nodule noted involving the EMBER which measures 0.3 cm in sized. There are no spiculated nodules. There is no consolidation. \par \par PET/CT 6/23/2020: Mild focal uptake in the 2 adjacent pulmonary nodules in the posterior RUL (max SUV 4.7). No additional FDG avid pulmonary nodules identified. Focally avid right hilar lymph node (max SUV 8.6). No abnormal uptake in the nonenlarged precarinal lymph node. Moderate sized hiatal hernia. No evidence of distant metastases\par \par s/p core biopsy of a right upper lobe lung mass (8/24/20)\par Pathology\par Small cell carcinoma. 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 He also reports that his house is from early 1900s and may have asbestos in the basement\par \par MRI brain (9/8/20)\par No evidence for intracranial metastatic disease.\par No acute infarction, acute intracranial hemorrhage, hydrocephalus or extra-axial fluid collection.\par Mild chronic microvascular ischemic changes.\par Mild opacification of the left mastoid air cells.\par \par \par He had a restaging PET/CT (1/15/21)\par Since June 23, 2020,\par 1.	Resolved hypermetabolic right upper lobe cancer and hypermetabolic right hilar adenopathy.\par 2.	New probably infectious/inflammatory changes in the right lung, probably treatment related.\par 3.	Slightly interval increase of probably reactive bone marrow uptake as described\par 4.             New FDG-avid serial fractures in the left lateral 4th - 6th ribs, posttraumatic. Minimal heterogeneous avidity throughout the bone marrow, with a few more distinct foci without CT correlate, for example in T3 vertebral body, T12 vertebral body, and right ilium, SUV 3.2, probably noise.\par  [de-identified] : He is seen today for for follow up for C6\par s/p C5 carboplatin etoposide (12/22/20)\par s/p C4 with Cisplatin Etoposide  (9/16/20 - 11/20/20)\par s/p radiation (9/23/20 - 11/18/20). Held since 11/9/20 -11/16/20 for neutropenia\par \par Patient last treatment was on 12/22/20, held two weeks ago due to pancytopenia. Patient reports fatigue improved without treatment. He's hesitant in receiving C6 but agreed to it if necessary. \par \par Denies n/v, fever, headaches, dizziness, chest pain/palpitation, rash, bleeding, SOB/BERNSTEIN. \par \par Weight - 249 -> 243 -> 244 lbs ->242lbs -> 243 lbs -> 237 lbs ->238 ->.243 lbs ->239 lbs -> 231 lbs ->232 lbs ->228 lbs -> 229 -> 225 lbs\par

## 2021-02-09 NOTE — ASSESSMENT
[FreeTextEntry1] : #Small cell lung cancer - at least stage IIB\par PET/CT (6/20) No distant metastases\par 9/8/20 Brain MRI- No mets\par -Radiation finished 11/18/20\par \par #Treatment: cisplatin etoposide x 4 cycles (9/16/20 - 11/20/20) \par 12/22/20 C5 with Carboplatin/Etoposide -  Carbo AUC 4 and dose reduce etoposide 10%\par Treatment held in btw C5 and C6 due to anemia and neutropenia - s/p PRBCs and Zarxio.\par 2/9/21 C6 with Carbo/Etoposide\par 1/15/21 Restaging PET/CT shows excellent response\par Bone uptake- post traumatic s/p mechanical fall\par Labs reviewed and discussed\par proceed with last treatment C6\par To follow up next week for tox check. \par to get Neulasta after treatment\par \par LOWELL = Cr stays around 2. \par Likely from dehydration vs cisplatin\par Switched to carboplatin last treatment\par Monitor for now\par Advised to see nephrologist. \par \par Peripheral neuropathy - 2/2 treatment - will closely monitor. ADLs not affected -\par \par d/w Dr. Sanabria \par Follow up in 1 week for Tox check.\par CBC, CMP, magnesium, phosphorus, type and screen

## 2021-02-09 NOTE — RESULTS/DATA
[FreeTextEntry1] : Labs and images reviewed and discussed\par 2/9/21 wbc 5.9 Hgb 9.1 hct 26.9 plts 171

## 2021-02-09 NOTE — CONSULT LETTER
[Dear  ___] : Dear  [unfilled], [Courtesy Letter:] : I had the pleasure of seeing your patient, [unfilled], in my office today. [Please see my note below.] : Please see my note below. [Consult Closing:] : Thank you very much for allowing me to participate in the care of this patient.  If you have any questions, please do not hesitate to contact me. [Sincerely,] : Sincerely, [DrSudhakar  ___] : Dr. GOLDMAN [FreeTextEntry3] : Javier Sanabria MD, MPH\par Attending Physician\par Hematology Oncology\par [facility] [place]\par [facility]\par

## 2021-02-19 NOTE — HISTORY OF PRESENT ILLNESS
[de-identified] : Mr Potter is a very pleasant 65 years ex smoker (quit 6 months ago, 1 ppd x 40 years), seen today for further management of his small cell lung cancer\par \par He reports "flu like symptoms" starting in March 2020, saw his PCP who obtained a CXR which showed a lung nodule, subsequently had  CT chest revealed 2 RUL nodules (measuring 1.3 cm and 1.1 cm). He was sent to [facility] for biopsy, He reports that his procedure was postponed for multiple weeks, and eventually he was told that he need to see a thoracic surgeon\par He saw Dr Sarthak Fernández, who referred him to IR for image guided biopsy\par \par CT Chest 5/28/2020: Moderate to extensive centrilobular paraseptal emphysema predominantly within bilateral upper lobes. There are 2 adjacent nodular densities noted within the RUL measuring 1.3 cm and 1.1 cm. Evaluation of the rest of the lung fields demonstrates mild subsegmental atelectasis and scaring involving the lingula. There is a 0.2 cm peripheral nodule note in the RLL. Also, there is a 0.2 cm nodule noted involving the superior segment of the RLL. There is also a central nodule noted involving the EMBER which measures 0.3 cm in sized. There are no spiculated nodules. There is no consolidation. \par \par PET/CT 6/23/2020: Mild focal uptake in the 2 adjacent pulmonary nodules in the posterior RUL (max SUV 4.7). No additional FDG avid pulmonary nodules identified. Focally avid right hilar lymph node (max SUV 8.6). No abnormal uptake in the nonenlarged precarinal lymph node. Moderate sized hiatal hernia. No evidence of distant metastases\par \par s/p core biopsy of a right upper lobe lung mass (8/24/20)\par Pathology\par Small cell carcinoma. 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 He also reports that his house is from early 1900s and may have asbestos in the basement\par \par MRI brain (9/8/20)\par No evidence for intracranial metastatic disease.\par No acute infarction, acute intracranial hemorrhage, hydrocephalus or extra-axial fluid collection.\par Mild chronic microvascular ischemic changes.\par Mild opacification of the left mastoid air cells.\par \par \par He had a restaging PET/CT (1/15/21)\par Since June 23, 2020,\par 1.	Resolved hypermetabolic right upper lobe cancer and hypermetabolic right hilar adenopathy.\par 2.	New probably infectious/inflammatory changes in the right lung, probably treatment related.\par 3.	Slightly interval increase of probably reactive bone marrow uptake as described\par 4.             New FDG-avid serial fractures in the left lateral 4th - 6th ribs, posttraumatic. Minimal heterogeneous avidity throughout the bone marrow, with a few more distinct foci without CT correlate, for example in T3 vertebral body, T12 vertebral body, and right ilium, SUV 3.2, probably noise.\par  [de-identified] : He is seen today for for follow up after C6\par s/p C5 carboplatin etoposide (12/22/20 - 2/9/21)\par s/p C4 with Cisplatin Etoposide  (9/16/20 - 11/20/20) with radiation (9/23/20 - 11/18/20). Held 11/9/20 -11/16/20 for neutropenia\par \par He feels tired and fatigued\par No mouth sores\par No fever\par \par Weight - 249 -> 243 -> 244 lbs ->242lbs -> 243 lbs -> 237 lbs ->238 ->.243 lbs ->239 lbs -> 231 lbs ->232 lbs ->228 lbs -> 229 -> 225 lbs -> 223 lbs\par

## 2021-02-19 NOTE — ASSESSMENT
[FreeTextEntry1] : #Small cell lung cancer - at least stage IIB\par PET/CT (6/20) No distant metastases\par 9/8/20 Brain MRI- No mets\par -Radiation finished 11/18/20\par s/p cisplatin etoposide x 4 cycles (9/16/20 - 11/20/20) with radiation followed by 2 cycles of carboplatin and etoposide (Carbo AUC 4 and dose reduce etoposide 10%)\par 1/15/21 Restaging PET/CT shows excellent response. Bone uptake- post traumatic s/p mechanical fall\par \par Seen today for yareli labs\par Labs reviewed, analyzed and discussed\par Chemotherapy induced agranulocytosis- No mouth sores. Feels tired and fatigued\par Received neulasta post chemo. Had bone pains from neulasta, better with tylenol\par He has completed his systemic therapy\par Refer to rad onc to discuss prophylactic cranial irradiation\par He is concerned about the cognitive side effects\par \par LOWELL = Cr stays around 2. \par Likely from dehydration vs cisplatin\par Switched to carboplatin\par Monitor for now\par Advised to see nephrologist. \par \par Peripheral neuropathy - 2/2 treatment - will closely monitor. ADLs not affected -\par \par Follow up in 1 week for Tox check.\par CBC, CMP,  type and screen

## 2021-02-19 NOTE — CONSULT LETTER
[FreeTextEntry3] : Javier Saanbria MD, MPH\par Attending Physician\par Hematology Oncology\par [facility] [place]\par [facility]\par

## 2021-02-25 NOTE — HISTORY OF PRESENT ILLNESS
[Home] : at home, [unfilled] , at the time of the visit. [Medical Office: ([facility])___] : at the medical office located in  [Verbal consent obtained from patient] : the patient, [unfilled] [FreeTextEntry1] : \par Mr Potter is a 65 year old male, who was diagnosed with limited stage small cell carcinoma, s/p concurrent chemoradiation and present today via telehealth for his routine follow-up s/p completion of radiation therapy and for consideration of prophylactic cranial irradiation. \par \par Mr. Potter had a CT chest (5/28/20) which revealed a few parenchymal nodules with the largest one located in the posterior RUL, measuring 1.3 cm and 1.1 cm.  PET/CT (6/23/20) demonstrated 2 adjacent pulmonary nodules in the RUL (SUV 4.7). FDG avid right hilar lymph node (max SUV 8.6). No evidence of distant metastases.  CT guided biopsy was performed (8/24/20) and pathology revealed small cell carcinoma. MRI Brain on 9/8 was negative for any evidence of metastatic disease. \par \par He received Cisplatin/Carboplatin with etoposide every 3 weeks, first cycle began on 9/16/20 and was given concurrently with radiation.  He completed radiation to the right lung on 11/11/20, to a total dose on 66 Gy. He had treatment breaks for neutropenia. He completed cycle 6 of chemotherapy approximately 2 weeks ago. He underwent follow-up PET/CT on 1/15/21 that demonstrated resolution of the RUL cancer and right hilar adenopathy.  New inflammatory/infectious changes in the right lung - treatment related and slight increase in reactive bone marrow uptake. \par \par Mr. Potter reports his appetite is poor and intake is reduced to shakes and one solid meal for dinner mostly. He has lost a significantly amount of weight over the duration of treatment but reports he has completely improved his diet. He has a follow-up with Dr. Sanabria tomorrow, 2/26/21 and reports he is scheduled for the COVID-19 vaccine on 3/15/21, if medically permitted.  Mr. Potter presents today for routine follow-up post radiation therapy and also to discuss prophylactic cranial irradiation.

## 2021-02-25 NOTE — PHYSICAL EXAM
[General Appearance - Alert] : alert [General Appearance - In No Acute Distress] : in no acute distress [Normal] : oriented to person, place and time, the affect was normal, the mood was normal and not anxious [de-identified] : telehealth

## 2021-02-26 PROBLEM — N28.9 RENAL INSUFFICIENCY: Status: ACTIVE | Noted: 2021-01-01

## 2021-02-26 PROBLEM — R42 DIZZINESS: Status: ACTIVE | Noted: 2020-11-02

## 2021-02-26 NOTE — CONSULT LETTER
[Dear  ___] : Dear  [unfilled], [Courtesy Letter:] : I had the pleasure of seeing your patient, [unfilled], in my office today. [Please see my note below.] : Please see my note below. [Consult Closing:] : Thank you very much for allowing me to participate in the care of this patient.  If you have any questions, please do not hesitate to contact me. [Sincerely,] : Sincerely, [DrSudhakar  ___] : Dr. GOLDMAN [FreeTextEntry3] : Javier Sanabria MD, MPH\par Attending Physician\par Hematology Oncology\par [facility] [place]\par [facility]\par

## 2021-02-26 NOTE — RESULTS/DATA
[FreeTextEntry1] : Labs reviewed, analyzed and discussed\par 2/26/21 wbc 3.0 Hgb 7.0 Hct 20.2 plts 38\par BUN/Cr - 27/2.1

## 2021-02-26 NOTE — HISTORY OF PRESENT ILLNESS
[de-identified] : Mr Potter is a very pleasant 65 years ex smoker (quit 6 months ago, 1 ppd x 40 years), seen today for further management of his small cell lung cancer\par \par He reports "flu like symptoms" starting in March 2020, saw his PCP who obtained a CXR which showed a lung nodule, subsequently had  CT chest revealed 2 RUL nodules (measuring 1.3 cm and 1.1 cm). He was sent to [facility] for biopsy, He reports that his procedure was postponed for multiple weeks, and eventually he was told that he need to see a thoracic surgeon\par He saw Dr Sarthak Fernández, who referred him to IR for image guided biopsy\par \par CT Chest 5/28/2020: Moderate to extensive centrilobular paraseptal emphysema predominantly within bilateral upper lobes. There are 2 adjacent nodular densities noted within the RUL measuring 1.3 cm and 1.1 cm. Evaluation of the rest of the lung fields demonstrates mild subsegmental atelectasis and scaring involving the lingula. There is a 0.2 cm peripheral nodule note in the RLL. Also, there is a 0.2 cm nodule noted involving the superior segment of the RLL. There is also a central nodule noted involving the EMBER which measures 0.3 cm in sized. There are no spiculated nodules. There is no consolidation. \par \par PET/CT 6/23/2020: Mild focal uptake in the 2 adjacent pulmonary nodules in the posterior RUL (max SUV 4.7). No additional FDG avid pulmonary nodules identified. Focally avid right hilar lymph node (max SUV 8.6). No abnormal uptake in the nonenlarged precarinal lymph node. Moderate sized hiatal hernia. No evidence of distant metastases\par \par s/p core biopsy of a right upper lobe lung mass (8/24/20)\par Pathology\par Small cell carcinoma. 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 He also reports that his house is from early 1900s and may have asbestos in the basement\par \par MRI brain (9/8/20)\par No evidence for intracranial metastatic disease.\par No acute infarction, acute intracranial hemorrhage, hydrocephalus or extra-axial fluid collection.\par Mild chronic microvascular ischemic changes.\par Mild opacification of the left mastoid air cells.\par \par \par He had a restaging PET/CT (1/15/21)\par Since June 23, 2020,\par 1.	Resolved hypermetabolic right upper lobe cancer and hypermetabolic right hilar adenopathy.\par 2.	New probably infectious/inflammatory changes in the right lung, probably treatment related.\par 3.	Slightly interval increase of probably reactive bone marrow uptake as described\par 4.             New FDG-avid serial fractures in the left lateral 4th - 6th ribs, posttraumatic. Minimal heterogeneous avidity throughout the bone marrow, with a few more distinct foci without CT correlate, for example in T3 vertebral body, T12 vertebral body, and right ilium, SUV 3.2, probably noise.\par  [de-identified] : He is seen today for for follow up after C6\par s/p C5 carboplatin etoposide (12/22/20 - 2/9/21)\par s/p C4 with Cisplatin Etoposide  (9/16/20 - 11/20/20) with radiation (9/23/20 - 11/18/20). Held 11/9/20 -11/16/20 for neutropenia\par \par Patient reports of being dizzy and fatigue mostly getting up from the lying or seated position, worse in the morning. He's scheduled for MRI of brain on 3/4/21 and unsure whether or not he'll go for prophylactic cranial radiation.\par \par Some BERNSTEIN but no SOB at rest, bleeding, n/v, fever, headaches. \par \par Weight - 249 -> 243 -> 244 lbs ->242lbs -> 243 lbs -> 237 lbs ->238 ->.243 lbs ->239 lbs -> 231 lbs ->232 lbs ->228 lbs -> 229 -> 225 lbs -> 223 lbs -> 227 lbs\par

## 2021-02-26 NOTE — PHYSICAL EXAM
[Restricted in physically strenuous activity but ambulatory and able to carry out work of a light or sedentary nature] : Status 1- Restricted in physically strenuous activity but ambulatory and able to carry out work of a light or sedentary nature, e.g., light house work, office work [Normal] : affect appropriate [de-identified] : +pale

## 2021-02-26 NOTE — ASSESSMENT
[FreeTextEntry1] : #Small cell lung cancer - at least stage IIB\par PET/CT (6/20) No distant metastases\par 9/8/20 Brain MRI- No mets\par -Radiation finished 11/18/20\par s/p cisplatin etoposide x 4 cycles (9/16/20 - 11/20/20) with radiation followed by 2 cycles of carboplatin and etoposide (Carbo AUC 4 and dose reduce etoposide 10%)\par 1/15/21 Restaging PET/CT shows excellent response. Bone uptake- post traumatic s/p mechanical fall\par \par Seen today for yareli labs\par Labs reviewed, analyzed and discussed\par Agranulocytosis improved - s/p Neulasta 2/19/21 treatment\par  completed his systemic therapyb 2/9/21\par Refer to rad onc to discuss prophylactic cranial irradiation - MRI brain scheduled 3/4/21\par \par Anemia - Hgb 7.0 with dizziness and fatigue - to get 1 unit PRBCs today.\par \par LOWELL = Cr stays around 2. \par Likely from dehydration vs cisplatin\par Switched to carboplatin\par Monitor for now\par Advised to see nephrologist. \par \par Peripheral neuropathy - 2/2 treatment - will closely monitor. ADLs not affected -\par \par Follow up in 1 week to go over scan.\par CBC, CMP,  type and screen

## 2021-03-02 PROBLEM — R68.83 CHILLS (WITHOUT FEVER): Status: ACTIVE | Noted: 2021-01-01

## 2021-03-02 NOTE — HISTORY OF PRESENT ILLNESS
comfortable appearance/cooperative [de-identified] : Mr Potter is a very pleasant 65 years ex smoker (quit 6 months ago, 1 ppd x 40 years), seen today for further management of his small cell lung cancer\par \par He reports "flu like symptoms" starting in March 2020, saw his PCP who obtained a CXR which showed a lung nodule, subsequently had  CT chest revealed 2 RUL nodules (measuring 1.3 cm and 1.1 cm). He was sent to [facility] for biopsy, He reports that his procedure was postponed for multiple weeks, and eventually he was told that he need to see a thoracic surgeon\par He saw Dr Sarthak Fernández, who referred him to IR for image guided biopsy\par \par CT Chest 5/28/2020: Moderate to extensive centrilobular paraseptal emphysema predominantly within bilateral upper lobes. There are 2 adjacent nodular densities noted within the RUL measuring 1.3 cm and 1.1 cm. Evaluation of the rest of the lung fields demonstrates mild subsegmental atelectasis and scaring involving the lingula. There is a 0.2 cm peripheral nodule note in the RLL. Also, there is a 0.2 cm nodule noted involving the superior segment of the RLL. There is also a central nodule noted involving the EMBER which measures 0.3 cm in sized. There are no spiculated nodules. There is no consolidation. \par \par PET/CT 6/23/2020: Mild focal uptake in the 2 adjacent pulmonary nodules in the posterior RUL (max SUV 4.7). No additional FDG avid pulmonary nodules identified. Focally avid right hilar lymph node (max SUV 8.6). No abnormal uptake in the nonenlarged precarinal lymph node. Moderate sized hiatal hernia. No evidence of distant metastases\par \par s/p core biopsy of a right upper lobe lung mass (8/24/20)\par Pathology\par Small cell carcinoma. 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 He also reports that his house is from early 1900s and may have asbestos in the basement\par \par MRI brain (9/8/20)\par No evidence for intracranial metastatic disease.\par No acute infarction, acute intracranial hemorrhage, hydrocephalus or extra-axial fluid collection.\par Mild chronic microvascular ischemic changes.\par Mild opacification of the left mastoid air cells.\par \par \par He had a restaging PET/CT (1/15/21)\par Since June 23, 2020,\par 1.	Resolved hypermetabolic right upper lobe cancer and hypermetabolic right hilar adenopathy.\par 2.	New probably infectious/inflammatory changes in the right lung, probably treatment related.\par 3.	Slightly interval increase of probably reactive bone marrow uptake as described\par 4.             New FDG-avid serial fractures in the left lateral 4th - 6th ribs, posttraumatic. Minimal heterogeneous avidity throughout the bone marrow, with a few more distinct foci without CT correlate, for example in T3 vertebral body, T12 vertebral body, and right ilium, SUV 3.2, probably noise.\par  [de-identified] : He is seen today for for follow up after C6\par s/p C5 carboplatin etoposide (12/22/20 - 2/9/21)\par s/p C4 with Cisplatin Etoposide  (9/16/20 - 11/20/20) with radiation (9/23/20 - 11/18/20). Held 11/9/20 -11/16/20 for neutropenia\par \par Patient has a follow up appointment in two days but here because of shaking chills in the last two days with weakness and fatigue, fatigue did improve a little after blood transfusion 2/26/21. BERNSTEIN stays the same. Denies SOB at rest, fever, cough, chest pain/palpitation,  symptoms. \par \par He also had one loose BM earlier this morning and yesterday - no abdominal pain.\par \par Weight - 249 -> 243 -> 244 lbs ->242lbs -> 243 lbs -> 237 lbs ->238 ->.243 lbs ->239 lbs -> 231 lbs ->232 lbs ->228 lbs -> 229 -> 225 lbs -> 223 lbs -> 227 lbs -> 224 lbs\par

## 2021-03-02 NOTE — CONSULT LETTER
[Dear  ___] : Dear  [unfilled], [Courtesy Letter:] : I had the pleasure of seeing your patient, [unfilled], in my office today. [Please see my note below.] : Please see my note below. [Consult Closing:] : Thank you very much for allowing me to participate in the care of this patient.  If you have any questions, please do not hesitate to contact me. [Sincerely,] : Sincerely, [DrSudhakar  ___] : Dr. GOLDMAN [FreeTextEntry3] : Javier Sanabria MD, MPH\par Attending Physician\par Hematology Oncology\par [facility] [place]\par [facility]\par

## 2021-03-02 NOTE — RESULTS/DATA
[FreeTextEntry1] : Labs reviewed, analyzed and discussed\par 3/2/21 wbc 4.1 Hgb 8.1 hct 23.6 plts 72

## 2021-03-02 NOTE — ASSESSMENT
[FreeTextEntry1] : #Small cell lung cancer - at least stage IIB\par PET/CT (6/20) No distant metastases\par 9/8/20 Brain MRI- No mets\par -Radiation finished 11/18/20\par s/p cisplatin etoposide x 4 cycles (9/16/20 - 11/20/20) with radiation followed by 2 cycles of carboplatin and etoposide (Carbo AUC 4 and dose reduce etoposide 10%)\par 1/15/21 Restaging PET/CT shows excellent response. Bone uptake- post traumatic s/p mechanical fall\par \par Seen today for yareli labs\par Labs reviewed, analyzed and discussed\par Agranulocytosis improved - s/p Neulasta 2/19/21 treatment\par  completed his systemic therapyb 2/9/21\par Refer to rad onc to discuss prophylactic cranial irradiation - MRI brain scheduled 3/4/21\par \par #chills - no fever, SOB, chest pain - Labs reviewed - patient to call if any new problems arise at home.\par  Covid test ordered.\par \par Anemia - Hgb 8.1 s/p 1 unit PRBCs 2/26/21. No dizziness or SOB.\par \par LOWELL = Cr stays around 2. \par Likely from dehydration vs cisplatin\par Switched to carboplatin\par Monitor for now\par Advised to see nephrologist. \par \par Peripheral neuropathy - 2/2 treatment - will closely monitor. ADLs not affected -\par \par Follow up in 2 days to go over scan with Dr. Sanabria \par CBC, CMP,  type and screen

## 2021-03-04 NOTE — HISTORY OF PRESENT ILLNESS
[de-identified] : Mr Potter is a very pleasant 65 years ex smoker (quit 6 months ago, 1 ppd x 40 years), seen today for further management of his small cell lung cancer\par \par He reports "flu like symptoms" starting in March 2020, saw his PCP who obtained a CXR which showed a lung nodule, subsequently had  CT chest revealed 2 RUL nodules (measuring 1.3 cm and 1.1 cm). He was sent to [facility] for biopsy, He reports that his procedure was postponed for multiple weeks, and eventually he was told that he need to see a thoracic surgeon\par He saw Dr Sarthak Fernández, who referred him to IR for image guided biopsy\par \par CT Chest 5/28/2020: Moderate to extensive centrilobular paraseptal emphysema predominantly within bilateral upper lobes. There are 2 adjacent nodular densities noted within the RUL measuring 1.3 cm and 1.1 cm. Evaluation of the rest of the lung fields demonstrates mild subsegmental atelectasis and scaring involving the lingula. There is a 0.2 cm peripheral nodule note in the RLL. Also, there is a 0.2 cm nodule noted involving the superior segment of the RLL. There is also a central nodule noted involving the EMBER which measures 0.3 cm in sized. There are no spiculated nodules. There is no consolidation. \par \par PET/CT 6/23/2020: Mild focal uptake in the 2 adjacent pulmonary nodules in the posterior RUL (max SUV 4.7). No additional FDG avid pulmonary nodules identified. Focally avid right hilar lymph node (max SUV 8.6). No abnormal uptake in the nonenlarged precarinal lymph node. Moderate sized hiatal hernia. No evidence of distant metastases\par \par s/p core biopsy of a right upper lobe lung mass (8/24/20)\par Pathology\par Small cell carcinoma. 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 He also reports that his house is from early 1900s and may have asbestos in the basement\par \par MRI brain (9/8/20)\par No evidence for intracranial metastatic disease.\par No acute infarction, acute intracranial hemorrhage, hydrocephalus or extra-axial fluid collection.\par Mild chronic microvascular ischemic changes.\par Mild opacification of the left mastoid air cells.\par \par \par He had a restaging PET/CT (1/15/21)\par Since June 23, 2020,\par 1.	Resolved hypermetabolic right upper lobe cancer and hypermetabolic right hilar adenopathy.\par 2.	New probably infectious/inflammatory changes in the right lung, probably treatment related.\par 3.	Slightly interval increase of probably reactive bone marrow uptake as described\par 4.             New FDG-avid serial fractures in the left lateral 4th - 6th ribs, posttraumatic. Minimal heterogeneous avidity throughout the bone marrow, with a few more distinct foci without CT correlate, for example in T3 vertebral body, T12 vertebral body, and right ilium, SUV 3.2, probably noise.\par  [de-identified] : He is seen today for for follow up \par s/p carboplatin etoposide x 6 (12/22/20 - 2/9/21)\par s/p C4 with Cisplatin Etoposide  (9/16/20 - 11/20/20) with radiation (9/23/20 - 11/18/20). Held 11/9/20 -11/16/20 for neutropenia\par \par He is gradually recovering\par No further episodes of chills. NO fever\par \par He is scheduled for first COVID vaccine 3/15/21\par \par Weight - 249 -> 243 -> 244 lbs ->242lbs -> 243 lbs -> 237 lbs ->238 ->.243 lbs ->239 lbs -> 231 lbs ->232 lbs ->228 lbs -> 229 -> 225 lbs -> 223 lbs -> 227 lbs -> 224 lbs -> 223 lbs\par

## 2021-03-04 NOTE — CONSULT LETTER
[Dear  ___] : Dear  [unfilled], [Courtesy Letter:] : I had the pleasure of seeing your patient, [unfilled], in my office today. [Please see my note below.] : Please see my note below. [Consult Closing:] : Thank you very much for allowing me to participate in the care of this patient.  If you have any questions, please do not hesitate to contact me. [Sincerely,] : Sincerely, [DrSudhakar  ___] : Dr. GOLDMAN [FreeTextEntry3] : Javier Sanabria MD, MPH\par Attending Physician\par Hematology Oncology\par [facility] [place]\par [facility]\par

## 2021-03-04 NOTE — ASSESSMENT
[FreeTextEntry1] : #Small cell lung cancer - at least stage IIB\par PET/CT (6/20) No distant metastases\par 9/8/20 Brain MRI- No mets\par -Radiation finished 11/18/20\par s/p cisplatin etoposide x 4 cycles (9/16/20 - 11/20/20) with radiation followed by 2 cycles of carboplatin and etoposide (Carbo AUC 4 and dose reduce etoposide 10%)\par 1/15/21 Restaging PET/CT shows excellent response. 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 No dizziness or SOB.\par \par LOWELL = Cr stays around 2. \par Likely from dehydration vs cisplatin\par Switched to carboplatin\par Monitor for now\par Advised to see nephrologist. \par \par Peripheral neuropathy - 2/2 treatment - will closely monitor. ADLs not affected -\par \par COVID vaccine\par Information given\par Advised to register on the [facility] website and get vaccine whenever she is eligible\par \par Follow up in 1 month\par CBC, CMP,  type and screen

## 2021-03-30 NOTE — ASSESSMENT
[FreeTextEntry1] : #Small cell lung cancer - at least stage IIB\par PET/CT (6/20) No distant metastases\par 9/8/20 Brain MRI- No mets\par -Radiation finished 11/18/20\par s/p cisplatin etoposide x 4 cycles (9/16/20 - 11/20/20) with radiation followed by 2 cycles of carboplatin and etoposide (Carbo AUC 4 and dose reduce etoposide 10%)\par 1/15/21 Restaging PET/CT shows excellent response. 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 Awaiting their final plan\par CT C/A/P in 3 months from the last scan\par \par Peripheral neuropathy - 2/2 treatment - will closely monitor. ADLs not affected -\par \par Follow up in 1 month\par CBC, CMP

## 2021-03-30 NOTE — HISTORY OF PRESENT ILLNESS
[de-identified] : Mr Potter is a very pleasant 65 years ex smoker (quit 6 months ago, 1 ppd x 40 years), seen today for further management of his small cell lung cancer\par \par He reports "flu like symptoms" starting in March 2020, saw his PCP who obtained a CXR which showed a lung nodule, subsequently had  CT chest revealed 2 RUL nodules (measuring 1.3 cm and 1.1 cm). He was sent to [facility] for biopsy, He reports that his procedure was postponed for multiple weeks, and eventually he was told that he need to see a thoracic surgeon\par He saw Dr Sarthak Fernández, who referred him to IR for image guided biopsy\par \par CT Chest 5/28/2020: Moderate to extensive centrilobular paraseptal emphysema predominantly within bilateral upper lobes. There are 2 adjacent nodular densities noted within the RUL measuring 1.3 cm and 1.1 cm. Evaluation of the rest of the lung fields demonstrates mild subsegmental atelectasis and scaring involving the lingula. There is a 0.2 cm peripheral nodule note in the RLL. Also, there is a 0.2 cm nodule noted involving the superior segment of the RLL. There is also a central nodule noted involving the EMBER which measures 0.3 cm in sized. There are no spiculated nodules. There is no consolidation. \par \par PET/CT 6/23/2020: Mild focal uptake in the 2 adjacent pulmonary nodules in the posterior RUL (max SUV 4.7). No additional FDG avid pulmonary nodules identified. Focally avid right hilar lymph node (max SUV 8.6). No abnormal uptake in the nonenlarged precarinal lymph node. Moderate sized hiatal hernia. No evidence of distant metastases\par \par s/p core biopsy of a right upper lobe lung mass (8/24/20)\par Pathology\par Small cell carcinoma. 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 He also reports that his house is from early 1900s and may have asbestos in the basement\par \par MRI brain (9/8/20)\par No evidence for intracranial metastatic disease.\par No acute infarction, acute intracranial hemorrhage, hydrocephalus or extra-axial fluid collection.\par Mild chronic microvascular ischemic changes.\par Mild opacification of the left mastoid air cells.\par \par \par He had a restaging PET/CT (1/15/21)\par Since June 23, 2020,\par 1.	Resolved hypermetabolic right upper lobe cancer and hypermetabolic right hilar adenopathy.\par 2.	New probably infectious/inflammatory changes in the right lung, probably treatment related.\par 3.	Slightly interval increase of probably reactive bone marrow uptake as described\par 4.             New FDG-avid serial fractures in the left lateral 4th - 6th ribs, posttraumatic. Minimal heterogeneous avidity throughout the bone marrow, with a few more distinct foci without CT correlate, for example in T3 vertebral body, T12 vertebral body, and right ilium, SUV 3.2, probably noise.\par  [de-identified] : He is seen today for for follow up \par s/p carboplatin etoposide x 6 (12/22/20 - 2/9/21)\par s/p C4 with Cisplatin Etoposide  (9/16/20 - 11/20/20) with radiation (9/23/20 - 11/18/20). Held 11/9/20 -11/16/20 for neutropenia\par \par He is gradually recovering\par No further episodes of chills. NO fever\par \par Weight - 249 -> 243 -> 244 lbs ->242lbs -> 243 lbs -> 237 lbs ->238 ->.243 lbs ->239 lbs -> 231 lbs ->232 lbs ->228 lbs -> 229 -> 225 lbs -> 223 lbs -> 227 lbs -> 224 lbs -> 223 lbs\par

## 2021-03-30 NOTE — CONSULT LETTER
[Dear  ___] : Dear  [unfilled], [Courtesy Letter:] : I had the pleasure of seeing your patient, [unfilled], in my office today. [Please see my note below.] : Please see my note below. [Consult Closing:] : Thank you very much for allowing me to participate in the care of this patient.  If you have any questions, please do not hesitate to contact me. [Sincerely,] : Sincerely, [DrSudhakar  ___] : Dr. GOLDMAN [FreeTextEntry3] : Javier Sanabria MD, MPH\par Attending Physician\par Hematology Oncology\par [facility] [place]\par [facility]\par

## 2021-04-27 PROBLEM — N17.9 AKI (ACUTE KIDNEY INJURY): Status: ACTIVE | Noted: 2020-11-10

## 2021-04-27 NOTE — ASSESSMENT
[FreeTextEntry1] : #Small cell lung cancer - at least stage IIB\par PET/CT (6/20) No distant metastases\par 9/8/20 Brain MRI- No mets\par -Radiation finished 11/18/20\par s/p cisplatin etoposide x 4 cycles (9/16/20 - 11/20/20) with radiation followed by 2 cycles of carboplatin and etoposide (Carbo AUC 4 and dose reduce etoposide 10%)\par 1/15/21 Restaging PET/CT shows excellent response. 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 \par clinically doing well.\par to have restage PET/Ct scan due to renal insufficiency.\par \par Feeling cold/chills - 2/2 to not eating  and anemic  \par Advised to increase PO intake. \par H/H stable - normal iron panel \par \par Anxiety - continue with Xanax tid \par \par Peripheral neuropathy - 2/2 treatment - will closely monitor. ADLs not affected -\par \par Follow up in 2 months for follow up \par CBC, CMP, CEA, and Iron panel

## 2021-04-27 NOTE — CONSULT LETTER
[Dear  ___] : Dear  [unfilled], [Courtesy Letter:] : I had the pleasure of seeing your patient, [unfilled], in my office today. [Please see my note below.] : Please see my note below. [Consult Closing:] : Thank you very much for allowing me to participate in the care of this patient.  If you have any questions, please do not hesitate to contact me. [Sincerely,] : Sincerely, [DrSudhakar  ___] : Dr. GOLDMAN [FreeTextEntry3] : Javier Sanabria MD, MPH\par Attending Physician\par Hematology Oncology\par [facility] [place]\par [facility]\par

## 2021-04-27 NOTE — RESULTS/DATA
[FreeTextEntry1] : Labs reviewed, analyzed and discussed\par 4/27/21 wbc 5.3 Hgb 9.5 hct 27.6 plts 139\par BUN/Cr - 25/2.0

## 2021-04-27 NOTE — HISTORY OF PRESENT ILLNESS
[de-identified] : Mr Potter is a very pleasant 65 years ex smoker (quit 6 months ago, 1 ppd x 40 years), seen today for further management of his small cell lung cancer\par \par He reports "flu like symptoms" starting in March 2020, saw his PCP who obtained a CXR which showed a lung nodule, subsequently had  CT chest revealed 2 RUL nodules (measuring 1.3 cm and 1.1 cm). He was sent to [facility] for biopsy, He reports that his procedure was postponed for multiple weeks, and eventually he was told that he need to see a thoracic surgeon\par He saw Dr Sarthak Fernández, who referred him to IR for image guided biopsy\par \par CT Chest 5/28/2020: Moderate to extensive centrilobular paraseptal emphysema predominantly within bilateral upper lobes. There are 2 adjacent nodular densities noted within the RUL measuring 1.3 cm and 1.1 cm. Evaluation of the rest of the lung fields demonstrates mild subsegmental atelectasis and scaring involving the lingula. There is a 0.2 cm peripheral nodule note in the RLL. Also, there is a 0.2 cm nodule noted involving the superior segment of the RLL. There is also a central nodule noted involving the EMBER which measures 0.3 cm in sized. There are no spiculated nodules. There is no consolidation. \par \par PET/CT 6/23/2020: Mild focal uptake in the 2 adjacent pulmonary nodules in the posterior RUL (max SUV 4.7). No additional FDG avid pulmonary nodules identified. Focally avid right hilar lymph node (max SUV 8.6). No abnormal uptake in the nonenlarged precarinal lymph node. Moderate sized hiatal hernia. No evidence of distant metastases\par \par s/p core biopsy of a right upper lobe lung mass (8/24/20)\par Pathology\par Small cell carcinoma. 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 He also reports that his house is from early 1900s and may have asbestos in the basement\par \par MRI brain (9/8/20)\par No evidence for intracranial metastatic disease.\par No acute infarction, acute intracranial hemorrhage, hydrocephalus or extra-axial fluid collection.\par Mild chronic microvascular ischemic changes.\par Mild opacification of the left mastoid air cells.\par \par \par He had a restaging PET/CT (1/15/21)\par Since June 23, 2020,\par 1.	Resolved hypermetabolic right upper lobe cancer and hypermetabolic right hilar adenopathy.\par 2.	New probably infectious/inflammatory changes in the right lung, probably treatment related.\par 3.	Slightly interval increase of probably reactive bone marrow uptake as described\par 4.             New FDG-avid serial fractures in the left lateral 4th - 6th ribs, posttraumatic. Minimal heterogeneous avidity throughout the bone marrow, with a few more distinct foci without CT correlate, for example in T3 vertebral body, T12 vertebral body, and right ilium, SUV 3.2, probably noise.\par  [de-identified] : He is seen today for for follow up \par s/p carboplatin etoposide x 6 (12/22/20 - 2/9/21)\par s/p C4 with Cisplatin Etoposide  (9/16/20 - 11/20/20) with radiation (9/23/20 - 11/18/20). Held 11/9/20 -11/16/20 for neutropenia\par \par Patient reports of feeling cold in the last few weeks with temp drops down to 95 and 96 temp. He is not eating as much as he used to, skipping lunches, lost about 10 lbs sine March 2021. He has yet started prophylactic cranial radiation with Dr. Hidalgo, scheduled for cap/mask mapping today. Denies n/v, fever, headaches, dizziness, chest pain/palpitation, rash, bleeding, SOB/BERNSTEIN. \par \par Weight - 249 -> 243 -> 244 lbs ->242lbs -> 243 lbs -> 237 lbs ->238 ->.243 lbs ->239 lbs -> 231 lbs ->232 lbs ->228 lbs -> 229 -> 225 lbs -> 223 lbs -> 227 lbs -> 224 lbs -> 223 lbs-> 213 lbs \par

## 2021-05-18 NOTE — DISEASE MANAGEMENT
[Clinical] : TNM Stage: c [IIB] : IIB [FreeTextEntry4] : Limited stage  [NTNM] : 1 [TTNM] : 1b [MTNM] : X [de-identified] : 750 cGy [de-identified] : 2500 cGy [de-identified] : whole brain

## 2021-05-18 NOTE — VITALS
[Maximal Pain Intensity: 2/10] : 2/10 [Least Pain Intensity: 0/10] : 0/10 [Pain Location: ___] : Pain Location: [unfilled] [OTC] : OTC [90: Able to carry normal activity; minor signs or symptoms of disease.] : 90: Able to carry normal activity; minor signs or symptoms of disease.

## 2021-05-18 NOTE — PHYSICAL EXAM
[General Appearance - Alert] : alert [General Appearance - In No Acute Distress] : in no acute distress [Normal] : oriented to person, place and time, the affect was normal, the mood was normal and not anxious

## 2021-05-18 NOTE — HISTORY OF PRESENT ILLNESS
[FreeTextEntry1] : Mr. Potter is status post fraction 3 / 10 of radiation to the whole brain for PCI.  He reports dizziness post treatment. B/P sitting 141/82 and standing 131/71. He reports his fluid intake is not adequate and his appetite is decreasing. He has noticed some weight loss. He offers complaints of left frontal headache, rating it a 2 / 10 and takes Tylenol for relief. Recent MRI Brain (5/17/21) demonstrated interval development of extensive enhancing supratentorial and infratentorial intracranial metastases when compared to the March 2021 exam signal which demonstrate extensive surrounding vasogenic edema and evidence of hemorrhagic components. He will be started on Decadron 4 MG daily. He has follow-up with Dr. Sanabria tomorrow and reports he is awaiting his recent PET/CT results.

## 2021-05-19 NOTE — CONSULT LETTER
[Dear  ___] : Dear  [unfilled], [Courtesy Letter:] : I had the pleasure of seeing your patient, [unfilled], in my office today. [Please see my note below.] : Please see my note below. [Consult Closing:] : Thank you very much for allowing me to participate in the care of this patient.  If you have any questions, please do not hesitate to contact me. [Sincerely,] : Sincerely, [DrSudhakar  ___] : Dr. GOLDMAN [FreeTextEntry3] : Javier Sanabria MD, MPH\par Attending Physician\par Hematology Oncology\par [facility] [place]\par [facility]\par

## 2021-05-19 NOTE — ASSESSMENT
[FreeTextEntry1] : Relapsed refractory SCLC\par Brain mets\par Liver mets\par Bone mets\par Adrenal mets\par Lung mets\par He has developed diffuse metastatic disease within 90 days of completing his platinum based chemotherapy suggestive of refractory disease\par Explained to the patient at length, that unfortunately this usually means a very grim prognosis with average survival 6 months or less \par Discussed about the treatment options including lurbinectidin and role of immunotherapy chemo combination across tumor types\par I have reviewed the risks, benefits and side effects of chemotherapy with the patient. All questions were answered to satisfaction. Patient agrees to pursue the planned chemotherapy.\par Plan\par Liver biopsy. Send for molecular testing\par Lurbinectidin + keytruda Q3W with neulasta\par Xgeva  for bone mets\par Refer to Dr Eric Keane (Palliative care)-> patient planning to quit his job and try to make most of his remaining life. He requested increase in the dose of his xanax to 2 mg TID PRN. He denies being suicidal or homicidal and will call me if he feels so.  His wife assures support along with his daughter who lives in [place]\par \par Small cell lung cancer - at least stage IIB\par PET/CT (6/20) No distant metastases\par 9/8/20 Brain MRI- No mets\par -Radiation finished 11/18/20\par s/p cisplatin etoposide x 4 cycles (9/16/20 - 11/20/20) with radiation followed by 2 cycles of carboplatin and etoposide (Carbo AUC 4 and dose reduce etoposide 10%)\par 1/15/21 Restaging PET/CT shows excellent response. Bone uptake- post traumatic s/p mechanical fall\par Chemotherapy induced agranulocytosis- counts gradually improving\par MRI brain (3/4/21)- DENIS\par \par Peripheral neuropathy - 2/2 treatment - will closely monitor. ADLs not affected -\par \par Follow up in 3 weeks to begin chemo\par CBC, CMP,

## 2021-05-19 NOTE — HISTORY OF PRESENT ILLNESS
[de-identified] : Mr Potter is a very pleasant 65 years ex smoker (quit 6 months ago, 1 ppd x 40 years), seen today for further management of his small cell lung cancer\par \par He reports "flu like symptoms" starting in March 2020, saw his PCP who obtained a CXR which showed a lung nodule, subsequently had  CT chest revealed 2 RUL nodules (measuring 1.3 cm and 1.1 cm). He was sent to [facility] for biopsy, He reports that his procedure was postponed for multiple weeks, and eventually he was told that he need to see a thoracic surgeon\par He saw Dr Sarthak Fernández, who referred him to IR for image guided biopsy\par \par CT Chest 5/28/2020: Moderate to extensive centrilobular paraseptal emphysema predominantly within bilateral upper lobes. There are 2 adjacent nodular densities noted within the RUL measuring 1.3 cm and 1.1 cm. Evaluation of the rest of the lung fields demonstrates mild subsegmental atelectasis and scaring involving the lingula. There is a 0.2 cm peripheral nodule note in the RLL. Also, there is a 0.2 cm nodule noted involving the superior segment of the RLL. There is also a central nodule noted involving the EMBER which measures 0.3 cm in sized. There are no spiculated nodules. There is no consolidation. \par \par PET/CT 6/23/2020: Mild focal uptake in the 2 adjacent pulmonary nodules in the posterior RUL (max SUV 4.7). No additional FDG avid pulmonary nodules identified. Focally avid right hilar lymph node (max SUV 8.6). No abnormal uptake in the nonenlarged precarinal lymph node. Moderate sized hiatal hernia. No evidence of distant metastases\par \par s/p core biopsy of a right upper lobe lung mass (8/24/20)\par Pathology\par Small cell carcinoma. 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 He also reports that his house is from early 1900s and may have asbestos in the basement\par \par MRI brain (9/8/20)\par No evidence for intracranial metastatic disease.\par No acute infarction, acute intracranial hemorrhage, hydrocephalus or extra-axial fluid collection.\par Mild chronic microvascular ischemic changes.\par Mild opacification of the left mastoid air cells.\par \par \par He had a restaging PET/CT (1/15/21)\par Since June 23, 2020,\par 1.	Resolved hypermetabolic right upper lobe cancer and hypermetabolic right hilar adenopathy.\par 2.	New probably infectious/inflammatory changes in the right lung, probably treatment related.\par 3.	Slightly interval increase of probably reactive bone marrow uptake as described\par 4.             New FDG-avid serial fractures in the left lateral 4th - 6th ribs, posttraumatic. Minimal heterogeneous avidity throughout the bone marrow, with a few more distinct foci without CT correlate, for example in T3 vertebral body, T12 vertebral body, and right ilium, SUV 3.2, probably noise.\par  [de-identified] : He is seen today for for follow up \par s/p carboplatin etoposide x 6 (12/22/20 - 2/9/21)\par s/p C4 with Cisplatin Etoposide  (9/16/20 - 11/20/20) with radiation (9/23/20 - 11/18/20). Held 11/9/20 -11/16/20 for neutropenia\par Accompanied by his wife\par \par He started radiaton 5/14/21-present)\par Had MRI brain which showed diffuse brain mets. He was started on dexamethasone and his dose was increased to palliative WBRT\par \par PET/CT (5/21) showed new liver mets, lung mets, adrenal mets, bone mets\par \par He co right hip pain likely from acetabular met\par \par Weight - 249 -> 243 -> 244 lbs ->242lbs -> 243 lbs -> 237 lbs ->238 ->.243 lbs ->239 lbs -> 231 lbs ->232 lbs ->228 lbs -> 229 -> 225 lbs -> 223 lbs -> 227 lbs -> 224 lbs -> 223 lbs-> 213 lbs -> 212 lbs \par

## 2021-05-21 NOTE — REASON FOR VISIT
[Post-Treatment Evaluation] : post-treatment evaluation for [Brain Metastasis] : brain metastasis [Lung Cancer] : lung cancer

## 2021-05-24 NOTE — HISTORY OF PRESENT ILLNESS
[FreeTextEntry1] : Mr. Potter is status post fraction 7 / 10 of radiation to the whole brain for PCI.  Recent MRI Brain (5/17/21) demonstrated interval development of extensive enhancing supratentorial and infratentorial intracranial metastases. Recent PET/CT demonstrated metastatic disease to the liver, lung, adrenal, and bones. He has CT SIM for right hip and will receive 5 fractions - 20 Gy. He reports he is moderately fatigued, after 4 - 5 hours he needs to rest. His appetite is fair at this time, weight remains at 216 lbs 12 oz this week. He has taken leave from work for the duration of treatment.

## 2021-05-24 NOTE — DISEASE MANAGEMENT
[Clinical] : TNM Stage: c [IIB] : IIB [FreeTextEntry4] : Limited stage  [TTNM] : 1b [NTNM] : 1 [MTNM] : X [de-identified] : 1750 cGy [de-identified] : 2500 cGy [de-identified] : whole brain

## 2021-05-24 NOTE — PHYSICAL EXAM
[General Appearance - Alert] : alert [General Appearance - In No Acute Distress] : in no acute distress [Normal] : normal skin color and pigmentation and no rash

## 2021-05-28 PROBLEM — R29.6 RECURRENT FALLS: Status: ACTIVE | Noted: 2021-01-01

## 2021-05-28 PROBLEM — S22.49XA RIB FRACTURES: Status: ACTIVE | Noted: 2021-01-01

## 2021-05-28 NOTE — PHYSICAL EXAM
[General Appearance - Alert] : alert [General Appearance - In No Acute Distress] : in no acute distress [Sclera] : the sclera and conjunctiva were normal [Normal Oral Mucosa] : normal oral mucosa [No Oral Pallor] : no oral pallor [Hearing Threshold Finger Rub Not [place]] : hearing was normal [No Oral Cyanosis] : no oral cyanosis [Examination Of The Oral Cavity] : the lips and gums were normal [Oropharynx] : The oropharynx was normal [Neck Appearance] : the appearance of the neck was normal [Respiration, Rhythm And Depth] : normal respiratory rhythm and effort [Exaggerated Use Of Accessory Muscles For Inspiration] : no accessory muscle use [Auscultation Breath Sounds / Voice Sounds] : lungs were clear to auscultation bilaterally [Heart Rate And Rhythm] : heart rate was normal and rhythm regular [Apical Impulse] : the apical impulse was normal [Heart Sounds] : normal S1 and S2 [Full Pulse] : the pedal pulses are present [Edema] : there was no peripheral edema [Bowel Sounds] : normal bowel sounds [Abdomen Soft] : soft [Abdomen Tenderness] : non-tender [Skin Color & Pigmentation] : normal skin color and pigmentation [Skin Turgor] : normal skin turgor [] : no rash [Cranial Nerves] : cranial nerves 2-12 were intact [Oriented To Time, Place, And Person] : oriented to person, place, and time [Impaired Insight] : insight and judgment were intact [Affect] : the affect was normal [FreeTextEntry1] : unsteady gait

## 2021-05-28 NOTE — DATA REVIEWED
[FreeTextEntry1] : \par \par labs and imaging reviewed\par \par MRI brain\par \par IMPRESSION:\par \par Interval development of extensive enhancing supratentorial and infratentorial intracranial metastases when compared to the March 2021 exam signal which demonstrate extensive surrounding vasogenic edema and evidence of hemorrhagic components.\par \par No acute infarction, large acute intraparenchymal hematoma, hydrocephalus or extra-axial fluid collection.\par Mild chronic microvascular ischemic changes.\par Mild opacification of the left mastoid air cells.

## 2021-05-28 NOTE — HISTORY OF PRESENT ILLNESS
[FreeTextEntry1] : pt here to establish care with palliative team accompanied by with his wife, and daughter \par \[facility] , pt is a war  ( [place]) \par \par reports struggled with opiates since war and has been on methadone ever since\par \par currently taking 250 mg daily, pt reports he does not have any pain currently  but is aware of "discomfort" in hip at area  also with occasional pain at area of left rib fractures\par pt with recent imaging demonstrating lesions in brain( supratentorial and infratentorial) intracranial,  liver, lung, adrenal and \par pt is currently undergoing whole brain radiation \par and for right hip radiation \par \par \par \par

## 2021-05-28 NOTE — ASSESSMENT
[Depression] : depression [Frailty-weight loss of >5%] : frailty-weight loss  [Fall precautions] : fall precautions [Social support] : social support [Living arrangements] : living arrangements [Pain Management] : pain management [Medication Management] : medication management [FreeTextEntry1] : pt is a pleasant 65 y/o F with recurrent metastatic SCC with mets to brain, liver, adrenal, and bone on WBRT and RT to right hip with history of HTN, opioid abuse on methadone program with PTSD and signs of depression and anxiety\par \par will discuss with pt methadone program--pt gets 140 mg from program and 110 separately taking 250 mg today daily\par pt would benefit from methadone 250 mg split in TID about 80- mg TID for pain management\par dexamethasone continue as directed by Oncology team\par continue current medications\par taking xanax--will give trial of remeron for sleep and  mood swings if no benefit will start sertraline as pt likely with PTSD due to  history \par constipation-- senna/colace\par fall precautions--pt has steps to navigate-- referral to PT for balance \par pt to contact me with information to methadone program\par \par f/u in 2 weeks or sooner as needed\par discussed with pt wife and daughter questions and concerns addressed. \par

## 2021-06-01 NOTE — PHYSICAL EXAM
[General Appearance - Alert] : alert [General Appearance - In No Acute Distress] : in no acute distress [Normal] : normal heart rate and rhythm, normal S1 and S2, and no murmurs present

## 2021-06-01 NOTE — REVIEW OF SYSTEMS
[Fatigue: Grade 2 - Fatigue not relieved by rest; limiting instrumental ADL] : Fatigue: Grade 2 - Fatigue not relieved by rest; limiting instrumental ADL [Cognitive Disturbance: Grade 0] : Cognitive Disturbance: Grade 0 [Concentration Impairment: Grade 0] : Concentration Impairment: Grade 0 [Dizziness: Grade 0] : Dizziness: Grade 0  [Headache: Grade 0] : Headache: Grade 0 [Lethargy: Grade 1 - Mild symptoms; reduced alertness and awareness] : Lethargy: Grade 1 - Mild symptoms; reduced alertness and awareness [Peripheral Motor Neuropathy: Grade 0] : Peripheral Motor Neuropathy: Grade 0 [Peripheral Sensory Neuropathy: Grade 0] : Peripheral Sensory Neuropathy: Grade 0 [Somnolence: Grade 0] : Somnolence: Grade 0 [Depression: Grade 1 - Mild depressive symptoms] : Depression: Grade 1 - Mild depressive symptoms [Insomnia: Grade 0] : Insomnia: Grade 0 [Dermatitis Radiation: Grade 0] : Dermatitis Radiation: Grade 0

## 2021-06-02 NOTE — HISTORY OF PRESENT ILLNESS
[FreeTextEntry1] : Mr. Potter is receiving radiation to the whole brain and right hip for metastatic small cell lung cancer. He offers complaints of generalized fatigue and feeling as though his "legs are heavy". He continues on 4 MG of Decadron and memantine has been increased to 10 MG in AM and 5 MG PM.  He reports right hip discomfort mostly when lying down and he is currently taking Tylenol. He will start on Lurbinectidin and Keytruda under the care of Dr. Sanabria. He is scheduled for biopsy of the liver today. He is also receiving palliative care by Dr. Keane. He reports depression and was started on Xanax to help with sleep.

## 2021-06-02 NOTE — DISEASE MANAGEMENT
[Clinical] : TNM Stage: c [IIB] : IIB [FreeTextEntry4] : Limited stage  [TTNM] : 1b [NTNM] : 1 [MTNM] : X [de-identified] : 2750 cGy / 2400 cGy [de-identified] : 3500 cGy / 2000  cGy [de-identified] : whole brain / right hip

## 2021-06-09 PROBLEM — Z71.89 DNR (DO NOT RESUSCITATE) DISCUSSION: Status: ACTIVE | Noted: 2021-01-01

## 2021-06-09 PROBLEM — Z78.9 DNI (DO NOT INTUBATE): Status: ACTIVE | Noted: 2021-01-01

## 2021-06-09 NOTE — CONSULT LETTER
[FreeTextEntry3] : Javier Sanabria MD, MPH\par Attending Physician\par Hematology Oncology\par [facility] [place]\par [facility]\par

## 2021-06-09 NOTE — ASSESSMENT
[FreeTextEntry1] : CODE STATUS: DNR/DNI\par \par Relapsed refractory SCLC\par Brain, Liver, bone, Adrenal, Lung mets\par He has developed diffuse metastatic disease within 90 days of completing his platinum based chemotherapy \par Liver biopsy- cw SCLC\par Foundation panel requested\par completed WBRT and right hip radiation\par \par Here to begin C1 lurbinectidin and keytruda (6/9/21)\par Labs reviewed, analyzed and discussed\par I have reviewed the risks, benefits and side effects of chemotherapy with the patient. All questions were answered to satisfaction. Patient agrees to pursue the planned chemotherapy.\par Proceed with the chemo\par Explained that there is no evidence to combine Lurbi with keytruda. However, we are trying to obtain the keytruda on a compassionate use basis\par Further treatments depending on the foundation1 panel\par Plan\par Lurbinectidin + keytruda Q3W with neulasta\par Xgeva  for bone mets\par \par Brain mets\par completed WBRT with Dr Hidalgo\par ON dexamethasone 4 mg QD\par \par Small cell lung cancer - at least stage IIB\par PET/CT (6/20) No distant metastases\par 9/8/20 Brain MRI- No mets\par -Radiation finished 11/18/20\par s/p cisplatin etoposide x 4 cycles (9/16/20 - 11/20/20) with radiation followed by 2 cycles of carboplatin and etoposide (Carbo AUC 4 and dose reduce etoposide 10%)\par 1/15/21 Restaging PET/CT shows excellent response. Bone uptake- post traumatic s/p mechanical fall\par Chemotherapy induced agranulocytosis- counts gradually improving\par MRI brain (3/4/21)- DENIS\par \par Peripheral neuropathy - 2/2 treatment - will closely monitor. ADLs not affected -\par \par Follow up in 3 weeks for C2 Lurbi, keytruda, xgeva\par CBC, CMP, CEA

## 2021-06-09 NOTE — CONSULT LETTER
[FreeTextEntry3] : Javier Sanabria MD, MPH\par Attending Physician\par Hematology Oncology\par [facility] [place]\par [facility]\par

## 2021-06-09 NOTE — HISTORY OF PRESENT ILLNESS
[de-identified] : Mr Potter is a very pleasant 65 years ex smoker (quit 6 months ago, 1 ppd x 40 years), seen today for further management of his small cell lung cancer\par \par He reports "flu like symptoms" starting in March 2020, saw his PCP who obtained a CXR which showed a lung nodule, subsequently had  CT chest revealed 2 RUL nodules (measuring 1.3 cm and 1.1 cm). He was sent to [facility] for biopsy, He reports that his procedure was postponed for multiple weeks, and eventually he was told that he need to see a thoracic surgeon\par He saw Dr Sarthak Fernández, who referred him to IR for image guided biopsy\par \par CT Chest 5/28/2020: Moderate to extensive centrilobular paraseptal emphysema predominantly within bilateral upper lobes. There are 2 adjacent nodular densities noted within the RUL measuring 1.3 cm and 1.1 cm. Evaluation of the rest of the lung fields demonstrates mild subsegmental atelectasis and scaring involving the lingula. There is a 0.2 cm peripheral nodule note in the RLL. Also, there is a 0.2 cm nodule noted involving the superior segment of the RLL. There is also a central nodule noted involving the EMBER which measures 0.3 cm in sized. There are no spiculated nodules. There is no consolidation. \par \par PET/CT 6/23/2020: Mild focal uptake in the 2 adjacent pulmonary nodules in the posterior RUL (max SUV 4.7). No additional FDG avid pulmonary nodules identified. Focally avid right hilar lymph node (max SUV 8.6). No abnormal uptake in the nonenlarged precarinal lymph node. Moderate sized hiatal hernia. No evidence of distant metastases\par \par s/p core biopsy of a right upper lobe lung mass (8/24/20)\par Pathology\par Small cell carcinoma. 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 He also reports that his house is from early 1900s and may have asbestos in the basement\par \par MRI brain (9/8/20)\par No evidence for intracranial metastatic disease.\par No acute infarction, acute intracranial hemorrhage, hydrocephalus or extra-axial fluid collection.\par Mild chronic microvascular ischemic changes.\par Mild opacification of the left mastoid air cells.\par \par \par He had a restaging PET/CT (1/15/21)\par Since June 23, 2020,\par 1.	Resolved hypermetabolic right upper lobe cancer and hypermetabolic right hilar adenopathy.\par 2.	New probably infectious/inflammatory changes in the right lung, probably treatment related.\par 3.	Slightly interval increase of probably reactive bone marrow uptake as described\par 4.             New FDG-avid serial fractures in the left lateral 4th - 6th ribs, posttraumatic. Minimal heterogeneous avidity throughout the bone marrow, with a few more distinct foci without CT correlate, for example in T3 vertebral body, T12 vertebral body, and right ilium, SUV 3.2, probably noise.\par \par Had MRI brain which showed diffuse brain mets. He was started on dexamethasone and his dose was increased to palliative WBRT\par \par PET/CT (5/21) showed new liver mets, lung mets, adrenal mets, bone mets\par \par He co right hip pain likely from acetabular met\par  [de-identified] : He is seen today for for follow up and C1 lurbinectidin keytruda (6/9/21-present)\par s/p carboplatin etoposide x 6 (12/22/20 - 2/9/21)\par s/p C4 with Cisplatin Etoposide  (9/16/20 - 11/20/20) with radiation (9/23/20 - 11/18/20). Held 11/9/20 -11/16/20 for neutropenia\par Completed radiation (5/14/21 - 6/3/21 )- he had WBRT and right hip\par Accompanied by his wife\par \par He feels better\par Reports that his "right eye feels thick"\par No change in the vision,\par He is on dexamethasone and memantine\par \par s/p liver biopsy with IR\par Path- SCLC\par \par \par Weight - 249 -> 243 -> 244 lbs ->242lbs -> 243 lbs -> 237 lbs ->238 ->.243 lbs ->239 lbs -> 231 lbs ->232 lbs ->228 lbs -> 229 -> 225 lbs -> 223 lbs -> 227 lbs -> 224 lbs -> 223 lbs-> 213 lbs -> 212 lbs  -> 211 lbs\par

## 2021-06-09 NOTE — HISTORY OF PRESENT ILLNESS
[de-identified] : Mr Potter is a very pleasant 65 years ex smoker (quit 6 months ago, 1 ppd x 40 years), seen today for further management of his small cell lung cancer\par \par He reports "flu like symptoms" starting in March 2020, saw his PCP who obtained a CXR which showed a lung nodule, subsequently had  CT chest revealed 2 RUL nodules (measuring 1.3 cm and 1.1 cm). He was sent to [facility] for biopsy, He reports that his procedure was postponed for multiple weeks, and eventually he was told that he need to see a thoracic surgeon\par He saw Dr Sarthak Fernández, who referred him to IR for image guided biopsy\par \par CT Chest 5/28/2020: Moderate to extensive centrilobular paraseptal emphysema predominantly within bilateral upper lobes. There are 2 adjacent nodular densities noted within the RUL measuring 1.3 cm and 1.1 cm. Evaluation of the rest of the lung fields demonstrates mild subsegmental atelectasis and scaring involving the lingula. There is a 0.2 cm peripheral nodule note in the RLL. Also, there is a 0.2 cm nodule noted involving the superior segment of the RLL. There is also a central nodule noted involving the EMBER which measures 0.3 cm in sized. There are no spiculated nodules. There is no consolidation. \par \par PET/CT 6/23/2020: Mild focal uptake in the 2 adjacent pulmonary nodules in the posterior RUL (max SUV 4.7). No additional FDG avid pulmonary nodules identified. Focally avid right hilar lymph node (max SUV 8.6). No abnormal uptake in the nonenlarged precarinal lymph node. Moderate sized hiatal hernia. No evidence of distant metastases\par \par s/p core biopsy of a right upper lobe lung mass (8/24/20)\par Pathology\par Small cell carcinoma. 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 He also reports that his house is from early 1900s and may have asbestos in the basement\par \par MRI brain (9/8/20)\par No evidence for intracranial metastatic disease.\par No acute infarction, acute intracranial hemorrhage, hydrocephalus or extra-axial fluid collection.\par Mild chronic microvascular ischemic changes.\par Mild opacification of the left mastoid air cells.\par \par \par He had a restaging PET/CT (1/15/21)\par Since June 23, 2020,\par 1.	Resolved hypermetabolic right upper lobe cancer and hypermetabolic right hilar adenopathy.\par 2.	New probably infectious/inflammatory changes in the right lung, probably treatment related.\par 3.	Slightly interval increase of probably reactive bone marrow uptake as described\par 4.             New FDG-avid serial fractures in the left lateral 4th - 6th ribs, posttraumatic. Minimal heterogeneous avidity throughout the bone marrow, with a few more distinct foci without CT correlate, for example in T3 vertebral body, T12 vertebral body, and right ilium, SUV 3.2, probably noise.\par \par Had MRI brain which showed diffuse brain mets. He was started on dexamethasone and his dose was increased to palliative WBRT\par \par PET/CT (5/21) showed new liver mets, lung mets, adrenal mets, bone mets\par \par He co right hip pain likely from acetabular met\par  [de-identified] : He is seen today for for follow up and C1 lurbinectidin keytruda (6/9/21-present)\par s/p carboplatin etoposide x 6 (12/22/20 - 2/9/21)\par s/p C4 with Cisplatin Etoposide  (9/16/20 - 11/20/20) with radiation (9/23/20 - 11/18/20). Held 11/9/20 -11/16/20 for neutropenia\par Completed radiation (5/14/21 - 6/3/21 )- he had WBRT and right hip\par Accompanied by his wife\par \par He feels better\par Reports that his "right eye feels thick"\par No change in the vision,\par He is on dexamethasone and memantine\par \par s/p liver biopsy with IR\par Path- SCLC\par \par \par Weight - 249 -> 243 -> 244 lbs ->242lbs -> 243 lbs -> 237 lbs ->238 ->.243 lbs ->239 lbs -> 231 lbs ->232 lbs ->228 lbs -> 229 -> 225 lbs -> 223 lbs -> 227 lbs -> 224 lbs -> 223 lbs-> 213 lbs -> 212 lbs  -> 211 lbs\par

## 2021-06-11 PROBLEM — Z79.891: Status: ACTIVE | Noted: 2021-01-01

## 2021-06-14 NOTE — ASSESSMENT
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, liver, adrenal and bone on WBRT and RT to right hip with history of HTN, opioid abuse on methadone program with PTSD and signs of depression and anxiety\par \par ECG reviewed QT less than 500 \par rx given for Methadone 80 mg TID\par continue Dexamethasone \par trial of remeron will continue to increase based on pt response \par PT for balance training \par extensive discussion held with pt methadone program FELIPE Renee [phone] ext [phone] nursing manager Jose Alfredo Blake [phone]\par pt was in good standing in program, agree with pt plan of care to move forward with methadone as pain regimen, discussed with pt alone, pt in agreement\par filled out form for handicapped parking \par medical marijuana certified today.\par \par RX sent will f/u in 2 weeks or sooner as needed\par

## 2021-06-14 NOTE — PHYSICAL EXAM
[General Appearance - Alert] : alert [General Appearance - In No Acute Distress] : in no acute distress [Sclera] : the sclera and conjunctiva were normal [Normal Oral Mucosa] : normal oral mucosa [No Oral Pallor] : no oral pallor [No Oral Cyanosis] : no oral cyanosis [Hearing Threshold Finger Rub Not [place]] : hearing was normal [Examination Of The Oral Cavity] : the lips and gums were normal [Oropharynx] : The oropharynx was normal [Neck Appearance] : the appearance of the neck was normal [Respiration, Rhythm And Depth] : normal respiratory rhythm and effort [Exaggerated Use Of Accessory Muscles For Inspiration] : no accessory muscle use [Auscultation Breath Sounds / Voice Sounds] : lungs were clear to auscultation bilaterally [Apical Impulse] : the apical impulse was normal [Heart Rate And Rhythm] : heart rate was normal and rhythm regular [Heart Sounds] : normal S1 and S2 [Full Pulse] : the pedal pulses are present [Edema] : there was no peripheral edema [Bowel Sounds] : normal bowel sounds [Abdomen Soft] : soft [Abdomen Tenderness] : non-tender [Skin Color & Pigmentation] : normal skin color and pigmentation [Skin Turgor] : normal skin turgor [] : no rash [Cranial Nerves] : cranial nerves 2-12 were intact [Oriented To Time, Place, And Person] : oriented to person, place, and time [Impaired Insight] : insight and judgment were intact [Affect] : the affect was normal [FreeTextEntry1] : unsteady gait

## 2021-06-16 PROBLEM — R91.8 LUNG MASS: Status: ACTIVE | Noted: 2020-08-05

## 2021-06-16 PROBLEM — D61.818 PANCYTOPENIA: Status: ACTIVE | Noted: 2020-11-10

## 2021-06-16 NOTE — ASSESSMENT
[FreeTextEntry1] : \par #Small cell lung cancer - at least stage IIB\par PET/CT (6/20) No distant metastases\par 9/8/20 Brain MRI- No mets\par -Radiation finished 11/18/20\par s/p cisplatin etoposide x 4 cycles (9/16/20 - 11/20/20) with radiation followed by 2 cycles of carboplatin and etoposide (Carbo AUC 4 and dose reduce etoposide 10%)\par 1/15/21 Restaging PET/CT shows excellent response. Bone uptake- post traumatic s/p mechanical fall\par Chemotherapy induced agranulocytosis- counts gradually improving\par MRI brain (3/4/21)- DENIS\par \par # now with Relapsed refractory SCLC - 5/2021\par Brain, Liver, bone, Adrenal, Lung mets\par He has developed diffuse metastatic disease within 90 days of completing his platinum based chemotherapy \par Liver biopsy- cw SCLC\par Foundation panel requested\par completed WBRT and right hip radiation in June 2021 with Dr. Hidalgo\par \par CURRENT TREATMENT -  C1 lurbinectidin and keytruda (6/9/21) - here for tox check\par Received Neulasta with treatment. \par Labs reviewed, analyzed and discussed\par Further treatments depending on the foundation1 panel\par Well tolerated \par Now with yareli pancytopenia - plts 59 - denies bleeding. Neutropenia fever, signs and symptoms of thrombocytopenia explained. \par on Decadron 4 mg x 3 weeks with radiation - advised to take Decadron 4 mg qod and for a week before d/c med.\par \par Plan\par Lurbinectidin + keytruda Q3W with neulasta\par Xgeva  for bone mets\par \par #Peripheral neuropathy and gait instability - 2/2 treatment - will closely monitor\par Advised to get a cane for stability - falls precaution advised. \par \par d/w Dr. Sanabria \par Follow up in 2 weeks for C2 Lurbi, keytruda, xgeva\par CBC, CMP, CEA

## 2021-06-16 NOTE — RESULTS/DATA
[FreeTextEntry1] : Labs reviewed, analyzed and discussed\par \par 6/16/21 wbc 3.4 Hgb 9.6 hct 28.1 plts 59 \par BUN/Cr 40/1.8\par \par s/p liver biopsy with IR\par Path- SCLC

## 2021-06-16 NOTE — CONSULT LETTER
[Dear  ___] : Dear  [unfilled], [Courtesy Letter:] : I had the pleasure of seeing your patient, [unfilled], in my office today. [Please see my note below.] : Please see my note below. [Consult Closing:] : Thank you very much for allowing me to participate in the care of this patient.  If you have any questions, please do not hesitate to contact me. [Sincerely,] : Sincerely, [DrSudhakar  ___] : Dr. GOLDMAN [FreeTextEntry3] : Javier Sanabria MD, MPH\par Attending Physician\par Hematology Oncology\par [facility] [place]\par [facility]\par

## 2021-06-16 NOTE — HISTORY OF PRESENT ILLNESS
[de-identified] : Mr Potter is a very pleasant 65 years ex smoker (quit 6 months ago, 1 ppd x 40 years), seen today for further management of his small cell lung cancer\par \par He reports "flu like symptoms" starting in March 2020, saw his PCP who obtained a CXR which showed a lung nodule, subsequently had  CT chest revealed 2 RUL nodules (measuring 1.3 cm and 1.1 cm). He was sent to [facility] for biopsy, He reports that his procedure was postponed for multiple weeks, and eventually he was told that he need to see a thoracic surgeon\par He saw Dr Sarthak Fernández, who referred him to IR for image guided biopsy\par \par CT Chest 5/28/2020: Moderate to extensive centrilobular paraseptal emphysema predominantly within bilateral upper lobes. There are 2 adjacent nodular densities noted within the RUL measuring 1.3 cm and 1.1 cm. Evaluation of the rest of the lung fields demonstrates mild subsegmental atelectasis and scaring involving the lingula. There is a 0.2 cm peripheral nodule note in the RLL. Also, there is a 0.2 cm nodule noted involving the superior segment of the RLL. There is also a central nodule noted involving the EMBER which measures 0.3 cm in sized. There are no spiculated nodules. There is no consolidation. \par \par PET/CT 6/23/2020: Mild focal uptake in the 2 adjacent pulmonary nodules in the posterior RUL (max SUV 4.7). No additional FDG avid pulmonary nodules identified. Focally avid right hilar lymph node (max SUV 8.6). No abnormal uptake in the nonenlarged precarinal lymph node. Moderate sized hiatal hernia. No evidence of distant metastases\par \par s/p core biopsy of a right upper lobe lung mass (8/24/20)\par Pathology\par Small cell carcinoma. 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 He also reports that his house is from early 1900s and may have asbestos in the basement\par \par MRI brain (9/8/20)\par No evidence for intracranial metastatic disease.\par No acute infarction, acute intracranial hemorrhage, hydrocephalus or extra-axial fluid collection.\par Mild chronic microvascular ischemic changes.\par Mild opacification of the left mastoid air cells.\par \par \par He had a restaging PET/CT (1/15/21)\par Since June 23, 2020,\par 1.	Resolved hypermetabolic right upper lobe cancer and hypermetabolic right hilar adenopathy.\par 2.	New probably infectious/inflammatory changes in the right lung, probably treatment related.\par 3.	Slightly interval increase of probably reactive bone marrow uptake as described\par 4.             New FDG-avid serial fractures in the left lateral 4th - 6th ribs, posttraumatic. Minimal heterogeneous avidity throughout the bone marrow, with a few more distinct foci without CT correlate, for example in T3 vertebral body, T12 vertebral body, and right ilium, SUV 3.2, probably noise.\par \par Had MRI brain which showed diffuse brain mets. He was started on dexamethasone and his dose was increased to palliative WBRT\par \par PET/CT (5/21) showed new liver mets, lung mets, adrenal mets, bone mets\par \par He co right hip pain likely from acetabular met\par  [de-identified] : He is seen today for for tox check\par s/p C1 lurbinectidin keytruda (6/9/21-present)\par s/p carboplatin etoposide x 6 (12/22/20 - 2/9/21)\par s/p C4 with Cisplatin Etoposide  (9/16/20 - 11/20/20) with radiation (9/23/20 - 11/18/20). Held 11/9/20 -11/16/20 for neutropenia\par Completed radiation (5/14/21 - 6/3/21 )- he had WBRT and right hip\par Accompanied by his daughter\par \par Patient with wobbly gait and feel unbalance at times, denies recent falls. He has been on Decadron 4 mg in the last three weeks.  He did fairly well after C1 treatment, 2 days of fatigue but eating better with weight gained. Has seen Dr. Keane with family members. \par \par Weight - 249 -> 243 -> 244 lbs ->242lbs -> 243 lbs -> 237 lbs ->238 ->.243 lbs ->239 lbs -> 231 lbs ->232 lbs ->228 lbs -> 229 -> 225 lbs -> 223 lbs -> 227 lbs -> 224 lbs -> 223 lbs-> 213 lbs -> 212 lbs  -> 211 lbs -> 214 -> 218 lbs \par

## 2021-06-24 NOTE — ASSESSMENT
[FreeTextEntry1] : \par #Small cell lung cancer - at least stage IIB\par PET/CT (6/20) No distant metastases\par 9/8/20 Brain MRI- No mets\par -Radiation finished 11/18/20\par s/p cisplatin etoposide x 4 cycles (9/16/20 - 11/20/20) with radiation followed by 2 cycles of carboplatin and etoposide (Carbo AUC 4 and dose reduce etoposide 10%)\par 1/15/21 Restaging PET/CT shows excellent response. Bone uptake- post traumatic s/p mechanical fall\par Chemotherapy induced agranulocytosis- counts gradually improving\par MRI brain (3/4/21)- DENIS\par \par # now with Relapsed refractory SCLC - 5/2021\par Brain, Liver, bone, Adrenal, Lung mets\par He has developed diffuse metastatic disease within 90 days of completing his platinum based chemotherapy \par Liver biopsy- cw SCLC\par Foundation panel requested\par completed WBRT and right hip radiation in June 2021 with Dr. Hidalgo\par \par CURRENT TREATMENT -  C1 lurbinectidin and keytruda (6/9/21) - here for tox check\par Received Neulasta with treatment. \par Labs reviewed, analyzed and discussed\par Further treatments depending on the foundation1 panel\par Well tolerated \par Now with yareli pancytopenia - plts 48 Hgb 8.3 (previoulsy 9.6) - denies bleeding. Neutropenia fever, signs and symptoms of thrombocytopenia explained. \par on Decadron 4 mg x 3 weeks with radiation -d/c last week\par \par Plan\par Lurbinectidin + keytruda Q3W with neulasta\par Xgeva  for bone mets\par \par #Fatigue and dehydration\par 1L NS given today\par Advised to increase fluid intake at home. \par \par #Peripheral neuropathy and gait instability - 2/2 treatment - will closely monitor\par Advised to get a cane for stability - falls precaution advised. \par \par d/w Dr. Sanabria \par Follow up in 1 weeks for C2 Lurbi, keytruda, xgeva\par CBC, CMP, CEA

## 2021-06-24 NOTE — CONSULT LETTER
[Dear  ___] : Dear  [unfilled], [Courtesy Letter:] : I had the pleasure of seeing your patient, [unfilled], in my office today. [Please see my note below.] : Please see my note below. [Consult Closing:] : Thank you very much for allowing me to participate in the care of this patient.  If you have any questions, please do not hesitate to contact me. [Sincerely,] : Sincerely, [DrSudhakar  ___] : Dr. GOLDMAN [FreeTextEntry3] : Javier Sanabira MD, MPH\par Attending Physician\par Hematology Oncology\par [facility] [place]\par [facility]\par

## 2021-06-24 NOTE — HISTORY OF PRESENT ILLNESS
[de-identified] : Mr Potter is a very pleasant 65 years ex smoker (quit 6 months ago, 1 ppd x 40 years), seen today for further management of his small cell lung cancer\par \par He reports "flu like symptoms" starting in March 2020, saw his PCP who obtained a CXR which showed a lung nodule, subsequently had  CT chest revealed 2 RUL nodules (measuring 1.3 cm and 1.1 cm). He was sent to [facility] for biopsy, He reports that his procedure was postponed for multiple weeks, and eventually he was told that he need to see a thoracic surgeon\par He saw Dr Sarthak Fernández, who referred him to IR for image guided biopsy\par \par CT Chest 5/28/2020: Moderate to extensive centrilobular paraseptal emphysema predominantly within bilateral upper lobes. There are 2 adjacent nodular densities noted within the RUL measuring 1.3 cm and 1.1 cm. Evaluation of the rest of the lung fields demonstrates mild subsegmental atelectasis and scaring involving the lingula. There is a 0.2 cm peripheral nodule note in the RLL. Also, there is a 0.2 cm nodule noted involving the superior segment of the RLL. There is also a central nodule noted involving the EMBER which measures 0.3 cm in sized. There are no spiculated nodules. There is no consolidation. \par \par PET/CT 6/23/2020: Mild focal uptake in the 2 adjacent pulmonary nodules in the posterior RUL (max SUV 4.7). No additional FDG avid pulmonary nodules identified. Focally avid right hilar lymph node (max SUV 8.6). No abnormal uptake in the nonenlarged precarinal lymph node. Moderate sized hiatal hernia. No evidence of distant metastases\par \par s/p core biopsy of a right upper lobe lung mass (8/24/20)\par Pathology\par Small cell carcinoma. 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 He also reports that his house is from early 1900s and may have asbestos in the basement\par \par MRI brain (9/8/20)\par No evidence for intracranial metastatic disease.\par No acute infarction, acute intracranial hemorrhage, hydrocephalus or extra-axial fluid collection.\par Mild chronic microvascular ischemic changes.\par Mild opacification of the left mastoid air cells.\par \par \par He had a restaging PET/CT (1/15/21)\par Since June 23, 2020,\par 1.	Resolved hypermetabolic right upper lobe cancer and hypermetabolic right hilar adenopathy.\par 2.	New probably infectious/inflammatory changes in the right lung, probably treatment related.\par 3.	Slightly interval increase of probably reactive bone marrow uptake as described\par 4.             New FDG-avid serial fractures in the left lateral 4th - 6th ribs, posttraumatic. Minimal heterogeneous avidity throughout the bone marrow, with a few more distinct foci without CT correlate, for example in T3 vertebral body, T12 vertebral body, and right ilium, SUV 3.2, probably noise.\par \par Had MRI brain which showed diffuse brain mets. He was started on dexamethasone and his dose was increased to palliative WBRT\par \par PET/CT (5/21) showed new liver mets, lung mets, adrenal mets, bone mets\par \par He co right hip pain likely from acetabular met\par  [de-identified] : He is seen today for for tox check\par s/p C1 lurbinectidin keytruda (6/9/21-present)\par s/p carboplatin etoposide x 6 (12/22/20 - 2/9/21)\par s/p C4 with Cisplatin Etoposide  (9/16/20 - 11/20/20) with radiation (9/23/20 - 11/18/20). Held 11/9/20 -11/16/20 for neutropenia\par Completed radiation (5/14/21 - 6/3/21 )- he had WBRT and right hip\par Accompanied by his daughter\par \par Patient came after the encouragement of his spouse for extreme fatigue and weakness in the last two days, sleeping more than usual. He is pushing for his PO intake and walking the dog daily.  He has been more wobbly, walking with the cane now. \par He d/c Decadron last week. \par Denies SOB/BERNSTEIN, rash, bleeding, pain. \par \par Weight - 249 -> 243 -> 244 lbs ->242lbs -> 243 lbs -> 237 lbs ->238 ->.243 lbs ->239 lbs -> 231 lbs ->232 lbs ->228 lbs -> 229 -> 225 lbs -> 223 lbs -> 227 lbs -> 224 lbs -> 223 lbs-> 213 lbs -> 212 lbs  -> 211 lbs -> 214 -> 218 lbs -> 213 lbs \par

## 2021-06-24 NOTE — RESULTS/DATA
[FreeTextEntry1] : Labs reviewed, analyzed and discussed\par \par 6/23/21 wbc 3.1 Hgb 8.3 hct 24.0 Plts 48\par \par s/p liver biopsy with IR\par Path- SCLC

## 2021-06-30 NOTE — RESULTS/DATA
[FreeTextEntry1] : Labs reviewed, analyzed and discussed\par \par s/p liver biopsy with IR\par Path- SCLC

## 2021-06-30 NOTE — HISTORY OF PRESENT ILLNESS
[de-identified] : Mr Potter is a very pleasant 65 years ex smoker (quit 6 months ago, 1 ppd x 40 years), seen today for further management of his small cell lung cancer\par \par He reports "flu like symptoms" starting in March 2020, saw his PCP who obtained a CXR which showed a lung nodule, subsequently had  CT chest revealed 2 RUL nodules (measuring 1.3 cm and 1.1 cm). He was sent to [facility] for biopsy, He reports that his procedure was postponed for multiple weeks, and eventually he was told that he need to see a thoracic surgeon\par He saw Dr Sarthak Fernández, who referred him to IR for image guided biopsy\par \par CT Chest 5/28/2020: Moderate to extensive centrilobular paraseptal emphysema predominantly within bilateral upper lobes. There are 2 adjacent nodular densities noted within the RUL measuring 1.3 cm and 1.1 cm. Evaluation of the rest of the lung fields demonstrates mild subsegmental atelectasis and scaring involving the lingula. There is a 0.2 cm peripheral nodule note in the RLL. Also, there is a 0.2 cm nodule noted involving the superior segment of the RLL. There is also a central nodule noted involving the EMBER which measures 0.3 cm in sized. There are no spiculated nodules. There is no consolidation. \par \par PET/CT 6/23/2020: Mild focal uptake in the 2 adjacent pulmonary nodules in the posterior RUL (max SUV 4.7). No additional FDG avid pulmonary nodules identified. Focally avid right hilar lymph node (max SUV 8.6). No abnormal uptake in the nonenlarged precarinal lymph node. Moderate sized hiatal hernia. No evidence of distant metastases\par \par s/p core biopsy of a right upper lobe lung mass (8/24/20)\par Pathology\par Small cell carcinoma. 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 He also reports that his house is from early 1900s and may have asbestos in the basement\par \par MRI brain (9/8/20)\par No evidence for intracranial metastatic disease.\par No acute infarction, acute intracranial hemorrhage, hydrocephalus or extra-axial fluid collection.\par Mild chronic microvascular ischemic changes.\par Mild opacification of the left mastoid air cells.\par \par \par He had a restaging PET/CT (1/15/21)\par Since June 23, 2020,\par 1.	Resolved hypermetabolic right upper lobe cancer and hypermetabolic right hilar adenopathy.\par 2.	New probably infectious/inflammatory changes in the right lung, probably treatment related.\par 3.	Slightly interval increase of probably reactive bone marrow uptake as described\par 4.             New FDG-avid serial fractures in the left lateral 4th - 6th ribs, posttraumatic. Minimal heterogeneous avidity throughout the bone marrow, with a few more distinct foci without CT correlate, for example in T3 vertebral body, T12 vertebral body, and right ilium, SUV 3.2, probably noise.\par \par Had MRI brain which showed diffuse brain mets. He was started on dexamethasone and his dose was increased to palliative WBRT\par \par PET/CT (5/21) showed new liver mets, lung mets, adrenal mets, bone mets\par \par He co right hip pain likely from acetabular met\par  [de-identified] : He is seen today for C2  lurbinectidin keytruda (6/9/21-present)\par s/p carboplatin etoposide x 6 (12/22/20 - 2/9/21)\par s/p C4 with Cisplatin Etoposide  (9/16/20 - 11/20/20) with radiation (9/23/20 - 11/18/20). Held 11/9/20 -11/16/20 for neutropenia\par Completed radiation (5/14/21 - 6/3/21 )- he had WBRT and right hip\par Accompanied by his spouse\par \par His spirits are gradually getting better\par Feels weak,and tired\par Resting for most part\par \par Weight - 249 -> 243 -> 244 lbs ->242lbs -> 243 lbs -> 237 lbs ->238 ->.243 lbs ->239 lbs -> 231 lbs ->232 lbs ->228 lbs -> 229 -> 225 lbs -> 223 lbs -> 227 lbs -> 224 lbs -> 223 lbs-> 213 lbs -> 212 lbs  -> 211 lbs -> 214 -> 218 lbs -> 200 lbs\par

## 2021-06-30 NOTE — CONSULT LETTER
[Dear  ___] : Dear  [unfilled], [Courtesy Letter:] : I had the pleasure of seeing your patient, [unfilled], in my office today. [Please see my note below.] : Please see my note below. [Consult Closing:] : Thank you very much for allowing me to participate in the care of this patient.  If you have any questions, please do not hesitate to contact me. [Sincerely,] : Sincerely, [DrSudhakar  ___] : Dr. GOLDMAN [FreeTextEntry3] : Javier Sanabria MD, MPH\par Attending Physician\par Hematology Oncology\par [facility]\par [facility]\par

## 2021-06-30 NOTE — ASSESSMENT
[FreeTextEntry1] : Small cell lung cancer - at least stage IIB\par PET/CT (6/20) No distant metastases\par 9/8/20 Brain MRI- No mets\par -Radiation finished 11/18/20\par s/p cisplatin etoposide x 4 cycles (9/16/20 - 11/20/20) with radiation followed by 2 cycles of carboplatin and etoposide (Carbo AUC 4 and dose reduce etoposide 10%)\par 1/15/21 Restaging PET/CT shows excellent response. 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 No targetable mutations\par completed WBRT and right hip radiation in June 2021 with Dr. Hidalgo\par \par CURRENT TREATMENT - lurbinectidin and keytruda (6/9/21 - present) \par Here for tox C2\par Labs reviewed, analyzed and discussed\par Platelets < 100K- Hold lurbinectidin\par Dose reduce next cycle\par PRoceed with keytruda\par Transaminases gradually improving\par Plan\par Lurbinectidin + keytruda Q3W with neulasta\par Xgeva  for bone mets\par \par Weight loss\par Small frequent meals encourgaed\par \par #Peripheral neuropathy and gait instability - 2/2 treatment - will closely monitor\par Advised to get a cane for stability - falls precaution advised. \par \par Follow up in 3 weeks for C2 Lurbi, keytruda, xgeva\par CBC, CMP, CEA

## 2021-07-16 NOTE — HISTORY OF PRESENT ILLNESS
[Home] : at home, [unfilled] , at the time of the visit. [Medical Office: ([facility])___] : at the medical office located in  [Spouse] : spouse [Verbal consent obtained from patient] : the patient, [unfilled] [FreeTextEntry1] : Mr Potter is a 65 year old male, who was diagnosed with limited stage small cell carcinoma in 8/2020, s/p concurrent chemoradiation and radiation therapy in 11/2020, and restaging imaging with CNS metastatic disease.  He presents today via telehealth for his routine follow-up s/p completion of radiation therapy.\par \par Mr. Potter had a CT chest (5/28/20) which revealed a few parenchymal nodules with the largest one located in the posterior RUL, measuring 1.3 cm and 1.1 cm.  PET/CT (6/23/20) demonstrated 2 adjacent pulmonary nodules in the RUL (SUV 4.7). FDG avid right hilar lymph node (max SUV 8.6). No evidence of distant metastases.  CT guided biopsy was performed (8/24/20) and pathology revealed small cell carcinoma. MRI Brain on 9/8 was negative for any evidence of metastatic disease. \par \par He received Cisplatin/Carboplatin with etoposide every 3 weeks, first cycle began on 9/16/20 and was given concurrently with radiation.  He completed radiation to the right lung on 11/11/20, to a total dose on 66 Gy. He had treatment breaks for neutropenia. He completed cycle 6 of chemotherapy approximately 2 weeks ago. He underwent follow-up PET/CT on 1/15/21 that demonstrated resolution of the RUL cancer and right hilar adenopathy.  New inflammatory/infectious changes in the right lung - treatment related and slight increase in reactive bone marrow uptake. \par \par MRI Brain (5/17/21) demonstrated interval development of extensive enhancing supratentorial and infratentorial intracranial metastases when compared to the March 2021 exam signal which demonstrate extensive surrounding vasogenic edema and evidence of hemorrhagic components. PET/CT demonstrated metastatic disease to the liver, lung, adrenal, and bones. He received right hip and whole brain palliative radiation to a total dose of 20/30 Gy, which he completed on 6/3/21. \par \par Mr. Potter reports mild headaches for which he takes Tylenol. He continues to take memantine BID. He started systemic therapy with Keytruda and Lurbinectidin q 3 week on 6/9/21. He report his appetite is significantly decreased and he has symtpoms of fatigue, generalized weakness. He is not very active and ambulates w/ cane. Mr. Potter and his wife present today via telephonic visit for his post treatment evaluation.

## 2021-07-16 NOTE — DISEASE MANAGEMENT
[Clinical] : TNM Stage: c [IIB] : IIB [FreeTextEntry4] : extensive stage   [TTNM] : 1b [NTNM] : 1 [MTNM] : X

## 2021-07-16 NOTE — PHYSICAL EXAM
[General Appearance - Alert] : alert [General Appearance - In No Acute Distress] : in no acute distress [Normal] : oriented to person, place and time, the affect was normal, the mood was normal and not anxious [de-identified] : telephonic visit

## 2021-07-22 NOTE — HISTORY OF PRESENT ILLNESS
[de-identified] : Mr Potter is a very pleasant 65 years ex smoker (quit 6 months ago, 1 ppd x 40 years), seen today for further management of his small cell lung cancer\par \par He reports "flu like symptoms" starting in March 2020, saw his PCP who obtained a CXR which showed a lung nodule, subsequently had  CT chest revealed 2 RUL nodules (measuring 1.3 cm and 1.1 cm). He was sent to [facility] for biopsy, He reports that his procedure was postponed for multiple weeks, and eventually he was told that he need to see a thoracic surgeon\par He saw Dr Sarthak Fernández, who referred him to IR for image guided biopsy\par \par CT Chest 5/28/2020: Moderate to extensive centrilobular paraseptal emphysema predominantly within bilateral upper lobes. There are 2 adjacent nodular densities noted within the RUL measuring 1.3 cm and 1.1 cm. Evaluation of the rest of the lung fields demonstrates mild subsegmental atelectasis and scaring involving the lingula. There is a 0.2 cm peripheral nodule note in the RLL. Also, there is a 0.2 cm nodule noted involving the superior segment of the RLL. There is also a central nodule noted involving the EMBER which measures 0.3 cm in sized. There are no spiculated nodules. There is no consolidation. \par \par PET/CT 6/23/2020: Mild focal uptake in the 2 adjacent pulmonary nodules in the posterior RUL (max SUV 4.7). No additional FDG avid pulmonary nodules identified. Focally avid right hilar lymph node (max SUV 8.6). No abnormal uptake in the nonenlarged precarinal lymph node. Moderate sized hiatal hernia. No evidence of distant metastases\par \par s/p core biopsy of a right upper lobe lung mass (8/24/20)\par Pathology\par Small cell carcinoma. 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 He also reports that his house is from early 1900s and may have asbestos in the basement\par \par MRI brain (9/8/20)\par No evidence for intracranial metastatic disease.\par No acute infarction, acute intracranial hemorrhage, hydrocephalus or extra-axial fluid collection.\par Mild chronic microvascular ischemic changes.\par Mild opacification of the left mastoid air cells.\par \par \par He had a restaging PET/CT (1/15/21)\par Since June 23, 2020,\par 1.	Resolved hypermetabolic right upper lobe cancer and hypermetabolic right hilar adenopathy.\par 2.	New probably infectious/inflammatory changes in the right lung, probably treatment related.\par 3.	Slightly interval increase of probably reactive bone marrow uptake as described\par 4.             New FDG-avid serial fractures in the left lateral 4th - 6th ribs, posttraumatic. Minimal heterogeneous avidity throughout the bone marrow, with a few more distinct foci without CT correlate, for example in T3 vertebral body, T12 vertebral body, and right ilium, SUV 3.2, probably noise.\par \par Had MRI brain which showed diffuse brain mets. He was started on dexamethasone and his dose was increased to palliative WBRT\par \par PET/CT (5/21) showed new liver mets, lung mets, adrenal mets, bone mets\par \par He co right hip pain likely from acetabular met\par  [de-identified] : He is seen today for C3  lurbinectidin keytruda (6/9/21-present) - Lurbinectidin was held on C2 due to thrombocytopenia \par s/p carboplatin etoposide x 6 (12/22/20 - 2/9/21)\par s/p C4 with Cisplatin Etoposide  (9/16/20 - 11/20/20) with radiation (9/23/20 - 11/18/20). Held 11/9/20 -11/16/20 for neutropenia\par Completed radiation (5/14/21 - 6/3/21 )- he had WBRT and right hip\par Accompanied by his spouse\par \par Patient was feeling better with holding Lurbinectidin last treatment but his energy decreased in the last few days, more fatigue and gait unsteady - walks with cane. \par \par Weight - 249 -> 243 -> 244 lbs ->242lbs -> 243 lbs -> 237 lbs ->238 ->.243 lbs ->239 lbs -> 231 lbs ->232 lbs ->228 lbs -> 229 -> 225 lbs -> 223 lbs -> 227 lbs -> 224 lbs -> 223 lbs-> 213 lbs -> 212 lbs  -> 211 lbs -> 214 -> 218 lbs -> 200 lbs -> 206 lbs \par

## 2021-07-22 NOTE — ASSESSMENT
[FreeTextEntry1] : Small cell lung cancer - at least stage IIB\par PET/CT (6/20) No distant metastases\par 9/8/20 Brain MRI- No mets\par -Radiation finished 11/18/20\par s/p cisplatin etoposide x 4 cycles (9/16/20 - 11/20/20) with radiation followed by 2 cycles of carboplatin and etoposide (Carbo AUC 4 and dose reduce etoposide 10%)\par 1/15/21 Restaging PET/CT shows excellent response. 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 No targetable mutations\par completed WBRT and right hip radiation in June 2021 with Dr. Hidalgo\par \par CURRENT TREATMENT - lurbinectidin and keytruda (6/9/21 - present) \par C2 - Lurbinectidine was held due to thrombocytopenia plts < 100\par 7/22/21 - C3 - reducing Lurbinectidin by 15% due to counts. \par Labs reviewed, analyzed and discussed\par Transaminases gradually improving\par Plan\par Lurbinectidin + keytruda Q3W with neulasta\par Xgeva  for bone mets - will start next treatment. \par \par #Anemia - Hgb 7.8 with fatigue - 1 unit PRBCs given today\par iron sat 17% and Ferritin 1000\par \par #Weight loss\par Small frequent meals encourgaed\par \par #Peripheral neuropathy and gait instability - 2/2 treatment - will closely monitor\par Advised to get a cane for stability - falls precaution advised. \par \par Follow up in 3 weeks for C4 Lurbi, keytruda, xgeva\par CBC, CMP, CEA

## 2021-07-22 NOTE — CONSULT LETTER
[Dear  ___] : Dear  [unfilled], [Courtesy Letter:] : I had the pleasure of seeing your patient, [unfilled], in my office today. [Please see my note below.] : Please see my note below. [Consult Closing:] : Thank you very much for allowing me to participate in the care of this patient.  If you have any questions, please do not hesitate to contact me. [Sincerely,] : Sincerely, [DrSudhakar  ___] : Dr. GOLDMAN [FreeTextEntry3] : Javier Sanabria MD, MPH\par Attending Physician\par Hematology Oncology\par [facility] [place]\par [facility]\par

## 2021-07-22 NOTE — RESULTS/DATA
[FreeTextEntry1] : Labs reviewed, analyzed and discussed\par \par 7/21/21 wbc 6.6 Hgb 7.8 hct 23.6 ptls 218 \par \par s/p liver biopsy with IR\par Path- SCLC

## 2021-08-11 PROBLEM — B37.0 THRUSH: Status: ACTIVE | Noted: 2021-01-01

## 2021-08-12 NOTE — ASSESSMENT
[FreeTextEntry1] : Small cell lung cancer - at least stage IIB\par PET/CT (6/20) No distant metastases\par 9/8/20 Brain MRI- No mets\par -Radiation finished 11/18/20\par s/p cisplatin etoposide x 4 cycles (9/16/20 - 11/20/20) with radiation followed by 2 cycles of carboplatin and etoposide (Carbo AUC 4 and dose reduce etoposide 10%)\par 1/15/21 Restaging PET/CT shows excellent response. 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 No targetable mutations\par completed WBRT and right hip radiation in June 2021 with Dr. Hidalgo\par \par CURRENT TREATMENT - C4 lurbinectidin and keytruda (6/9/21 - present) \par C2 - Lurbinectidine was held due to thrombocytopenia plts < 100\par 7/22/21 - C3 - reducing Lurbinectidin by 15% due to counts. \par Labs reviewed, analyzed and discussed\par Transaminases gradually improving\par Restaging scans after 6 cycles\par MRI brain shows excellent response\par Plan\par Lurbinectidin + keytruda Q3W with neulasta\par Xgeva  for bone mets - will start next treatment. \par \par Difficulty swallowing\par Thrush\par Diflucan 100 mg QD x 3 days\par Dose reduce methadone to half while on diflucan\par \par #Weight loss\par Small frequent meals encourgaed\par \par #Peripheral neuropathy and gait instability - 2/2 treatment - will closely monitor\par Advised to get a cane for stability - falls precaution advised. \par \par Follow up in 3 weeks for C5 Lurbi, keytruda\par CBC, CMP, CEA

## 2021-08-12 NOTE — RESULTS/DATA
[FreeTextEntry1] : Labs reviewed, analyzed and discussed\par \par MRI brain\par Marked interval resolution/decrease in size/enhancement of previously seen supratentorial and\par infratentorial intracranial metastases when compared to the May 2021 exam with most prominent lesion\par being a subcentimeter mildly enhancing left cerebellar lesion with evidence of chronic hemorrhage.\par The majority of previously described enhancing lesions on the May 2021 exam are no longer\par visualized.\par 
Admitted

## 2021-08-12 NOTE — PHYSICAL EXAM
[Ambulatory and capable of all self care but unable to carry out any work activities] : Status 2- Ambulatory and capable of all self care but unable to carry out any work activities. Up and about more than 50% of waking hours [Normal] : supple without JVD, no thyromegaly or masses appreciated [de-identified] : Thrush

## 2021-08-12 NOTE — CONSULT LETTER
[Dear  ___] : Dear  [unfilled], [Courtesy Letter:] : I had the pleasure of seeing your patient, [unfilled], in my office today. [Please see my note below.] : Please see my note below. [Consult Closing:] : Thank you very much for allowing me to participate in the care of this patient.  If you have any questions, please do not hesitate to contact me. [Sincerely,] : Sincerely, [DrSudhakar  ___] : Dr. GOLDMAN [FreeTextEntry3] : Javier Sanabria MD, MPH\par Attending Physician\par Hematology Oncology\par [facility] [place]\par [facility]\par

## 2021-08-12 NOTE — HISTORY OF PRESENT ILLNESS
[de-identified] : Mr Potter is a very pleasant 65 years ex smoker (quit 6 months ago, 1 ppd x 40 years), seen today for further management of his small cell lung cancer\par \par He reports "flu like symptoms" starting in March 2020, saw his PCP who obtained a CXR which showed a lung nodule, subsequently had  CT chest revealed 2 RUL nodules (measuring 1.3 cm and 1.1 cm). He was sent to [facility] for biopsy, He reports that his procedure was postponed for multiple weeks, and eventually he was told that he need to see a thoracic surgeon\par He saw Dr Sarthak Fernández, who referred him to IR for image guided biopsy\par \par CT Chest 5/28/2020: Moderate to extensive centrilobular paraseptal emphysema predominantly within bilateral upper lobes. There are 2 adjacent nodular densities noted within the RUL measuring 1.3 cm and 1.1 cm. Evaluation of the rest of the lung fields demonstrates mild subsegmental atelectasis and scaring involving the lingula. There is a 0.2 cm peripheral nodule note in the RLL. Also, there is a 0.2 cm nodule noted involving the superior segment of the RLL. There is also a central nodule noted involving the EMBER which measures 0.3 cm in sized. There are no spiculated nodules. There is no consolidation. \par \par PET/CT 6/23/2020: Mild focal uptake in the 2 adjacent pulmonary nodules in the posterior RUL (max SUV 4.7). No additional FDG avid pulmonary nodules identified. Focally avid right hilar lymph node (max SUV 8.6). No abnormal uptake in the nonenlarged precarinal lymph node. Moderate sized hiatal hernia. No evidence of distant metastases\par \par s/p core biopsy of a right upper lobe lung mass (8/24/20)\par Pathology\par Small cell carcinoma. 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 He also reports that his house is from early 1900s and may have asbestos in the basement\par \par MRI brain (9/8/20)\par No evidence for intracranial metastatic disease.\par No acute infarction, acute intracranial hemorrhage, hydrocephalus or extra-axial fluid collection.\par Mild chronic microvascular ischemic changes.\par Mild opacification of the left mastoid air cells.\par \par \par He had a restaging PET/CT (1/15/21)\par Since June 23, 2020,\par 1.	Resolved hypermetabolic right upper lobe cancer and hypermetabolic right hilar adenopathy.\par 2.	New probably infectious/inflammatory changes in the right lung, probably treatment related.\par 3.	Slightly interval increase of probably reactive bone marrow uptake as described\par 4.             New FDG-avid serial fractures in the left lateral 4th - 6th ribs, posttraumatic. Minimal heterogeneous avidity throughout the bone marrow, with a few more distinct foci without CT correlate, for example in T3 vertebral body, T12 vertebral body, and right ilium, SUV 3.2, probably noise.\par \par Had MRI brain which showed diffuse brain mets. He was started on dexamethasone and his dose was increased to palliative WBRT\par \par PET/CT (5/21) showed new liver mets, lung mets, adrenal mets, bone mets\par \par He co right hip pain likely from acetabular met\par \par s/p liver biopsy with IR\par Path- SCLC [de-identified] : He is seen today for C4  lurbinectidin keytruda (6/9/21-present)\par Lurbinectidin was held on C2 due to thrombocytopenia \par s/p carboplatin etoposide x 6 (12/22/20 - 2/9/21)\par s/p C4 with Cisplatin Etoposide  (9/16/20 - 11/20/20) with radiation (9/23/20 - 11/18/20). Held 11/9/20 -11/16/20 for neutropenia\par Completed radiation (5/14/21 - 6/3/21 )- he had WBRT and right hip\par Accompanied by his spouse\par \par He co "something stuck in his throat" yesterday. Decongestant, hot tea helped\par No fevers\par No headaches\par \par Weight - 249 -> 243 -> 244 lbs ->242lbs -> 243 lbs -> 237 lbs ->238 ->.243 lbs ->239 lbs -> 231 lbs ->232 lbs ->228 lbs -> 229 -> 225 lbs -> 223 lbs -> 227 lbs -> 224 lbs -> 223 lbs-> 213 lbs -> 212 lbs  -> 211 lbs -> 214 -> 218 lbs -> 200 lbs -> 206 lbs -> 201 lbs\par

## 2021-09-01 PROBLEM — F41.9 ANXIETY: Status: ACTIVE | Noted: 2020-08-31

## 2021-09-01 NOTE — HISTORY OF PRESENT ILLNESS
[de-identified] : Mr Potter is a very pleasant 65 years ex smoker (quit 6 months ago, 1 ppd x 40 years), seen today for further management of his small cell lung cancer\par \par He reports "flu like symptoms" starting in March 2020, saw his PCP who obtained a CXR which showed a lung nodule, subsequently had  CT chest revealed 2 RUL nodules (measuring 1.3 cm and 1.1 cm). He was sent to [facility] for biopsy, He reports that his procedure was postponed for multiple weeks, and eventually he was told that he need to see a thoracic surgeon\par He saw Dr Sarthak Fernández, who referred him to IR for image guided biopsy\par \par CT Chest 5/28/2020: Moderate to extensive centrilobular paraseptal emphysema predominantly within bilateral upper lobes. There are 2 adjacent nodular densities noted within the RUL measuring 1.3 cm and 1.1 cm. Evaluation of the rest of the lung fields demonstrates mild subsegmental atelectasis and scaring involving the lingula. There is a 0.2 cm peripheral nodule note in the RLL. Also, there is a 0.2 cm nodule noted involving the superior segment of the RLL. There is also a central nodule noted involving the EMBER which measures 0.3 cm in sized. There are no spiculated nodules. There is no consolidation. \par \par PET/CT 6/23/2020: Mild focal uptake in the 2 adjacent pulmonary nodules in the posterior RUL (max SUV 4.7). No additional FDG avid pulmonary nodules identified. Focally avid right hilar lymph node (max SUV 8.6). No abnormal uptake in the nonenlarged precarinal lymph node. Moderate sized hiatal hernia. No evidence of distant metastases\par \par s/p core biopsy of a right upper lobe lung mass (8/24/20)\par Pathology\par Small cell carcinoma. 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 He also reports that his house is from early 1900s and may have asbestos in the basement\par \par MRI brain (9/8/20)\par No evidence for intracranial metastatic disease.\par No acute infarction, acute intracranial hemorrhage, hydrocephalus or extra-axial fluid collection.\par Mild chronic microvascular ischemic changes.\par Mild opacification of the left mastoid air cells.\par \par \par He had a restaging PET/CT (1/15/21)\par Since June 23, 2020,\par 1.	Resolved hypermetabolic right upper lobe cancer and hypermetabolic right hilar adenopathy.\par 2.	New probably infectious/inflammatory changes in the right lung, probably treatment related.\par 3.	Slightly interval increase of probably reactive bone marrow uptake as described\par 4.             New FDG-avid serial fractures in the left lateral 4th - 6th ribs, posttraumatic. Minimal heterogeneous avidity throughout the bone marrow, with a few more distinct foci without CT correlate, for example in T3 vertebral body, T12 vertebral body, and right ilium, SUV 3.2, probably noise.\par \par Had MRI brain which showed diffuse brain mets. He was started on dexamethasone and his dose was increased to palliative WBRT\par \par PET/CT (5/21) showed new liver mets, lung mets, adrenal mets, bone mets\par \par He co right hip pain likely from acetabular met\par \par s/p liver biopsy with IR\par Path- SCLC\par \par \par MRI brain\par Marked interval resolution/decrease in size/enhancement of previously seen supratentorial and\par infratentorial intracranial metastases when compared to the May 2021 exam with most prominent lesion\par being a subcentimeter mildly enhancing left cerebellar lesion with evidence of chronic hemorrhage.\par The majority of previously described enhancing lesions on the May 2021 exam are no longer\par visualized.\par  [de-identified] : He is seen today for C5  lurbinectidin keytruda (6/9/21-present)\par Lurbinectidin was held on C2 due to thrombocytopenia \par s/p carboplatin etoposide x 6 (12/22/20 - 2/9/21)\par s/p C4 with Cisplatin Etoposide  (9/16/20 - 11/20/20) with radiation (9/23/20 - 11/18/20). Held 11/9/20 -11/16/20 for neutropenia\par Completed radiation (5/14/21 - 6/3/21 )- he had WBRT and right hip\par \par He does not have an appetite but continues to eat\par Has lost 5 lbs since the last appointment\par Feels stronger\par No fevers\par No headaches\par \par Thush- resolved with fluconazole\par \par PET/CT (8/24/21)\par Mixed changes in several hypermetabolic pulmonary metastases, for example:\par Decreased medial right apex lesion, image 77, 0.8 cm versus prior 1.3 cm, SUV 2.6, prior 15.9\par Resolved uptake in several patchy opacity opacities in the right lower lobe, for example image 100, 1.3 x\par 0.8 cm\par Several new patchy opacities in the right upper and lower lobes as well as adjacent to new nodular pleural thickening anterior left upper lobe, presumable right upper lobe, image 104, 5.3 x 1.8 cm region, SUV 2.3\par New mildly hypermetabolic nodular pleural thickening anterior left upper lobe, for example image 85, 3.3 x 1.5 cm, SUV 2.6.\par Focal increased mixed changes in multiple increased hypermetabolic hepatic metastases, for example:\par –	Increased size and decreased FDG uptake in dominant segment 7 lesion, image 142, 8.1 x 6.8 cm, prior 5.9 x 4.7 cm, SUV 10.9, prior 24.9.\par –	Possibly resolved central segment 5 lesion.\par –	Multiple new lesions, for example posteriorly in segment 2, image 140, measuring 1.5 cm, SUV 8.7 \par New mild diffuse bone marrow uptake is probably reactive. Near resolution of few hypermetabolic osseous metastases, for example:\par –	Decreased uptake in right posterior element of L4, SUV 3.7, prior 7.9\par –	Resolved uptake in right iliac and anterior acetabular lesions. \par \par Weight - 249 -> 243 -> 244 lbs ->242lbs -> 243 lbs -> 237 lbs ->238 ->.243 lbs ->239 lbs -> 231 lbs ->232 lbs ->228 lbs -> 229 -> 225 lbs -> 223 lbs -> 227 lbs -> 224 lbs -> 223 lbs-> 213 lbs -> 212 lbs  -> 211 lbs -> 214 -> 218 lbs -> 200 lbs -> 206 lbs -> 201 lbs -> 196 lbs\par

## 2021-09-01 NOTE — CONSULT LETTER
[FreeTextEntry3] : Javier Sanabria MD, MPH\par Attending Physician\par Hematology Oncology\par [facility] [place]\par [facility]\par

## 2021-09-01 NOTE — ASSESSMENT
[FreeTextEntry1] : Small cell lung cancer - at least stage IIB\par PET/CT (6/20) No distant metastases\par 9/8/20 Brain MRI- No mets\par -Radiation finished 11/18/20\par s/p cisplatin etoposide x 4 cycles (9/16/20 - 11/20/20) with radiation followed by 2 cycles of carboplatin and etoposide (Carbo AUC 4 and dose reduce etoposide 10%)\par 1/15/21 Restaging PET/CT shows excellent response. 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 No targetable mutations\par completed WBRT and right hip radiation in June 2021 with Dr. Hidalgo\par \par CURRENT TREATMENT - C5 lurbinectidin and keytruda (6/9/21 - present) \par C2 - Lurbinectidine was held due to thrombocytopenia plts < 100\par 7/22/21 - C3 - reducing Lurbinectidin by 15% due to counts. \par Labs reviewed, analyzed and discussed\par Counts and Transaminases gradually improving\par Restaging scans after 4 cycles show mixed response\par MRI brain shows excellent response\par Plan\par Lurbinectidin + keytruda Q3W with neulasta\par Xgeva  for bone mets - will start next treatment. \par \par Difficulty swallowing\par Thrush\par Diflucan 100 mg QD x 3 days\par Resolved \par \par #Weight loss\par Small frequent meals encourgaed\par \par #Peripheral neuropathy and gait instability - 2/2 treatment - will closely monitor\par Advised to get a cane for stability - falls precaution advised. \par \par Anxiety\par Emotional counselling done.\par Advised to spend time doing things he wants to do and not focus on his cancer. He will do something different prior to the next appointment\par \par Follow up in 3 weeks for C6 Lurbi, keytruda\par CBC, CMP, CEA

## 2021-09-07 PROBLEM — K59.09 CHRONIC CONSTIPATION: Status: ACTIVE | Noted: 2021-01-01

## 2021-09-21 NOTE — HISTORY OF PRESENT ILLNESS
[de-identified] : Mr Potter is a very pleasant 65 years ex smoker (quit 6 months ago, 1 ppd x 40 years), seen today for further management of his small cell lung cancer\par \par He reports "flu like symptoms" starting in March 2020, saw his PCP who obtained a CXR which showed a lung nodule, subsequently had  CT chest revealed 2 RUL nodules (measuring 1.3 cm and 1.1 cm). He was sent to [facility] for biopsy, He reports that his procedure was postponed for multiple weeks, and eventually he was told that he need to see a thoracic surgeon\par He saw Dr Sarthak Fernández, who referred him to IR for image guided biopsy\par \par CT Chest 5/28/2020: Moderate to extensive centrilobular paraseptal emphysema predominantly within bilateral upper lobes. There are 2 adjacent nodular densities noted within the RUL measuring 1.3 cm and 1.1 cm. Evaluation of the rest of the lung fields demonstrates mild subsegmental atelectasis and scaring involving the lingula. There is a 0.2 cm peripheral nodule note in the RLL. Also, there is a 0.2 cm nodule noted involving the superior segment of the RLL. There is also a central nodule noted involving the EMBER which measures 0.3 cm in sized. There are no spiculated nodules. There is no consolidation. \par \par PET/CT 6/23/2020: Mild focal uptake in the 2 adjacent pulmonary nodules in the posterior RUL (max SUV 4.7). No additional FDG avid pulmonary nodules identified. Focally avid right hilar lymph node (max SUV 8.6). No abnormal uptake in the nonenlarged precarinal lymph node. Moderate sized hiatal hernia. No evidence of distant metastases\par \par s/p core biopsy of a right upper lobe lung mass (8/24/20)\par Pathology\par Small cell carcinoma. 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 He also reports that his house is from early 1900s and may have asbestos in the basement\par \par MRI brain (9/8/20)\par No evidence for intracranial metastatic disease.\par No acute infarction, acute intracranial hemorrhage, hydrocephalus or extra-axial fluid collection.\par Mild chronic microvascular ischemic changes.\par Mild opacification of the left mastoid air cells.\par \par \par He had a restaging PET/CT (1/15/21)\par Since June 23, 2020,\par 1.	Resolved hypermetabolic right upper lobe cancer and hypermetabolic right hilar adenopathy.\par 2.	New probably infectious/inflammatory changes in the right lung, probably treatment related.\par 3.	Slightly interval increase of probably reactive bone marrow uptake as described\par 4.             New FDG-avid serial fractures in the left lateral 4th - 6th ribs, posttraumatic. Minimal heterogeneous avidity throughout the bone marrow, with a few more distinct foci without CT correlate, for example in T3 vertebral body, T12 vertebral body, and right ilium, SUV 3.2, probably noise.\par \par Had MRI brain which showed diffuse brain mets. He was started on dexamethasone and his dose was increased to palliative WBRT\par \par PET/CT (5/21) showed new liver mets, lung mets, adrenal mets, bone mets\par \par He co right hip pain likely from acetabular met\par \par s/p liver biopsy with IR\par Path- SCLC\par \par \par MRI brain\par Marked interval resolution/decrease in size/enhancement of previously seen supratentorial and\par infratentorial intracranial metastases when compared to the May 2021 exam with most prominent lesion\par being a subcentimeter mildly enhancing left cerebellar lesion with evidence of chronic hemorrhage.\par The majority of previously described enhancing lesions on the May 2021 exam are no longer\par visualized.\par  [de-identified] : He is seen today for C5  lurbinectidin keytruda (6/9/21-present)\par Lurbinectidin was held on C2 due to thrombocytopenia \par s/p carboplatin etoposide x 6 (12/22/20 - 2/9/21)\par s/p C4 with Cisplatin Etoposide  (9/16/20 - 11/20/20) with radiation (9/23/20 - 11/18/20). Held 11/9/20 -11/16/20 for neutropenia\par Completed radiation (5/14/21 - 6/3/21 )- he had WBRT and right hip\par \par He does not have an appetite but continues to eat\par Has lost 5 lbs since the last appointment\par Feels stronger\par No fevers\par No headaches\par \par Thush- resolved with fluconazole\par \par PET/CT (8/24/21)\par Mixed changes in several hypermetabolic pulmonary metastases, for example:\par Decreased medial right apex lesion, image 77, 0.8 cm versus prior 1.3 cm, SUV 2.6, prior 15.9\par Resolved uptake in several patchy opacity opacities in the right lower lobe, for example image 100, 1.3 x\par 0.8 cm\par Several new patchy opacities in the right upper and lower lobes as well as adjacent to new nodular pleural thickening anterior left upper lobe, presumable right upper lobe, image 104, 5.3 x 1.8 cm region, SUV 2.3\par New mildly hypermetabolic nodular pleural thickening anterior left upper lobe, for example image 85, 3.3 x 1.5 cm, SUV 2.6.\par Focal increased mixed changes in multiple increased hypermetabolic hepatic metastases, for example:\par –	Increased size and decreased FDG uptake in dominant segment 7 lesion, image 142, 8.1 x 6.8 cm, prior 5.9 x 4.7 cm, SUV 10.9, prior 24.9.\par –	Possibly resolved central segment 5 lesion.\par –	Multiple new lesions, for example posteriorly in segment 2, image 140, measuring 1.5 cm, SUV 8.7 \par New mild diffuse bone marrow uptake is probably reactive. Near resolution of few hypermetabolic osseous metastases, for example:\par –	Decreased uptake in right posterior element of L4, SUV 3.7, prior 7.9\par –	Resolved uptake in right iliac and anterior acetabular lesions. \par \par Weight - 249 -> 243 -> 244 lbs ->242lbs -> 243 lbs -> 237 lbs ->238 ->.243 lbs ->239 lbs -> 231 lbs ->232 lbs ->228 lbs -> 229 -> 225 lbs -> 223 lbs -> 227 lbs -> 224 lbs -> 223 lbs-> 213 lbs -> 212 lbs  -> 211 lbs -> 214 -> 218 lbs -> 200 lbs -> 206 lbs -> 201 lbs -> 196 lbs\par

## 2021-09-21 NOTE — CONSULT LETTER
[Dear  ___] : Dear  [unfilled], [Courtesy Letter:] : I had the pleasure of seeing your patient, [unfilled], in my office today. [Please see my note below.] : Please see my note below. [Consult Closing:] : Thank you very much for allowing me to participate in the care of this patient.  If you have any questions, please do not hesitate to contact me. [Sincerely,] : Sincerely, [FreeTextEntry3] : Javier Sanabria MD, MPH\par Attending Physician\par Hematology Oncology\par [facility] [place]\par [facility]\par  [DrSudhakar  ___] : Dr. GOLDMAN

## 2021-09-21 NOTE — PHYSICAL EXAM
[Ambulatory and capable of all self care but unable to carry out any work activities] : Status 2- Ambulatory and capable of all self care but unable to carry out any work activities. Up and about more than 50% of waking hours [Normal] : affect appropriate [de-identified] : Thrush

## 2021-09-29 PROBLEM — R10.9 ABDOMINAL PAIN: Status: ACTIVE | Noted: 2021-01-01

## 2021-09-29 NOTE — ASSESSMENT
[FreeTextEntry1] : Right flank pain\par Hepatomegaly\par New onset transaminitis\par Concern for progression of disease\par PLan of care coordinated with palliative care (dr Keane) for appropriate pain managemnt (he is on metthadone)\par Send to ED. Obtain CT C/A/P and MRI brain\par Emotional couselling done. His daughter is getting  in November 2021 and he is concerned about not making it until then\par \par Small cell lung cancer - at least stage IIB\par PET/CT (6/20) No distant metastases\par 9/8/20 Brain MRI- No mets\par -Radiation finished 11/18/20\par s/p cisplatin etoposide x 4 cycles (9/16/20 - 11/20/20) with radiation followed by 2 cycles of carboplatin and etoposide (Carbo AUC 4 and dose reduce etoposide 10%)\par 1/15/21 Restaging PET/CT shows excellent response. 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 No targetable mutations\par completed WBRT and right hip radiation in June 2021 with Dr. Hidalgo\par CURRENT TREATMENT - C6 lurbinectidin and keytruda (6/9/21 - present) \par C2 - Lurbinectidine was held due to thrombocytopenia plts < 100\par 7/22/21 - C3 - reducing Lurbinectidin by 15% due to counts. \par Labs reviewed, analyzed and discussed\par Hold chemo given worsening transaminitis\par Plan\par Lurbinectidin + keytruda Q3W with neulasta\par Xgeva  for bone mets - will start next treatment. \par \par #Weight loss\par Likely related to POD\par Small frequent meals encouraged\par \par #Peripheral neuropathy and gait instability - 2/2 treatment - will closely monitor\par Advised to get a cane for stability - falls precaution advised. \par \par Anxiety\par Emotional counselling done.\par Advised to spend time doing things he wants to do and not focus on his cancer. He will do something different prior to the next appointment\par \par Follow up post discharge\par CBC, CMP, CEA

## 2021-09-29 NOTE — HISTORY OF PRESENT ILLNESS
[de-identified] : Mr Potter is a very pleasant 65 years ex smoker (quit 6 months ago, 1 ppd x 40 years), seen today for further management of his small cell lung cancer\par \par He reports "flu like symptoms" starting in March 2020, saw his PCP who obtained a CXR which showed a lung nodule, subsequently had  CT chest revealed 2 RUL nodules (measuring 1.3 cm and 1.1 cm). He was sent to [facility] for biopsy, He reports that his procedure was postponed for multiple weeks, and eventually he was told that he need to see a thoracic surgeon\par He saw Dr Sarthak Fernández, who referred him to IR for image guided biopsy\par \par CT Chest 5/28/2020: Moderate to extensive centrilobular paraseptal emphysema predominantly within bilateral upper lobes. There are 2 adjacent nodular densities noted within the RUL measuring 1.3 cm and 1.1 cm. Evaluation of the rest of the lung fields demonstrates mild subsegmental atelectasis and scaring involving the lingula. There is a 0.2 cm peripheral nodule note in the RLL. Also, there is a 0.2 cm nodule noted involving the superior segment of the RLL. There is also a central nodule noted involving the EMBER which measures 0.3 cm in sized. There are no spiculated nodules. There is no consolidation. \par \par PET/CT 6/23/2020: Mild focal uptake in the 2 adjacent pulmonary nodules in the posterior RUL (max SUV 4.7). No additional FDG avid pulmonary nodules identified. Focally avid right hilar lymph node (max SUV 8.6). No abnormal uptake in the nonenlarged precarinal lymph node. Moderate sized hiatal hernia. No evidence of distant metastases\par \par s/p core biopsy of a right upper lobe lung mass (8/24/20)\par Pathology\par Small cell carcinoma. 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 He also reports that his house is from early 1900s and may have asbestos in the basement\par \par MRI brain (9/8/20)\par No evidence for intracranial metastatic disease.\par No acute infarction, acute intracranial hemorrhage, hydrocephalus or extra-axial fluid collection.\par Mild chronic microvascular ischemic changes.\par Mild opacification of the left mastoid air cells.\par \par \par He had a restaging PET/CT (1/15/21)\par Since June 23, 2020,\par 1.	Resolved hypermetabolic right upper lobe cancer and hypermetabolic right hilar adenopathy.\par 2.	New probably infectious/inflammatory changes in the right lung, probably treatment related.\par 3.	Slightly interval increase of probably reactive bone marrow uptake as described\par 4.             New FDG-avid serial fractures in the left lateral 4th - 6th ribs, posttraumatic. Minimal heterogeneous avidity throughout the bone marrow, with a few more distinct foci without CT correlate, for example in T3 vertebral body, T12 vertebral body, and right ilium, SUV 3.2, probably noise.\par \par Had MRI brain which showed diffuse brain mets. He was started on dexamethasone and his dose was increased to palliative WBRT\par \par PET/CT (5/21) showed new liver mets, lung mets, adrenal mets, bone mets\par \par He co right hip pain likely from acetabular met\par \par s/p liver biopsy with IR\par Path- SCLC\par \par \par MRI brain\par Marked interval resolution/decrease in size/enhancement of previously seen supratentorial and\par infratentorial intracranial metastases when compared to the May 2021 exam with most prominent lesion\par being a subcentimeter mildly enhancing left cerebellar lesion with evidence of chronic hemorrhage.\par The majority of previously described enhancing lesions on the May 2021 exam are no longer\par visualized.\par \par PET/CT (8/24/21)\par Mixed changes in several hypermetabolic pulmonary metastases, for example:\par Decreased medial right apex lesion, image 77, 0.8 cm versus prior 1.3 cm, SUV 2.6, prior 15.9\par Resolved uptake in several patchy opacity opacities in the right lower lobe, for example image 100, 1.3 x\par 0.8 cm\par Several new patchy opacities in the right upper and lower lobes as well as adjacent to new nodular pleural thickening anterior left upper lobe, presumable right upper lobe, image 104, 5.3 x 1.8 cm region, SUV 2.3\par New mildly hypermetabolic nodular pleural thickening anterior left upper lobe, for example image 85, 3.3 x 1.5 cm, SUV 2.6.\par Focal increased mixed changes in multiple increased hypermetabolic hepatic metastases, for example:\par –	Increased size and decreased FDG uptake in dominant segment 7 lesion, image 142, 8.1 x 6.8 cm, prior 5.9 x 4.7 cm, SUV 10.9, prior 24.9.\par –	Possibly resolved central segment 5 lesion.\par –	Multiple new lesions, for example posteriorly in segment 2, image 140, measuring 1.5 cm, SUV 8.7 \par New mild diffuse bone marrow uptake is probably reactive. Near resolution of few hypermetabolic osseous metastases, for example:\par –	Decreased uptake in right posterior element of L4, SUV 3.7, prior 7.9\par –	Resolved uptake in right iliac and anterior acetabular lesions. \par \par  [de-identified] : He is seen today for C6  lurbinectidin keytruda (6/9/21-present)\par Lurbinectidin was held on C2 due to thrombocytopenia \par s/p carboplatin etoposide x 6 (12/22/20 - 2/9/21)\par s/p C4 with Cisplatin Etoposide  (9/16/20 - 11/20/20) with radiation (9/23/20 - 11/18/20). Held 11/9/20 -11/16/20 for neutropenia\par Completed radiation (5/14/21 - 6/3/21 )- he had WBRT and right hip\par Accompanied by his daughter\par \par He woke up yesterday with pain in the right flank and back. Constant worse with deep inhalation, lying on right side\par Better with pain medication but does not go away. \par No fever\par No hematuria\par \par He is on methadone for pain\par \par Has lost 14 lbs since the last appointment\par No fevers\par No headaches\par \par \par Weight - 249 -> 243 -> 244 lbs ->242lbs -> 243 lbs -> 237 lbs ->238 ->.243 lbs ->239 lbs -> 231 lbs ->232 lbs ->228 lbs -> 229 -> 225 lbs -> 223 lbs -> 227 lbs -> 224 lbs -> 223 lbs-> 213 lbs -> 212 lbs  -> 211 lbs -> 214 -> 218 lbs -> 200 lbs -> 206 lbs -> 201 lbs -> 196 lbs -> 182 lbs\par

## 2021-09-29 NOTE — PHYSICAL EXAM
[Normal] : affect appropriate [Capable of only limited self care, confined to bed or chair more than 50% of waking hours] : Status 3- Capable of only limited self care, confined to bed or chair more than 50% of waking hours [de-identified] : Thrush [de-identified] : Hepatomegaly. Pain right flank.

## 2021-09-29 NOTE — CONSULT LETTER
[Dear  ___] : Dear  [unfilled], [Courtesy Letter:] : I had the pleasure of seeing your patient, [unfilled], in my office today. [Please see my note below.] : Please see my note below. [Consult Closing:] : Thank you very much for allowing me to participate in the care of this patient.  If you have any questions, please do not hesitate to contact me. [Sincerely,] : Sincerely, [DrSudhakar  ___] : Dr. GOLDMAN [FreeTextEntry3] : Javier Sanabria MD, MPH\par Attending Physician\par Hematology Oncology\par [facility] [place]\par [facility]\par

## 2021-10-01 PROBLEM — Z79.899 ON ANTINEOPLASTIC CHEMOTHERAPY: Status: ACTIVE | Noted: 2020-09-16

## 2021-10-07 NOTE — HISTORY OF PRESENT ILLNESS
[de-identified] : Mr Potter is a very pleasant 65 years ex smoker (quit 6 months ago, 1 ppd x 40 years), seen today for further management of his small cell lung cancer\par \par He reports "flu like symptoms" starting in March 2020, saw his PCP who obtained a CXR which showed a lung nodule, subsequently had  CT chest revealed 2 RUL nodules (measuring 1.3 cm and 1.1 cm). He was sent to [facility] for biopsy, He reports that his procedure was postponed for multiple weeks, and eventually he was told that he need to see a thoracic surgeon\par He saw Dr Sarthak Fernández, who referred him to IR for image guided biopsy\par \par CT Chest 5/28/2020: Moderate to extensive centrilobular paraseptal emphysema predominantly within bilateral upper lobes. There are 2 adjacent nodular densities noted within the RUL measuring 1.3 cm and 1.1 cm. Evaluation of the rest of the lung fields demonstrates mild subsegmental atelectasis and scaring involving the lingula. There is a 0.2 cm peripheral nodule note in the RLL. Also, there is a 0.2 cm nodule noted involving the superior segment of the RLL. There is also a central nodule noted involving the MEBER which measures 0.3 cm in sized. There are no spiculated nodules. There is no consolidation. \par \par PET/CT 6/23/2020: Mild focal uptake in the 2 adjacent pulmonary nodules in the posterior RUL (max SUV 4.7). No additional FDG avid pulmonary nodules identified. Focally avid right hilar lymph node (max SUV 8.6). No abnormal uptake in the nonenlarged precarinal lymph node. Moderate sized hiatal hernia. No evidence of distant metastases\par \par s/p core biopsy of a right upper lobe lung mass (8/24/20)\par Pathology\par Small cell carcinoma. 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 He also reports that his house is from early 1900s and may have asbestos in the basement\par \par MRI brain (9/8/20)\par No evidence for intracranial metastatic disease.\par No acute infarction, acute intracranial hemorrhage, hydrocephalus or extra-axial fluid collection.\par Mild chronic microvascular ischemic changes.\par Mild opacification of the left mastoid air cells.\par \par \par He had a restaging PET/CT (1/15/21)\par Since June 23, 2020,\par 1.	Resolved hypermetabolic right upper lobe cancer and hypermetabolic right hilar adenopathy.\par 2.	New probably infectious/inflammatory changes in the right lung, probably treatment related.\par 3.	Slightly interval increase of probably reactive bone marrow uptake as described\par 4.             New FDG-avid serial fractures in the left lateral 4th - 6th ribs, posttraumatic. Minimal heterogeneous avidity throughout the bone marrow, with a few more distinct foci without CT correlate, for example in T3 vertebral body, T12 vertebral body, and right ilium, SUV 3.2, probably noise.\par \par Had MRI brain which showed diffuse brain mets. He was started on dexamethasone and his dose was increased to palliative WBRT\par \par PET/CT (5/21) showed new liver mets, lung mets, adrenal mets, bone mets\par \par He co right hip pain likely from acetabular met\par \par s/p liver biopsy with IR\par Path- SCLC\par \par \par MRI brain\par Marked interval resolution/decrease in size/enhancement of previously seen supratentorial and\par infratentorial intracranial metastases when compared to the May 2021 exam with most prominent lesion\par being a subcentimeter mildly enhancing left cerebellar lesion with evidence of chronic hemorrhage.\par The majority of previously described enhancing lesions on the May 2021 exam are no longer\par visualized.\par \par PET/CT (8/24/21)\par Mixed changes in several hypermetabolic pulmonary metastases, for example:\par Decreased medial right apex lesion, image 77, 0.8 cm versus prior 1.3 cm, SUV 2.6, prior 15.9\par Resolved uptake in several patchy opacity opacities in the right lower lobe, for example image 100, 1.3 x\par 0.8 cm\par Several new patchy opacities in the right upper and lower lobes as well as adjacent to new nodular pleural thickening anterior left upper lobe, presumable right upper lobe, image 104, 5.3 x 1.8 cm region, SUV 2.3\par New mildly hypermetabolic nodular pleural thickening anterior left upper lobe, for example image 85, 3.3 x 1.5 cm, SUV 2.6.\par Focal increased mixed changes in multiple increased hypermetabolic hepatic metastases, for example:\par –	Increased size and decreased FDG uptake in dominant segment 7 lesion, image 142, 8.1 x 6.8 cm, prior 5.9 x 4.7 cm, SUV 10.9, prior 24.9.\par –	Possibly resolved central segment 5 lesion.\par –	Multiple new lesions, for example posteriorly in segment 2, image 140, measuring 1.5 cm, SUV 8.7 \par New mild diffuse bone marrow uptake is probably reactive. Near resolution of few hypermetabolic osseous metastases, for example:\par –	Decreased uptake in right posterior element of L4, SUV 3.7, prior 7.9\par –	Resolved uptake in right iliac and anterior acetabular lesions. \par \par  [de-identified] : He is seen today for C6  lurbinectidin keytruda (6/9/21-present)\par Lurbinectidin was held on C2 due to thrombocytopenia \par s/p carboplatin etoposide x 6 (12/22/20 - 2/9/21)\par s/p C4 with Cisplatin Etoposide  (9/16/20 - 11/20/20) with radiation (9/23/20 - 11/18/20). Held 11/9/20 -11/16/20 for neutropenia\par Completed radiation (5/14/21 - 6/3/21 )- he had WBRT and right hip\par Accompanied by his daughter\par \par He woke up yesterday with pain in the right flank and back. Constant worse with deep inhalation, lying on right side\par Better with pain medication but does not go away. \par No fever\par No hematuria\par \par He is on methadone for pain\par \par Has lost 14 lbs since the last appointment\par No fevers\par No headaches\par \par \par Weight - 249 -> 243 -> 244 lbs ->242lbs -> 243 lbs -> 237 lbs ->238 ->.243 lbs ->239 lbs -> 231 lbs ->232 lbs ->228 lbs -> 229 -> 225 lbs -> 223 lbs -> 227 lbs -> 224 lbs -> 223 lbs-> 213 lbs -> 212 lbs  -> 211 lbs -> 214 -> 218 lbs -> 200 lbs -> 206 lbs -> 201 lbs -> 196 lbs -> 182 lbs\par

## 2021-10-07 NOTE — HISTORY OF PRESENT ILLNESS
[FreeTextEntry1] : Vikram Potter is a 64 y/o M w/ PMH metastatic SCLCA (d/x 03/20) w/ POD to brain/liver/bone/adrenals/lung, opioid abuse on Methadone for maintenance previously, now on it for pain, recent admission to [facility] for worsening abdominal pain and transaminitis found to have worsening liver mets who now presents for a primary palliative care f/u. \par \par He was recently discharged from [facility] on Methadone 80mg TID + Dilaudid 4mg PO Q4-6H PRN- previously had been only taking 1,600mg in the morning at once instead of the 80mg TID advised.

## 2021-10-07 NOTE — CONSULT LETTER
[Dear  ___] : Dear  [unfilled], [Courtesy Letter:] : I had the pleasure of seeing your patient, [unfilled], in my office today. [Please see my note below.] : Please see my note below. [Consult Closing:] : Thank you very much for allowing me to participate in the care of this patient.  If you have any questions, please do not hesitate to contact me. [Sincerely,] : Sincerely, [FreeTextEntry3] : Javier Sanabria MD, MPH\par Attending Physician\par Hematology Oncology\par [facility] [place]\par [facility]\par  [DrSudhakar  ___] : Dr. GOLDMAN

## 2021-10-07 NOTE — PHYSICAL EXAM
[Capable of only limited self care, confined to bed or chair more than 50% of waking hours] : Status 3- Capable of only limited self care, confined to bed or chair more than 50% of waking hours [Normal] : affect appropriate [de-identified] : Thrush [de-identified] : Hepatomegaly. Pain right flank.

## 2021-10-13 NOTE — CONSULT LETTER
[Dear  ___] : Dear  [unfilled], [Courtesy Letter:] : I had the pleasure of seeing your patient, [unfilled], in my office today. [Please see my note below.] : Please see my note below. [Consult Closing:] : Thank you very much for allowing me to participate in the care of this patient.  If you have any questions, please do not hesitate to contact me. [Sincerely,] : Sincerely, [DrSudhakar  ___] : Dr. GOLDMAN

## 2021-10-15 PROBLEM — E86.0 DEHYDRATION: Status: ACTIVE | Noted: 2020-11-02

## 2021-10-15 NOTE — CONSULT LETTER
[FreeTextEntry3] : Javier Sanabria MD, MPH\par Attending Physician\par Hematology Oncology\par [facility] [place]\par [facility]\par

## 2021-10-15 NOTE — HISTORY OF PRESENT ILLNESS
[de-identified] : Mr Potter is a very pleasant 65 years ex smoker (quit 6 months ago, 1 ppd x 40 years), seen today for further management of his small cell lung cancer\par \par He reports "flu like symptoms" starting in March 2020, saw his PCP who obtained a CXR which showed a lung nodule, subsequently had  CT chest revealed 2 RUL nodules (measuring 1.3 cm and 1.1 cm). He was sent to [facility] for biopsy, He reports that his procedure was postponed for multiple weeks, and eventually he was told that he need to see a thoracic surgeon\par He saw Dr Sarthak Fernández, who referred him to IR for image guided biopsy\par \par CT Chest 5/28/2020: Moderate to extensive centrilobular paraseptal emphysema predominantly within bilateral upper lobes. There are 2 adjacent nodular densities noted within the RUL measuring 1.3 cm and 1.1 cm. Evaluation of the rest of the lung fields demonstrates mild subsegmental atelectasis and scaring involving the lingula. There is a 0.2 cm peripheral nodule note in the RLL. Also, there is a 0.2 cm nodule noted involving the superior segment of the RLL. There is also a central nodule noted involving the EMBER which measures 0.3 cm in sized. There are no spiculated nodules. There is no consolidation. \par \par PET/CT 6/23/2020: Mild focal uptake in the 2 adjacent pulmonary nodules in the posterior RUL (max SUV 4.7). No additional FDG avid pulmonary nodules identified. Focally avid right hilar lymph node (max SUV 8.6). No abnormal uptake in the nonenlarged precarinal lymph node. Moderate sized hiatal hernia. No evidence of distant metastases\par \par s/p core biopsy of a right upper lobe lung mass (8/24/20)\par Pathology\par Small cell carcinoma. 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 He also reports that his house is from early 1900s and may have asbestos in the basement\par \par MRI brain (9/8/20)\par No evidence for intracranial metastatic disease.\par No acute infarction, acute intracranial hemorrhage, hydrocephalus or extra-axial fluid collection.\par Mild chronic microvascular ischemic changes.\par Mild opacification of the left mastoid air cells.\par \par \par He had a restaging PET/CT (1/15/21)\par Since June 23, 2020,\par 1.	Resolved hypermetabolic right upper lobe cancer and hypermetabolic right hilar adenopathy.\par 2.	New probably infectious/inflammatory changes in the right lung, probably treatment related.\par 3.	Slightly interval increase of probably reactive bone marrow uptake as described\par 4.             New FDG-avid serial fractures in the left lateral 4th - 6th ribs, posttraumatic. Minimal heterogeneous avidity throughout the bone marrow, with a few more distinct foci without CT correlate, for example in T3 vertebral body, T12 vertebral body, and right ilium, SUV 3.2, probably noise.\par \par Had MRI brain which showed diffuse brain mets. He was started on dexamethasone and his dose was increased to palliative WBRT\par \par PET/CT (5/21) showed new liver mets, lung mets, adrenal mets, bone mets\par \par He co right hip pain likely from acetabular met\par \par s/p liver biopsy with IR\par Path- SCLC\par \par \par MRI brain\par Marked interval resolution/decrease in size/enhancement of previously seen supratentorial and\par infratentorial intracranial metastases when compared to the May 2021 exam with most prominent lesion\par being a subcentimeter mildly enhancing left cerebellar lesion with evidence of chronic hemorrhage.\par The majority of previously described enhancing lesions on the May 2021 exam are no longer\par visualized.\par \par PET/CT (8/24/21)\par Mixed changes in several hypermetabolic pulmonary metastases, for example:\par Decreased medial right apex lesion, image 77, 0.8 cm versus prior 1.3 cm, SUV 2.6, prior 15.9\par Resolved uptake in several patchy opacity opacities in the right lower lobe, for example image 100, 1.3 x\par 0.8 cm\par Several new patchy opacities in the right upper and lower lobes as well as adjacent to new nodular pleural thickening anterior left upper lobe, presumable right upper lobe, image 104, 5.3 x 1.8 cm region, SUV 2.3\par New mildly hypermetabolic nodular pleural thickening anterior left upper lobe, for example image 85, 3.3 x 1.5 cm, SUV 2.6.\par Focal increased mixed changes in multiple increased hypermetabolic hepatic metastases, for example:\par –	Increased size and decreased FDG uptake in dominant segment 7 lesion, image 142, 8.1 x 6.8 cm, prior 5.9 x 4.7 cm, SUV 10.9, prior 24.9.\par –	Possibly resolved central segment 5 lesion.\par –	Multiple new lesions, for example posteriorly in segment 2, image 140, measuring 1.5 cm, SUV 8.7 \par New mild diffuse bone marrow uptake is probably reactive. Near resolution of few hypermetabolic osseous metastases, for example:\par –	Decreased uptake in right posterior element of L4, SUV 3.7, prior 7.9\par –	Resolved uptake in right iliac and anterior acetabular lesions. \par \par  [de-identified] : He is seen today for follow up\par He has started topotecan (10/11/12- present)\par s/p C6  lurbinectidin keytruda (6/9/21- 9/29/21)\par Lurbinectidin was held on C2 due to thrombocytopenia \par s/p carboplatin etoposide x 6 (12/22/20 - 2/9/21)\par s/p C4 with Cisplatin Etoposide  (9/16/20 - 11/20/20) with radiation (9/23/20 - 11/18/20). Held 11/9/20 -11/16/20 for neutropenia\par Completed radiation (5/14/21 - 6/3/21 )- he had WBRT and right hip\par Accompanied by his wife\par \par He is taking topotecan 3 mg QD \par His biggest side effect is diarrhea -> 2 times a day. soft  Does not take imodium\par Also has dysphagia- on description sounds more like nausea\par Also has abdominal pain\par \par He is on methadone for pain. Working with Dr Keane\par \par Weight stable since the last appointment\par No fevers\par No headaches\par \par \par Weight - 249 -> 243 -> 244 lbs ->242lbs -> 243 lbs -> 237 lbs ->238 ->.243 lbs ->239 lbs -> 231 lbs ->232 lbs ->228 lbs -> 229 -> 225 lbs -> 223 lbs -> 227 lbs -> 224 lbs -> 223 lbs-> 213 lbs -> 212 lbs  -> 211 lbs -> 214 -> 218 lbs -> 200 lbs -> 206 lbs -> 201 lbs -> 196 lbs -> 182 lbs -> 182 lbs\par

## 2021-10-15 NOTE — ASSESSMENT
[FreeTextEntry1] : Small cell lung cancer - at least stage IIB\par PET/CT (6/20) No distant metastases\par 9/8/20 Brain MRI- No mets\par -Radiation finished 11/18/20\par s/p cisplatin etoposide x 4 cycles (9/16/20 - 11/20/20) with radiation followed by 2 cycles of carboplatin and etoposide (Carbo AUC 4 and dose reduce etoposide 10%)\par 1/15/21 Restaging PET/CT shows excellent response. 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 No targetable mutations\par completed WBRT and right hip radiation in June 2021 with Dr. Hidalgo\par CURRENT TREATMENT - C6 lurbinectidin and keytruda (6/9/21 - present) \par C2 - Lurbinectidine was held due to thrombocytopenia plts < 100\par 7/22/21 - C3 - reducing Lurbinectidin by 15% due to counts.\par s/p C6  lurbinectidin keytruda (6/9/21- 9/29/21) \par POD\par Switched to topotecan (10/11/12- present)\par \par Overall very deconditioned\par Refer for home physical therapy\par Diarrhea, dehydration - IV Hydration today. Imodium PRN\par Labs reviewed, analyzed and discussed\par Continue with topotecan 3 mg x 5 days every 21 days\par Can increase to 4 mg if tolerated better\par \par Weight loss\par Likely related to POD\par Small frequent meals encouraged\par \par #Peripheral neuropathy and gait instability - 2/2 treatment - will closely monitor\par Advised to get a cane for stability - falls precaution advised. \par \par Anxiety\par Emotional counselling done.\par Advised to spend time doing things he wants to do and not focus on his cancer. He will do something different prior to the next appointment\par \par Follow up in 1 week for yareli labs. Transfuse PRN\par CBC, CMP, CEA, type and screen

## 2021-10-22 NOTE — ASSESSMENT
[FreeTextEntry1] : Small cell lung cancer - at least stage IIB\par PET/CT (6/20) No distant metastases\par 9/8/20 Brain MRI- No mets\par -Radiation finished 11/18/20\par s/p cisplatin etoposide x 4 cycles (9/16/20 - 11/20/20) with radiation followed by 2 cycles of carboplatin and etoposide (Carbo AUC 4 and dose reduce etoposide 10%)\par 1/15/21 Restaging PET/CT shows excellent response. 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 No targetable mutations\par completed WBRT and right hip radiation in June 2021 with Dr. Hidalgo\par CURRENT TREATMENT - C6 lurbinectidin and keytruda (6/9/21 - present) \par C2 - Lurbinectidine was held due to thrombocytopenia plts < 100\par 7/22/21 - C3 - reducing Lurbinectidin by 15% due to counts.\par s/p C6  lurbinectidin keytruda (6/9/21- 9/29/21) \par POD\par Switched to topotecan x 5 days every 21 days  (10/11/12- present) \par \par Overall very deconditioned\par Refer for home physical therapy\par Diarrhea with Topotecan that finally resolved since yesterday with Lomotil and Imodium \par Dehydration - 1 liter NS given today \par Labs reviewed, analyzed and discussed\par \par #pancytopenia\par Anc 1.8 Hgb 9.0 Plts 5\par -thrombocytopenia - 2/2 to topotecan - denies any bleeding. platelets transfusion given today - signs and symptoms stressed to patient and daughter. To go to straight to ER if any new problems at home\par \par #Deconditioned\par continue with PT. \par Falls precaution advised. \par \par #Weight loss\par Likely related to POD\par Small frequent meals encouraged\par \par #Peripheral neuropathy and gait instability - 2/2 treatment - will closely monitor\par Advised to get a cane for stability - falls precaution advised. \par \par Anxiety\par Emotional counselling done.\par Advised to spend time doing things he wants to do and not focus on his cancer. He will do something different prior to the next appointment\par \par d/w Dr. Sanabria \par Follow up in 1 week for straight to ED if any problems arise at home. Transfuse PRN\par CBC, CMP, CEA, type and screen

## 2021-10-22 NOTE — PHYSICAL EXAM
[Capable of only limited self care, confined to bed or chair more than 50% of waking hours] : Status 3- Capable of only limited self care, confined to bed or chair more than 50% of waking hours [Normal] : affect appropriate [de-identified] : walks with walker [de-identified] : Hepatomegaly. Pain right flank.  [de-identified] : scraping on his right knee

## 2021-10-22 NOTE — HISTORY OF PRESENT ILLNESS
[de-identified] : Mr Potter is a very pleasant 65 years ex smoker (quit 6 months ago, 1 ppd x 40 years), seen today for further management of his small cell lung cancer\par \par He reports "flu like symptoms" starting in March 2020, saw his PCP who obtained a CXR which showed a lung nodule, subsequently had  CT chest revealed 2 RUL nodules (measuring 1.3 cm and 1.1 cm). He was sent to [facility] for biopsy, He reports that his procedure was postponed for multiple weeks, and eventually he was told that he need to see a thoracic surgeon\par He saw Dr Sarthak Fernández, who referred him to IR for image guided biopsy\par \par CT Chest 5/28/2020: Moderate to extensive centrilobular paraseptal emphysema predominantly within bilateral upper lobes. There are 2 adjacent nodular densities noted within the RUL measuring 1.3 cm and 1.1 cm. Evaluation of the rest of the lung fields demonstrates mild subsegmental atelectasis and scaring involving the lingula. There is a 0.2 cm peripheral nodule note in the RLL. Also, there is a 0.2 cm nodule noted involving the superior segment of the RLL. There is also a central nodule noted involving the EMBER which measures 0.3 cm in sized. There are no spiculated nodules. There is no consolidation. \par \par PET/CT 6/23/2020: Mild focal uptake in the 2 adjacent pulmonary nodules in the posterior RUL (max SUV 4.7). No additional FDG avid pulmonary nodules identified. Focally avid right hilar lymph node (max SUV 8.6). No abnormal uptake in the nonenlarged precarinal lymph node. Moderate sized hiatal hernia. No evidence of distant metastases\par \par s/p core biopsy of a right upper lobe lung mass (8/24/20)\par Pathology\par Small cell carcinoma. 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 He also reports that his house is from early 1900s and may have asbestos in the basement\par \par MRI brain (9/8/20)\par No evidence for intracranial metastatic disease.\par No acute infarction, acute intracranial hemorrhage, hydrocephalus or extra-axial fluid collection.\par Mild chronic microvascular ischemic changes.\par Mild opacification of the left mastoid air cells.\par \par \par He had a restaging PET/CT (1/15/21)\par Since June 23, 2020,\par 1.	Resolved hypermetabolic right upper lobe cancer and hypermetabolic right hilar adenopathy.\par 2.	New probably infectious/inflammatory changes in the right lung, probably treatment related.\par 3.	Slightly interval increase of probably reactive bone marrow uptake as described\par 4.             New FDG-avid serial fractures in the left lateral 4th - 6th ribs, posttraumatic. Minimal heterogeneous avidity throughout the bone marrow, with a few more distinct foci without CT correlate, for example in T3 vertebral body, T12 vertebral body, and right ilium, SUV 3.2, probably noise.\par \par Had MRI brain which showed diffuse brain mets. He was started on dexamethasone and his dose was increased to palliative WBRT\par \par PET/CT (5/21) showed new liver mets, lung mets, adrenal mets, bone mets\par \par He co right hip pain likely from acetabular met\par \par s/p liver biopsy with IR\par Path- SCLC\par \par \par MRI brain\par Marked interval resolution/decrease in size/enhancement of previously seen supratentorial and\par infratentorial intracranial metastases when compared to the May 2021 exam with most prominent lesion\par being a subcentimeter mildly enhancing left cerebellar lesion with evidence of chronic hemorrhage.\par The majority of previously described enhancing lesions on the May 2021 exam are no longer\par visualized.\par \par PET/CT (8/24/21)\par Mixed changes in several hypermetabolic pulmonary metastases, for example:\par Decreased medial right apex lesion, image 77, 0.8 cm versus prior 1.3 cm, SUV 2.6, prior 15.9\par Resolved uptake in several patchy opacity opacities in the right lower lobe, for example image 100, 1.3 x\par 0.8 cm\par Several new patchy opacities in the right upper and lower lobes as well as adjacent to new nodular pleural thickening anterior left upper lobe, presumable right upper lobe, image 104, 5.3 x 1.8 cm region, SUV 2.3\par New mildly hypermetabolic nodular pleural thickening anterior left upper lobe, for example image 85, 3.3 x 1.5 cm, SUV 2.6.\par Focal increased mixed changes in multiple increased hypermetabolic hepatic metastases, for example:\par –	Increased size and decreased FDG uptake in dominant segment 7 lesion, image 142, 8.1 x 6.8 cm, prior 5.9 x 4.7 cm, SUV 10.9, prior 24.9.\par –	Possibly resolved central segment 5 lesion.\par –	Multiple new lesions, for example posteriorly in segment 2, image 140, measuring 1.5 cm, SUV 8.7 \par New mild diffuse bone marrow uptake is probably reactive. Near resolution of few hypermetabolic osseous metastases, for example:\par –	Decreased uptake in right posterior element of L4, SUV 3.7, prior 7.9\par –	Resolved uptake in right iliac and anterior acetabular lesions. \par \par  [de-identified] : He is seen today for follow up\par He has started topotecan x 5 days (10/11/12- present)\par s/p C6  lurbinectidin keytruda (6/9/21- 9/29/21)\par Lurbinectidin was held on C2 due to thrombocytopenia \par s/p carboplatin etoposide x 6 (12/22/20 - 2/9/21)\par s/p C4 with Cisplatin Etoposide  (9/16/20 - 11/20/20) with radiation (9/23/20 - 11/18/20). Held 11/9/20 -11/16/20 for neutropenia\par Completed radiation (5/14/21 - 6/3/21 )- he had WBRT and right hip\par Accompanied by his daughter\par \par Patient with diarrhea since taking Topotecan on 10/11/21, symptoms finally resolved yesterday with Lomotil and Imodium. He is physically declining, walking with walker, losing his balance as he got out of the and scraped his knee. Denies hitting his head. \par He's trying to push for lots of fluid intake (Gatorade and bananas). \par \par Denies n/v, fever, headaches, SOB/BERNSTEIN, bleeding. \par \par \par Weight - 249 -> 243 -> 244 lbs ->242lbs -> 243 lbs -> 237 lbs ->238 ->.243 lbs ->239 lbs -> 231 lbs ->232 lbs ->228 lbs -> 229 -> 225 lbs -> 223 lbs -> 227 lbs -> 224 lbs -> 223 lbs-> 213 lbs -> 212 lbs  -> 211 lbs -> 214 -> 218 lbs -> 200 lbs -> 206 lbs -> 201 lbs -> 196 lbs -> 182 lbs -> 182 lbs\par

## 2021-10-22 NOTE — HISTORY OF PRESENT ILLNESS
[de-identified] : Mr Potter is a very pleasant 65 years ex smoker (quit 6 months ago, 1 ppd x 40 years), seen today for further management of his small cell lung cancer\par \par He reports "flu like symptoms" starting in March 2020, saw his PCP who obtained a CXR which showed a lung nodule, subsequently had  CT chest revealed 2 RUL nodules (measuring 1.3 cm and 1.1 cm). He was sent to [facility] for biopsy, He reports that his procedure was postponed for multiple weeks, and eventually he was told that he need to see a thoracic surgeon\par He saw Dr Sarthak Fernández, who referred him to IR for image guided biopsy\par \par CT Chest 5/28/2020: Moderate to extensive centrilobular paraseptal emphysema predominantly within bilateral upper lobes. There are 2 adjacent nodular densities noted within the RUL measuring 1.3 cm and 1.1 cm. Evaluation of the rest of the lung fields demonstrates mild subsegmental atelectasis and scaring involving the lingula. There is a 0.2 cm peripheral nodule note in the RLL. Also, there is a 0.2 cm nodule noted involving the superior segment of the RLL. There is also a central nodule noted involving the EMBER which measures 0.3 cm in sized. There are no spiculated nodules. There is no consolidation. \par \par PET/CT 6/23/2020: Mild focal uptake in the 2 adjacent pulmonary nodules in the posterior RUL (max SUV 4.7). No additional FDG avid pulmonary nodules identified. Focally avid right hilar lymph node (max SUV 8.6). No abnormal uptake in the nonenlarged precarinal lymph node. Moderate sized hiatal hernia. No evidence of distant metastases\par \par s/p core biopsy of a right upper lobe lung mass (8/24/20)\par Pathology\par Small cell carcinoma. 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 He also reports that his house is from early 1900s and may have asbestos in the basement\par \par MRI brain (9/8/20)\par No evidence for intracranial metastatic disease.\par No acute infarction, acute intracranial hemorrhage, hydrocephalus or extra-axial fluid collection.\par Mild chronic microvascular ischemic changes.\par Mild opacification of the left mastoid air cells.\par \par \par He had a restaging PET/CT (1/15/21)\par Since June 23, 2020,\par 1.	Resolved hypermetabolic right upper lobe cancer and hypermetabolic right hilar adenopathy.\par 2.	New probably infectious/inflammatory changes in the right lung, probably treatment related.\par 3.	Slightly interval increase of probably reactive bone marrow uptake as described\par 4.             New FDG-avid serial fractures in the left lateral 4th - 6th ribs, posttraumatic. Minimal heterogeneous avidity throughout the bone marrow, with a few more distinct foci without CT correlate, for example in T3 vertebral body, T12 vertebral body, and right ilium, SUV 3.2, probably noise.\par \par Had MRI brain which showed diffuse brain mets. He was started on dexamethasone and his dose was increased to palliative WBRT\par \par PET/CT (5/21) showed new liver mets, lung mets, adrenal mets, bone mets\par \par He co right hip pain likely from acetabular met\par \par s/p liver biopsy with IR\par Path- SCLC\par \par \par MRI brain\par Marked interval resolution/decrease in size/enhancement of previously seen supratentorial and\par infratentorial intracranial metastases when compared to the May 2021 exam with most prominent lesion\par being a subcentimeter mildly enhancing left cerebellar lesion with evidence of chronic hemorrhage.\par The majority of previously described enhancing lesions on the May 2021 exam are no longer\par visualized.\par \par PET/CT (8/24/21)\par Mixed changes in several hypermetabolic pulmonary metastases, for example:\par Decreased medial right apex lesion, image 77, 0.8 cm versus prior 1.3 cm, SUV 2.6, prior 15.9\par Resolved uptake in several patchy opacity opacities in the right lower lobe, for example image 100, 1.3 x\par 0.8 cm\par Several new patchy opacities in the right upper and lower lobes as well as adjacent to new nodular pleural thickening anterior left upper lobe, presumable right upper lobe, image 104, 5.3 x 1.8 cm region, SUV 2.3\par New mildly hypermetabolic nodular pleural thickening anterior left upper lobe, for example image 85, 3.3 x 1.5 cm, SUV 2.6.\par Focal increased mixed changes in multiple increased hypermetabolic hepatic metastases, for example:\par –	Increased size and decreased FDG uptake in dominant segment 7 lesion, image 142, 8.1 x 6.8 cm, prior 5.9 x 4.7 cm, SUV 10.9, prior 24.9.\par –	Possibly resolved central segment 5 lesion.\par –	Multiple new lesions, for example posteriorly in segment 2, image 140, measuring 1.5 cm, SUV 8.7 \par New mild diffuse bone marrow uptake is probably reactive. Near resolution of few hypermetabolic osseous metastases, for example:\par –	Decreased uptake in right posterior element of L4, SUV 3.7, prior 7.9\par –	Resolved uptake in right iliac and anterior acetabular lesions. \par \par  [de-identified] : He is seen today for follow up\par He has started topotecan x 5 days (10/11/12- present)\par s/p C6  lurbinectidin keytruda (6/9/21- 9/29/21)\par Lurbinectidin was held on C2 due to thrombocytopenia \par s/p carboplatin etoposide x 6 (12/22/20 - 2/9/21)\par s/p C4 with Cisplatin Etoposide  (9/16/20 - 11/20/20) with radiation (9/23/20 - 11/18/20). Held 11/9/20 -11/16/20 for neutropenia\par Completed radiation (5/14/21 - 6/3/21 )- he had WBRT and right hip\par Accompanied by his daughter\par \par Patient with diarrhea since taking Topotecan on 10/11/21, symptoms finally resolved yesterday with Lomotil and Imodium. He is physically declining, walking with walker, losing his balance as he got out of the and scraped his knee. Denies hitting his head. \par He's trying to push for lots of fluid intake (Gatorade and bananas). \par \par Denies n/v, fever, headaches, SOB/BERNSTEIN, bleeding. \par \par \par Weight - 249 -> 243 -> 244 lbs ->242lbs -> 243 lbs -> 237 lbs ->238 ->.243 lbs ->239 lbs -> 231 lbs ->232 lbs ->228 lbs -> 229 -> 225 lbs -> 223 lbs -> 227 lbs -> 224 lbs -> 223 lbs-> 213 lbs -> 212 lbs  -> 211 lbs -> 214 -> 218 lbs -> 200 lbs -> 206 lbs -> 201 lbs -> 196 lbs -> 182 lbs -> 182 lbs\par

## 2021-10-22 NOTE — CONSULT LETTER
[Dear  ___] : Dear  [unfilled], [Courtesy Letter:] : I had the pleasure of seeing your patient, [unfilled], in my office today. [Please see my note below.] : Please see my note below. [Consult Closing:] : Thank you very much for allowing me to participate in the care of this patient.  If you have any questions, please do not hesitate to contact me. [Sincerely,] : Sincerely, [DrSudhakar  ___] : Dr. GOLDMAN [FreeTextEntry3] : Javier Sanabria MD, MPH\par Attending Physician\par Hematology Oncology\par [facility] [facility]\par [facility]\par

## 2021-10-22 NOTE — CONSULT LETTER
[Dear  ___] : Dear  [unfilled], [Courtesy Letter:] : I had the pleasure of seeing your patient, [unfilled], in my office today. [Please see my note below.] : Please see my note below. [Consult Closing:] : Thank you very much for allowing me to participate in the care of this patient.  If you have any questions, please do not hesitate to contact me. [Sincerely,] : Sincerely, [DrSudhakar  ___] : Dr. GOLDMAN [FreeTextEntry3] : Javier Sanabria MD, MPH\par Attending Physician\par Hematology Oncology\par [facility] [place]\par [facility]\par

## 2021-10-22 NOTE — PHYSICAL EXAM
Speaking Coherently [Capable of only limited self care, confined to bed or chair more than 50% of waking hours] : Status 3- Capable of only limited self care, confined to bed or chair more than 50% of waking hours [Normal] : affect appropriate [de-identified] : walks with walker [de-identified] : Hepatomegaly. Pain right flank.  [de-identified] : scraping on his right knee

## 2021-10-29 NOTE — RESULTS/DATA
[FreeTextEntry1] : Labs reviewed, analyzed and discussed\par 10/29/21 wbc 2.2 anc 1.2 Hgb 7.8 Hct 23.1 plts 23

## 2021-10-29 NOTE — HISTORY OF PRESENT ILLNESS
[de-identified] : Mr Potter is a very pleasant 65 years ex smoker (quit 6 months ago, 1 ppd x 40 years), seen today for further management of his small cell lung cancer\par \par He reports "flu like symptoms" starting in March 2020, saw his PCP who obtained a CXR which showed a lung nodule, subsequently had  CT chest revealed 2 RUL nodules (measuring 1.3 cm and 1.1 cm). He was sent to [facility] for biopsy, He reports that his procedure was postponed for multiple weeks, and eventually he was told that he need to see a thoracic surgeon\par He saw Dr Sarthak Fernández, who referred him to IR for image guided biopsy\par \par CT Chest 5/28/2020: Moderate to extensive centrilobular paraseptal emphysema predominantly within bilateral upper lobes. There are 2 adjacent nodular densities noted within the RUL measuring 1.3 cm and 1.1 cm. Evaluation of the rest of the lung fields demonstrates mild subsegmental atelectasis and scaring involving the lingula. There is a 0.2 cm peripheral nodule note in the RLL. Also, there is a 0.2 cm nodule noted involving the superior segment of the RLL. There is also a central nodule noted involving the EMBER which measures 0.3 cm in sized. There are no spiculated nodules. There is no consolidation. \par \par PET/CT 6/23/2020: Mild focal uptake in the 2 adjacent pulmonary nodules in the posterior RUL (max SUV 4.7). No additional FDG avid pulmonary nodules identified. Focally avid right hilar lymph node (max SUV 8.6). No abnormal uptake in the nonenlarged precarinal lymph node. Moderate sized hiatal hernia. No evidence of distant metastases\par \par s/p core biopsy of a right upper lobe lung mass (8/24/20)\par Pathology\par Small cell carcinoma. 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 He also reports that his house is from early 1900s and may have asbestos in the basement\par \par MRI brain (9/8/20)\par No evidence for intracranial metastatic disease.\par No acute infarction, acute intracranial hemorrhage, hydrocephalus or extra-axial fluid collection.\par Mild chronic microvascular ischemic changes.\par Mild opacification of the left mastoid air cells.\par \par \par He had a restaging PET/CT (1/15/21)\par Since June 23, 2020,\par 1.	Resolved hypermetabolic right upper lobe cancer and hypermetabolic right hilar adenopathy.\par 2.	New probably infectious/inflammatory changes in the right lung, probably treatment related.\par 3.	Slightly interval increase of probably reactive bone marrow uptake as described\par 4.             New FDG-avid serial fractures in the left lateral 4th - 6th ribs, posttraumatic. Minimal heterogeneous avidity throughout the bone marrow, with a few more distinct foci without CT correlate, for example in T3 vertebral body, T12 vertebral body, and right ilium, SUV 3.2, probably noise.\par \par Had MRI brain which showed diffuse brain mets. He was started on dexamethasone and his dose was increased to palliative WBRT\par \par PET/CT (5/21) showed new liver mets, lung mets, adrenal mets, bone mets\par \par He co right hip pain likely from acetabular met\par \par s/p liver biopsy with IR\par Path- SCLC\par \par \par MRI brain\par Marked interval resolution/decrease in size/enhancement of previously seen supratentorial and\par infratentorial intracranial metastases when compared to the May 2021 exam with most prominent lesion\par being a subcentimeter mildly enhancing left cerebellar lesion with evidence of chronic hemorrhage.\par The majority of previously described enhancing lesions on the May 2021 exam are no longer\par visualized.\par \par PET/CT (8/24/21)\par Mixed changes in several hypermetabolic pulmonary metastases, for example:\par Decreased medial right apex lesion, image 77, 0.8 cm versus prior 1.3 cm, SUV 2.6, prior 15.9\par Resolved uptake in several patchy opacity opacities in the right lower lobe, for example image 100, 1.3 x\par 0.8 cm\par Several new patchy opacities in the right upper and lower lobes as well as adjacent to new nodular pleural thickening anterior left upper lobe, presumable right upper lobe, image 104, 5.3 x 1.8 cm region, SUV 2.3\par New mildly hypermetabolic nodular pleural thickening anterior left upper lobe, for example image 85, 3.3 x 1.5 cm, SUV 2.6.\par Focal increased mixed changes in multiple increased hypermetabolic hepatic metastases, for example:\par –	Increased size and decreased FDG uptake in dominant segment 7 lesion, image 142, 8.1 x 6.8 cm, prior 5.9 x 4.7 cm, SUV 10.9, prior 24.9.\par –	Possibly resolved central segment 5 lesion.\par –	Multiple new lesions, for example posteriorly in segment 2, image 140, measuring 1.5 cm, SUV 8.7 \par New mild diffuse bone marrow uptake is probably reactive. Near resolution of few hypermetabolic osseous metastases, for example:\par –	Decreased uptake in right posterior element of L4, SUV 3.7, prior 7.9\par –	Resolved uptake in right iliac and anterior acetabular lesions. \par \par  [de-identified] : He is seen today for follow up\par He has started topotecan x 5 days (10/11/12)\par s/p C6  lurbinectidin keytruda (6/9/21- 9/29/21)\par Lurbinectidin was held on C2 due to thrombocytopenia \par s/p carboplatin etoposide x 6 (12/22/20 - 2/9/21)\par s/p C4 with Cisplatin Etoposide  (9/16/20 - 11/20/20) with radiation (9/23/20 - 11/18/20). Held 11/9/20 -11/16/20 for neutropenia\par Completed radiation (5/14/21 - 6/3/21 )- he had WBRT and right hip\par Accompanied by his spouse\par \par Patient reports of being weaker and deconditioning, currently using rolling walker but limited. He doesn't remember falling but has bruise on his left without pain or tenderness. Diarrhea has resolved, no longer taking Lomotil or Imodium. \par Patient with new onset b/l lower ext edema. \par As per his wife, patient has been eating better and has not witness any recent falls. \par Denies n/v, fever, headaches, SOB/BERNSTEIN, bleeding. \par \par Weight - 249 -> 243 -> 244 lbs ->242 lbs -> 243 lbs -> 237 lbs ->238 ->.243 lbs ->239 lbs -> 231 lbs ->232 lbs ->228 lbs -> 229 -> 225 lbs -> 223 lbs -> 227 lbs -> 224 lbs -> 223 lbs-> 213 lbs -> 212 lbs  -> 211 lbs -> 214 -> 218 lbs -> 200 lbs -> 206 lbs -> 201 lbs -> 196 lbs -> 182 lbs -> 182 lbs -> 184 lbs \par

## 2021-10-29 NOTE — ASSESSMENT
[FreeTextEntry1] : Small cell lung cancer - at least stage IIB\par PET/CT (6/20) No distant metastases\par 9/8/20 Brain MRI- No mets\par -Radiation finished 11/18/20\par s/p cisplatin etoposide x 4 cycles (9/16/20 - 11/20/20) with radiation followed by 2 cycles of carboplatin and etoposide (Carbo AUC 4 and dose reduce etoposide 10%)\par 1/15/21 Restaging PET/CT shows excellent response. 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 No targetable mutations\par completed WBRT and right hip radiation in June 2021 with Dr. Hidalgo\par C2 - Lurbinectidine was held due to thrombocytopenia plts < 100\par 7/22/21 - C3 - reducing Lurbinectidin by 15% due to counts.\par s/p C6  lurbinectidin keytruda (6/9/21- 9/29/21) \par POD\par s/p  topotecan x 5 days every 21 days  on 10/11/12 - now here for follow up\par \par Overall very deconditioned\par Diarrhea with Topotecan that finally resolved, now on constipating side.\par Labs reviewed, analyzed and discussed\par Patient's daughter is getting  on 11/12/21  - advised to come for office twice a week for close monitoring and for palliative support with hydration or transfusion as needed.  \par Liver enzymes have improved since started Topotecan. \par offering home blood drawn with lab fly next week but patient declined - will find somebody to drive him if his wife is not able to. \par \par #Bone mets - Xgeva given today 10/29/21\par \par #pancytopenia\par Anc 1.2 Hgb 7.8  Plts 23 \par s/p platelet transfusion on 10/22/2021\par 2-3 bruise on left hip - non-tender, no petechiae and denies any bleeding\par 1 unit  of PRBCs given for fatigue, weakness\par \par #Deconditioned/Social/Anxiety\par Patient lives with his wife and has lots of anxiety. He is seen very weak and gait instability, took much convincing for him to agree on using wheelchair to go to up infusion for blood transfusion.\par Stressed the importance of preventing any furhter injuries or falls due to increasing of bleeding from thrombocytopenia. \par Advised to meet up with Dr. Cintron/Ritu for palliative support. \par \par #Weight loss\par Likely related to POD\par Small frequent meals encouraged\par \par d/w Dr. Sanabria \par Follow up in 1 week or  straight to ED if any problems arise at home. Transfuse PRN\par CBC, CMP, CEA, type and screen

## 2021-10-29 NOTE — PHYSICAL EXAM
[Capable of only limited self care, confined to bed or chair more than 50% of waking hours] : Status 3- Capable of only limited self care, confined to bed or chair more than 50% of waking hours [Normal] : affect appropriate [de-identified] : walks with walker [de-identified] : Hepatomegaly. Pain right flank.  [de-identified] : scraping on his right knee

## 2021-10-29 NOTE — CONSULT LETTER
[Dear  ___] : Dear  [unfilled], [Courtesy Letter:] : I had the pleasure of seeing your patient, [unfilled], in my office today. [Please see my note below.] : Please see my note below. [Consult Closing:] : Thank you very much for allowing me to participate in the care of this patient.  If you have any questions, please do not hesitate to contact me. [Sincerely,] : Sincerely, [DrSudhakar  ___] : Dr. GOLDMAN [FreeTextEntry3] : Javier Sanabria MD, MPH\par Attending Physician\par Hematology Oncology\par [facility] [place]\par [facility]\par

## 2021-11-02 PROBLEM — R19.7 DIARRHEA: Status: ACTIVE | Noted: 2021-01-01

## 2021-11-02 PROBLEM — G62.0 CHEMOTHERAPY-INDUCED PERIPHERAL NEUROPATHY: Status: ACTIVE | Noted: 2020-11-18

## 2021-11-02 PROBLEM — D70.1 AGRANULOCYTOSIS SECONDARY TO CANCER CHEMOTHERAPY: Status: ACTIVE | Noted: 2020-10-07

## 2021-11-02 NOTE — HISTORY OF PRESENT ILLNESS
[de-identified] : Mr Potter is a very pleasant 65 years ex smoker (quit 6 months ago, 1 ppd x 40 years), seen today for further management of his small cell lung cancer\par \par He reports "flu like symptoms" starting in March 2020, saw his PCP who obtained a CXR which showed a lung nodule, subsequently had  CT chest revealed 2 RUL nodules (measuring 1.3 cm and 1.1 cm). He was sent to [facility] for biopsy, He reports that his procedure was postponed for multiple weeks, and eventually he was told that he need to see a thoracic surgeon\par He saw Dr Sarthak Fernández, who referred him to IR for image guided biopsy\par \par CT Chest 5/28/2020: Moderate to extensive centrilobular paraseptal emphysema predominantly within bilateral upper lobes. There are 2 adjacent nodular densities noted within the RUL measuring 1.3 cm and 1.1 cm. Evaluation of the rest of the lung fields demonstrates mild subsegmental atelectasis and scaring involving the lingula. There is a 0.2 cm peripheral nodule note in the RLL. Also, there is a 0.2 cm nodule noted involving the superior segment of the RLL. There is also a central nodule noted involving the EMBER which measures 0.3 cm in sized. There are no spiculated nodules. There is no consolidation. \par \par PET/CT 6/23/2020: Mild focal uptake in the 2 adjacent pulmonary nodules in the posterior RUL (max SUV 4.7). No additional FDG avid pulmonary nodules identified. Focally avid right hilar lymph node (max SUV 8.6). No abnormal uptake in the nonenlarged precarinal lymph node. Moderate sized hiatal hernia. No evidence of distant metastases\par \par s/p core biopsy of a right upper lobe lung mass (8/24/20)\par Pathology\par Small cell carcinoma. 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 He also reports that his house is from early 1900s and may have asbestos in the basement\par \par MRI brain (9/8/20)\par No evidence for intracranial metastatic disease.\par No acute infarction, acute intracranial hemorrhage, hydrocephalus or extra-axial fluid collection.\par Mild chronic microvascular ischemic changes.\par Mild opacification of the left mastoid air cells.\par \par \par He had a restaging PET/CT (1/15/21)\par Since June 23, 2020,\par 1.	Resolved hypermetabolic right upper lobe cancer and hypermetabolic right hilar adenopathy.\par 2.	New probably infectious/inflammatory changes in the right lung, probably treatment related.\par 3.	Slightly interval increase of probably reactive bone marrow uptake as described\par 4.             New FDG-avid serial fractures in the left lateral 4th - 6th ribs, posttraumatic. Minimal heterogeneous avidity throughout the bone marrow, with a few more distinct foci without CT correlate, for example in T3 vertebral body, T12 vertebral body, and right ilium, SUV 3.2, probably noise.\par \par Had MRI brain which showed diffuse brain mets. He was started on dexamethasone and his dose was increased to palliative WBRT\par \par PET/CT (5/21) showed new liver mets, lung mets, adrenal mets, bone mets\par \par He co right hip pain likely from acetabular met\par \par s/p liver biopsy with IR\par Path- SCLC\par \par \par MRI brain\par Marked interval resolution/decrease in size/enhancement of previously seen supratentorial and\par infratentorial intracranial metastases when compared to the May 2021 exam with most prominent lesion\par being a subcentimeter mildly enhancing left cerebellar lesion with evidence of chronic hemorrhage.\par The majority of previously described enhancing lesions on the May 2021 exam are no longer\par visualized.\par \par PET/CT (8/24/21)\par Mixed changes in several hypermetabolic pulmonary metastases, for example:\par Decreased medial right apex lesion, image 77, 0.8 cm versus prior 1.3 cm, SUV 2.6, prior 15.9\par Resolved uptake in several patchy opacity opacities in the right lower lobe, for example image 100, 1.3 x\par 0.8 cm\par Several new patchy opacities in the right upper and lower lobes as well as adjacent to new nodular pleural thickening anterior left upper lobe, presumable right upper lobe, image 104, 5.3 x 1.8 cm region, SUV 2.3\par New mildly hypermetabolic nodular pleural thickening anterior left upper lobe, for example image 85, 3.3 x 1.5 cm, SUV 2.6.\par Focal increased mixed changes in multiple increased hypermetabolic hepatic metastases, for example:\par –	Increased size and decreased FDG uptake in dominant segment 7 lesion, image 142, 8.1 x 6.8 cm, prior 5.9 x 4.7 cm, SUV 10.9, prior 24.9.\par –	Possibly resolved central segment 5 lesion.\par –	Multiple new lesions, for example posteriorly in segment 2, image 140, measuring 1.5 cm, SUV 8.7 \par New mild diffuse bone marrow uptake is probably reactive. Near resolution of few hypermetabolic osseous metastases, for example:\par –	Decreased uptake in right posterior element of L4, SUV 3.7, prior 7.9\par –	Resolved uptake in right iliac and anterior acetabular lesions. \par \par  [de-identified] : He is seen today for follow up\par He has started topotecan x 5 days (10/11/12)\par s/p C6  lurbinectidin keytruda (6/9/21- 9/29/21)\par Lurbinectidin was held on C2 due to thrombocytopenia \par s/p carboplatin etoposide x 6 (12/22/20 - 2/9/21)\par s/p C4 with Cisplatin Etoposide  (9/16/20 - 11/20/20) with radiation (9/23/20 - 11/18/20). Held 11/9/20 -11/16/20 for neutropenia\par Completed radiation (5/14/21 - 6/3/21 )- he had WBRT and right hip\par Accompanied by his daughter\par \par Daughters wedding is on 11/12/21\par He is preparing himself for the event.\par Diarrhea, n, vomiting almost resolved. \par His appetite is better. Has gained 2 lbs \par \par Weight - 249 -> 243 -> 244 lbs ->242 lbs -> 243 lbs -> 237 lbs ->238 ->.243 lbs ->239 lbs -> 231 lbs ->232 lbs ->228 lbs -> 229 -> 225 lbs -> 223 lbs -> 227 lbs -> 224 lbs -> 223 lbs-> 213 lbs -> 212 lbs  -> 211 lbs -> 214 -> 218 lbs -> 200 lbs -> 206 lbs -> 201 lbs -> 196 lbs -> 182 lbs -> 182 lbs -> 184 lbs -> 186 lbs\par

## 2021-11-02 NOTE — PHYSICAL EXAM
[Capable of only limited self care, confined to bed or chair more than 50% of waking hours] : Status 3- Capable of only limited self care, confined to bed or chair more than 50% of waking hours [Normal] : affect appropriate [de-identified] : walks with walker [de-identified] : Hepatomegaly. Pain right flank.  [de-identified] : scraping on his right knee

## 2021-11-02 NOTE — ASSESSMENT
[FreeTextEntry1] : Small cell lung cancer - at least stage IIB\par PET/CT (6/20) No distant metastases\par 9/8/20 Brain MRI- No mets\par -Radiation finished 11/18/20\par s/p cisplatin etoposide x 4 cycles (9/16/20 - 11/20/20) with radiation followed by 2 cycles of carboplatin and etoposide (Carbo AUC 4 and dose reduce etoposide 10%)\par 1/15/21 Restaging PET/CT shows excellent response. 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 No targetable mutations\par completed WBRT and right hip radiation in June 2021 with Dr. Hidalgo\par C2 - Lurbinectidine was held due to thrombocytopenia plts < 100\par 7/22/21 - C3 - reducing Lurbinectidin by 15% due to counts.\par s/p C6  lurbinectidin keytruda (6/9/21- 9/29/21) \par POD\par Started 3rd line topotecan x 5 days every 21 days  (10/11/21-present)\par Ideally should have started C2 this week\par However has persistent chemotherapy induced agranulocytosis\par No indication for transfusion\par Monitor for fever, bleeding. Hold off NSAIDs, ASA, blood thinners\par Hold off chemo for now\par Patient's daughter is getting  on 11/12/21  - advised to come for office twice a week for close monitoring and for palliative support with hydration or transfusion as needed.  \par Liver enzymes have improved since started Topotecan. \par Encouraged to pursue home PT\par \par #Bone mets - Xgeva given 10/29/21\par \par #Deconditioned/Social/Anxiety\par Patient lives with his wife and has lots of anxiety. He is seen very weak and gait instability, took much convincing for him to agree on using wheelchair to go to up infusion for blood transfusion.\par Stressed the importance of preventing any furhter injuries or falls due to increasing of bleeding from thrombocytopenia. \par Advised to meet up with Dr. Cintron/Ritu for palliative support. \par \par #Weight loss\par Likely related to POD\par Small frequent meals encouraged\par \par Follow up in few days \par CBC, CMP, CEA, type and screen

## 2021-11-02 NOTE — CONSULT LETTER
[Dear  ___] : Dear  [unfilled], [Courtesy Letter:] : I had the pleasure of seeing your patient, [unfilled], in my office today. [Please see my note below.] : Please see my note below. [Consult Closing:] : Thank you very much for allowing me to participate in the care of this patient.  If you have any questions, please do not hesitate to contact me. [Sincerely,] : Sincerely, [DrSudhakar  ___] : Dr. GOLDMAN [FreeTextEntry3] : Javier Sanabria MD, MPH\par Attending Physician\par Hematology Oncology\par [facility] [place]\par [facility]\par

## 2021-11-05 PROBLEM — R74.01 TRANSAMINITIS: Status: ACTIVE | Noted: 2021-01-01

## 2021-11-05 PROBLEM — R25.1 SHAKY: Status: ACTIVE | Noted: 2020-11-02

## 2021-11-05 NOTE — CONSULT LETTER
[Dear  ___] : Dear  [unfilled], [Courtesy Letter:] : I had the pleasure of seeing your patient, [unfilled], in my office today. [Please see my note below.] : Please see my note below. [Consult Closing:] : Thank you very much for allowing me to participate in the care of this patient.  If you have any questions, please do not hesitate to contact me. [Sincerely,] : Sincerely, [DrSudhakar  ___] : Dr. GOLDMAN [FreeTextEntry3] : Javier Sanabria MD, MPH\par Attending Physician\par Hematology Oncology\par [facility] [place]\par [facility]\par

## 2021-11-05 NOTE — PHYSICAL EXAM
[Capable of only limited self care, confined to bed or chair more than 50% of waking hours] : Status 3- Capable of only limited self care, confined to bed or chair more than 50% of waking hours [Normal] : affect appropriate [de-identified] : walks with cane/walker but now in wheelchair [de-identified] : Hepatomegaly.

## 2021-11-05 NOTE — HISTORY OF PRESENT ILLNESS
[de-identified] : Mr Potter is a very pleasant 65 years ex smoker (quit 6 months ago, 1 ppd x 40 years), seen today for further management of his small cell lung cancer\par \par He reports "flu like symptoms" starting in March 2020, saw his PCP who obtained a CXR which showed a lung nodule, subsequently had  CT chest revealed 2 RUL nodules (measuring 1.3 cm and 1.1 cm). He was sent to [facility] for biopsy, He reports that his procedure was postponed for multiple weeks, and eventually he was told that he need to see a thoracic surgeon\par He saw Dr Sarthak Fernández, who referred him to IR for image guided biopsy\par \par CT Chest 5/28/2020: Moderate to extensive centrilobular paraseptal emphysema predominantly within bilateral upper lobes. There are 2 adjacent nodular densities noted within the RUL measuring 1.3 cm and 1.1 cm. Evaluation of the rest of the lung fields demonstrates mild subsegmental atelectasis and scaring involving the lingula. There is a 0.2 cm peripheral nodule note in the RLL. Also, there is a 0.2 cm nodule noted involving the superior segment of the RLL. There is also a central nodule noted involving the EMBER which measures 0.3 cm in sized. There are no spiculated nodules. There is no consolidation. \par \par PET/CT 6/23/2020: Mild focal uptake in the 2 adjacent pulmonary nodules in the posterior RUL (max SUV 4.7). No additional FDG avid pulmonary nodules identified. Focally avid right hilar lymph node (max SUV 8.6). No abnormal uptake in the nonenlarged precarinal lymph node. Moderate sized hiatal hernia. No evidence of distant metastases\par \par s/p core biopsy of a right upper lobe lung mass (8/24/20)\par Pathology\par Small cell carcinoma. 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 He also reports that his house is from early 1900s and may have asbestos in the basement\par \par MRI brain (9/8/20)\par No evidence for intracranial metastatic disease.\par No acute infarction, acute intracranial hemorrhage, hydrocephalus or extra-axial fluid collection.\par Mild chronic microvascular ischemic changes.\par Mild opacification of the left mastoid air cells.\par \par \par He had a restaging PET/CT (1/15/21)\par Since June 23, 2020,\par 1.	Resolved hypermetabolic right upper lobe cancer and hypermetabolic right hilar adenopathy.\par 2.	New probably infectious/inflammatory changes in the right lung, probably treatment related.\par 3.	Slightly interval increase of probably reactive bone marrow uptake as described\par 4.             New FDG-avid serial fractures in the left lateral 4th - 6th ribs, posttraumatic. Minimal heterogeneous avidity throughout the bone marrow, with a few more distinct foci without CT correlate, for example in T3 vertebral body, T12 vertebral body, and right ilium, SUV 3.2, probably noise.\par \par Had MRI brain which showed diffuse brain mets. He was started on dexamethasone and his dose was increased to palliative WBRT\par \par PET/CT (5/21) showed new liver mets, lung mets, adrenal mets, bone mets\par \par He co right hip pain likely from acetabular met\par \par s/p liver biopsy with IR\par Path- SCLC\par \par \par MRI brain\par Marked interval resolution/decrease in size/enhancement of previously seen supratentorial and\par infratentorial intracranial metastases when compared to the May 2021 exam with most prominent lesion\par being a subcentimeter mildly enhancing left cerebellar lesion with evidence of chronic hemorrhage.\par The majority of previously described enhancing lesions on the May 2021 exam are no longer\par visualized.\par \par PET/CT (8/24/21)\par Mixed changes in several hypermetabolic pulmonary metastases, for example:\par Decreased medial right apex lesion, image 77, 0.8 cm versus prior 1.3 cm, SUV 2.6, prior 15.9\par Resolved uptake in several patchy opacity opacities in the right lower lobe, for example image 100, 1.3 x\par 0.8 cm\par Several new patchy opacities in the right upper and lower lobes as well as adjacent to new nodular pleural thickening anterior left upper lobe, presumable right upper lobe, image 104, 5.3 x 1.8 cm region, SUV 2.3\par New mildly hypermetabolic nodular pleural thickening anterior left upper lobe, for example image 85, 3.3 x 1.5 cm, SUV 2.6.\par Focal increased mixed changes in multiple increased hypermetabolic hepatic metastases, for example:\par –	Increased size and decreased FDG uptake in dominant segment 7 lesion, image 142, 8.1 x 6.8 cm, prior 5.9 x 4.7 cm, SUV 10.9, prior 24.9.\par –	Possibly resolved central segment 5 lesion.\par –	Multiple new lesions, for example posteriorly in segment 2, image 140, measuring 1.5 cm, SUV 8.7 \par New mild diffuse bone marrow uptake is probably reactive. Near resolution of few hypermetabolic osseous metastases, for example:\par –	Decreased uptake in right posterior element of L4, SUV 3.7, prior 7.9\par –	Resolved uptake in right iliac and anterior acetabular lesions. \par \par  [de-identified] : He is seen today for follow up\par He has started topotecan x 5 days (10/11/12)\par s/p C6  lurbinectidin keytruda (6/9/21- 9/29/21)\par Lurbinectidin was held on C2 due to thrombocytopenia \par s/p carboplatin etoposide x 6 (12/22/20 - 2/9/21)\par s/p C4 with Cisplatin Etoposide  (9/16/20 - 11/20/20) with radiation (9/23/20 - 11/18/20). Held 11/9/20 -11/16/20 for neutropenia\par Completed radiation (5/14/21 - 6/3/21 )- he had WBRT and right hip\par Accompanied by his daughter\par \par He's doing better, getting stronger - using cane at home but walker at the park. \par Minimal b/l lower ext edema. \par Mild constipation\par He's eating better \par \par Weight - 249 -> 243 -> 244 lbs ->242 lbs -> 243 lbs -> 237 lbs ->238 ->.243 lbs ->239 lbs -> 231 lbs ->232 lbs ->228 lbs -> 229 -> 225 lbs -> 223 lbs -> 227 lbs -> 224 lbs -> 223 lbs-> 213 lbs -> 212 lbs  -> 211 lbs -> 214 -> 218 lbs -> 200 lbs -> 206 lbs -> 201 lbs -> 196 lbs -> 182 lbs -> 182 lbs -> 184 lbs -> 186 lbs -> 184 lbs \par

## 2021-11-05 NOTE — RESULTS/DATA
[FreeTextEntry1] : Labs reviewed, analyzed and discussed\par 11/5/21 wbc 2.6 Hgb 8.5 hct 24.3 plts 53

## 2021-11-05 NOTE — ASSESSMENT
[FreeTextEntry1] : Small cell lung cancer - at least stage IIB\par PET/CT (6/20) No distant metastases\par 9/8/20 Brain MRI- No mets\par -Radiation finished 11/18/20\par s/p cisplatin etoposide x 4 cycles (9/16/20 - 11/20/20) with radiation followed by 2 cycles of carboplatin and etoposide (Carbo AUC 4 and dose reduce etoposide 10%)\par 1/15/21 Restaging PET/CT shows excellent response. 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 No targetable mutations\par completed WBRT and right hip radiation in June 2021 with Dr. Hidalgo\par C2 - Lurbinectidine was held due to thrombocytopenia plts < 100\par 7/22/21 - C3 - reducing Lurbinectidin by 15% due to counts.\par s/p C6  lurbinectidin keytruda (6/9/21- 9/29/21) \par POD\par Started 3rd line topotecan x 5 days every 21 days - cycle 1 on  (10/11/21) \par to start this week but on hold due anemia and thrombocytopenia - to hold until counts improved\par Hgb improved - No indication for transfusion\par Hold off NSAIDs, ASA, blood thinners\par Patient's daughter is getting  on 11/12/21  - advised to come for office twice a week for close monitoring and for palliative support with hydration or transfusion as needed.  \par Liver enzymes have improved since started Topotecan. \par Encouraged to pursue home PT\par clinically doing slightly better \par IVH given today\par \par #Bone mets - Xgeva given 10/29/21\par \par #Deconditioned/Social/Anxiety\par Patient lives with his wife, weak, and shakiness with anxiety \par Fall precautions stressed\par to continue with PT at home\par Advised to meet up with Dr. Cintron/Ritu for palliative support. \par \par #Weight loss\par Likely related to POD\par Small frequent meals encouraged\par \par d/w Dr. Sanabria \par Follow up in few days \par CBC, CMP, CEA, type and screen

## 2021-11-09 PROBLEM — C34.90 SMALL CELL LUNG CANCER: Status: ACTIVE | Noted: 2021-01-01

## 2021-11-10 PROBLEM — R63.4 WEIGHT LOSS, ABNORMAL: Status: ACTIVE | Noted: 2020-11-02

## 2021-11-10 PROBLEM — R53.81 PHYSICAL DECONDITIONING: Status: ACTIVE | Noted: 2021-01-01

## 2021-11-10 PROBLEM — D69.6 THROMBOCYTOPENIA: Status: ACTIVE | Noted: 2021-01-01

## 2021-11-10 PROBLEM — Z51.11 ENCOUNTER FOR ANTINEOPLASTIC CHEMOTHERAPY: Status: ACTIVE | Noted: 2020-09-16

## 2021-11-10 NOTE — PHYSICAL EXAM
[Capable of only limited self care, confined to bed or chair more than 50% of waking hours] : Status 3- Capable of only limited self care, confined to bed or chair more than 50% of waking hours [Normal] : affect appropriate [de-identified] : seen on wheelchair [de-identified] : Hepatomegaly.

## 2021-11-10 NOTE — CONSULT LETTER
[Dear  ___] : Dear  [unfilled], [Courtesy Letter:] : I had the pleasure of seeing your patient, [unfilled], in my office today. [Please see my note below.] : Please see my note below. [Consult Closing:] : Thank you very much for allowing me to participate in the care of this patient.  If you have any questions, please do not hesitate to contact me. [Sincerely,] : Sincerely, [DrSudhakar  ___] : Dr. GOLDMAN [FreeTextEntry3] : Javier Sanabria MD, MPH\par Attending Physician\par Hematology Oncology\par [facility] [place]\par [facility]\par

## 2021-11-10 NOTE — HISTORY OF PRESENT ILLNESS
[de-identified] : Mr Potter is a very pleasant 65 years ex smoker (quit 6 months ago, 1 ppd x 40 years), seen today for further management of his small cell lung cancer\par \par He reports "flu like symptoms" starting in March 2020, saw his PCP who obtained a CXR which showed a lung nodule, subsequently had  CT chest revealed 2 RUL nodules (measuring 1.3 cm and 1.1 cm). He was sent to [facility] for biopsy, He reports that his procedure was postponed for multiple weeks, and eventually he was told that he need to see a thoracic surgeon\par He saw Dr Sarthak Fernández, who referred him to IR for image guided biopsy\par \par CT Chest 5/28/2020: Moderate to extensive centrilobular paraseptal emphysema predominantly within bilateral upper lobes. There are 2 adjacent nodular densities noted within the RUL measuring 1.3 cm and 1.1 cm. Evaluation of the rest of the lung fields demonstrates mild subsegmental atelectasis and scaring involving the lingula. There is a 0.2 cm peripheral nodule note in the RLL. Also, there is a 0.2 cm nodule noted involving the superior segment of the RLL. There is also a central nodule noted involving the EMBER which measures 0.3 cm in sized. There are no spiculated nodules. There is no consolidation. \par \par PET/CT 6/23/2020: Mild focal uptake in the 2 adjacent pulmonary nodules in the posterior RUL (max SUV 4.7). No additional FDG avid pulmonary nodules identified. Focally avid right hilar lymph node (max SUV 8.6). No abnormal uptake in the nonenlarged precarinal lymph node. Moderate sized hiatal hernia. No evidence of distant metastases\par \par s/p core biopsy of a right upper lobe lung mass (8/24/20)\par Pathology\par Small cell carcinoma. 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 He also reports that his house is from early 1900s and may have asbestos in the basement\par \par MRI brain (9/8/20)\par No evidence for intracranial metastatic disease.\par No acute infarction, acute intracranial hemorrhage, hydrocephalus or extra-axial fluid collection.\par Mild chronic microvascular ischemic changes.\par Mild opacification of the left mastoid air cells.\par \par \par He had a restaging PET/CT (1/15/21)\par Since June 23, 2020,\par 1.	Resolved hypermetabolic right upper lobe cancer and hypermetabolic right hilar adenopathy.\par 2.	New probably infectious/inflammatory changes in the right lung, probably treatment related.\par 3.	Slightly interval increase of probably reactive bone marrow uptake as described\par 4.             New FDG-avid serial fractures in the left lateral 4th - 6th ribs, posttraumatic. Minimal heterogeneous avidity throughout the bone marrow, with a few more distinct foci without CT correlate, for example in T3 vertebral body, T12 vertebral body, and right ilium, SUV 3.2, probably noise.\par \par Had MRI brain which showed diffuse brain mets. He was started on dexamethasone and his dose was increased to palliative WBRT\par \par PET/CT (5/21) showed new liver mets, lung mets, adrenal mets, bone mets\par \par He co right hip pain likely from acetabular met\par \par s/p liver biopsy with IR\par Path- SCLC\par \par \par MRI brain\par Marked interval resolution/decrease in size/enhancement of previously seen supratentorial and\par infratentorial intracranial metastases when compared to the May 2021 exam with most prominent lesion\par being a subcentimeter mildly enhancing left cerebellar lesion with evidence of chronic hemorrhage.\par The majority of previously described enhancing lesions on the May 2021 exam are no longer\par visualized.\par \par PET/CT (8/24/21)\par Mixed changes in several hypermetabolic pulmonary metastases, for example:\par Decreased medial right apex lesion, image 77, 0.8 cm versus prior 1.3 cm, SUV 2.6, prior 15.9\par Resolved uptake in several patchy opacity opacities in the right lower lobe, for example image 100, 1.3 x\par 0.8 cm\par Several new patchy opacities in the right upper and lower lobes as well as adjacent to new nodular pleural thickening anterior left upper lobe, presumable right upper lobe, image 104, 5.3 x 1.8 cm region, SUV 2.3\par New mildly hypermetabolic nodular pleural thickening anterior left upper lobe, for example image 85, 3.3 x 1.5 cm, SUV 2.6.\par Focal increased mixed changes in multiple increased hypermetabolic hepatic metastases, for example:\par –	Increased size and decreased FDG uptake in dominant segment 7 lesion, image 142, 8.1 x 6.8 cm, prior 5.9 x 4.7 cm, SUV 10.9, prior 24.9.\par –	Possibly resolved central segment 5 lesion.\par –	Multiple new lesions, for example posteriorly in segment 2, image 140, measuring 1.5 cm, SUV 8.7 \par New mild diffuse bone marrow uptake is probably reactive. Near resolution of few hypermetabolic osseous metastases, for example:\par –	Decreased uptake in right posterior element of L4, SUV 3.7, prior 7.9\par –	Resolved uptake in right iliac and anterior acetabular lesions. \par \par  [de-identified] : He is seen today for follow up\par He has started topotecan x 5 days (10/11/12)\par s/p C6  lurbinectidin keytruda (6/9/21- 9/29/21)\par Lurbinectidin was held on C2 due to thrombocytopenia \par s/p carboplatin etoposide x 6 (12/22/20 - 2/9/21)\par s/p C4 with Cisplatin Etoposide  (9/16/20 - 11/20/20) with radiation (9/23/20 - 11/18/20). Held 11/9/20 -11/16/20 for neutropenia\par Completed radiation (5/14/21 - 6/3/21 )- he had WBRT and right hip\par Accompanied by his daughter\par \par He feels weak on his muscles\par Walks with a walker\par He has not yet pursued physical therapy\par \par He has started to notice change in the color of his urine\par Mild constipation\par He's eating better \par \par Weight - 249 -> 243 -> 244 lbs ->242 lbs -> 243 lbs -> 237 lbs ->238 ->.243 lbs ->239 lbs -> 231 lbs ->232 lbs ->228 lbs -> 229 -> 225 lbs -> 223 lbs -> 227 lbs -> 224 lbs -> 223 lbs-> 213 lbs -> 212 lbs  -> 211 lbs -> 214 -> 218 lbs -> 200 lbs -> 206 lbs -> 201 lbs -> 196 lbs -> 182 lbs -> 182 lbs -> 184 lbs -> 186 lbs -> 184 lbs -> \par

## 2021-11-15 PROBLEM — C78.7 METASTASIS TO LIVER OF UNKNOWN ORIGIN: Status: ACTIVE | Noted: 2021-01-01

## 2021-11-15 PROBLEM — C79.70 METASTASIS TO ADRENAL GLAND: Status: ACTIVE | Noted: 2021-01-01

## 2021-11-19 PROBLEM — C79.51 BONE METASTASES: Status: ACTIVE | Noted: 2021-01-01

## 2021-11-19 PROBLEM — G89.3 PAIN FROM BONE METASTASES: Status: ACTIVE | Noted: 2021-01-01

## 2021-11-19 PROBLEM — D64.9 ANEMIA: Status: ACTIVE | Noted: 2020-01-01

## 2021-11-19 PROBLEM — R41.82 ALTERED MENTAL STATUS: Status: ACTIVE | Noted: 2021-01-01

## 2021-11-19 PROBLEM — C34.90 SMALL CELL CARCINOMA OF LUNG: Status: ACTIVE | Noted: 2020-08-31

## 2021-11-19 PROBLEM — R16.0 HEPATOMEGALY: Status: ACTIVE | Noted: 2021-01-01

## 2021-11-19 PROBLEM — R53.83 FATIGUE: Status: ACTIVE | Noted: 2020-09-23

## 2021-11-19 PROBLEM — C79.31 BRAIN METASTASES: Status: ACTIVE | Noted: 2021-01-01

## 2021-11-19 NOTE — HISTORY OF PRESENT ILLNESS
[de-identified] : Mr Potter is a very pleasant 65 years ex smoker (quit 6 months ago, 1 ppd x 40 years), seen today for further management of his small cell lung cancer\par \par He reports "flu like symptoms" starting in March 2020, saw his PCP who obtained a CXR which showed a lung nodule, subsequently had  CT chest revealed 2 RUL nodules (measuring 1.3 cm and 1.1 cm). He was sent to [facility] for biopsy, He reports that his procedure was postponed for multiple weeks, and eventually he was told that he need to see a thoracic surgeon\par He saw Dr Sarthak Fernández, who referred him to IR for image guided biopsy\par \par CT Chest 5/28/2020: Moderate to extensive centrilobular paraseptal emphysema predominantly within bilateral upper lobes. There are 2 adjacent nodular densities noted within the RUL measuring 1.3 cm and 1.1 cm. Evaluation of the rest of the lung fields demonstrates mild subsegmental atelectasis and scaring involving the lingula. There is a 0.2 cm peripheral nodule note in the RLL. Also, there is a 0.2 cm nodule noted involving the superior segment of the RLL. There is also a central nodule noted involving the EMBER which measures 0.3 cm in sized. There are no spiculated nodules. There is no consolidation. \par \par PET/CT 6/23/2020: Mild focal uptake in the 2 adjacent pulmonary nodules in the posterior RUL (max SUV 4.7). No additional FDG avid pulmonary nodules identified. Focally avid right hilar lymph node (max SUV 8.6). No abnormal uptake in the nonenlarged precarinal lymph node. Moderate sized hiatal hernia. No evidence of distant metastases\par \par s/p core biopsy of a right upper lobe lung mass (8/24/20)\par Pathology\par Small cell carcinoma. 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 He also reports that his house is from early 1900s and may have asbestos in the basement\par \par MRI brain (9/8/20)\par No evidence for intracranial metastatic disease.\par No acute infarction, acute intracranial hemorrhage, hydrocephalus or extra-axial fluid collection.\par Mild chronic microvascular ischemic changes.\par Mild opacification of the left mastoid air cells.\par \par \par He had a restaging PET/CT (1/15/21)\par Since June 23, 2020,\par 1.	Resolved hypermetabolic right upper lobe cancer and hypermetabolic right hilar adenopathy.\par 2.	New probably infectious/inflammatory changes in the right lung, probably treatment related.\par 3.	Slightly interval increase of probably reactive bone marrow uptake as described\par 4.             New FDG-avid serial fractures in the left lateral 4th - 6th ribs, posttraumatic. Minimal heterogeneous avidity throughout the bone marrow, with a few more distinct foci without CT correlate, for example in T3 vertebral body, T12 vertebral body, and right ilium, SUV 3.2, probably noise.\par \par Had MRI brain which showed diffuse brain mets. He was started on dexamethasone and his dose was increased to palliative WBRT\par \par PET/CT (5/21) showed new liver mets, lung mets, adrenal mets, bone mets\par \par He co right hip pain likely from acetabular met\par \par s/p liver biopsy with IR\par Path- SCLC\par \par \par MRI brain\par Marked interval resolution/decrease in size/enhancement of previously seen supratentorial and\par infratentorial intracranial metastases when compared to the May 2021 exam with most prominent lesion\par being a subcentimeter mildly enhancing left cerebellar lesion with evidence of chronic hemorrhage.\par The majority of previously described enhancing lesions on the May 2021 exam are no longer\par visualized.\par \par PET/CT (8/24/21)\par Mixed changes in several hypermetabolic pulmonary metastases, for example:\par Decreased medial right apex lesion, image 77, 0.8 cm versus prior 1.3 cm, SUV 2.6, prior 15.9\par Resolved uptake in several patchy opacity opacities in the right lower lobe, for example image 100, 1.3 x\par 0.8 cm\par Several new patchy opacities in the right upper and lower lobes as well as adjacent to new nodular pleural thickening anterior left upper lobe, presumable right upper lobe, image 104, 5.3 x 1.8 cm region, SUV 2.3\par New mildly hypermetabolic nodular pleural thickening anterior left upper lobe, for example image 85, 3.3 x 1.5 cm, SUV 2.6.\par Focal increased mixed changes in multiple increased hypermetabolic hepatic metastases, for example:\par –	Increased size and decreased FDG uptake in dominant segment 7 lesion, image 142, 8.1 x 6.8 cm, prior 5.9 x 4.7 cm, SUV 10.9, prior 24.9.\par –	Possibly resolved central segment 5 lesion.\par –	Multiple new lesions, for example posteriorly in segment 2, image 140, measuring 1.5 cm, SUV 8.7 \par New mild diffuse bone marrow uptake is probably reactive. Near resolution of few hypermetabolic osseous metastases, for example:\par –	Decreased uptake in right posterior element of L4, SUV 3.7, prior 7.9\par –	Resolved uptake in right iliac and anterior acetabular lesions. \par \par  [de-identified] : He is seen today for follow up\par He has started topotecan x 5 days (10/11/12) - started 2 mg on 11/17/21 \par s/p C6  lurbinectidin keytruda (6/9/21- 9/29/21)\par Lurbinectidin was held on C2 due to thrombocytopenia \par s/p carboplatin etoposide x 6 (12/22/20 - 2/9/21)\par s/p C4 with Cisplatin Etoposide  (9/16/20 - 11/20/20) with radiation (9/23/20 - 11/18/20). Held 11/9/20 -11/16/20 for neutropenia\par Completed radiation (5/14/21 - 6/3/21 )- he had WBRT and right hip\par Accompanied by his wife\par \par Patient is severely deconditioning with extreme weakness, fatigue, altered mental status, and Jaundice. He has not been eating or drinking.  \par BP: 89/55\par worsening of abdominal and right hip\par \par Weight - 249 -> 243 -> 244 lbs ->242 lbs -> 243 lbs -> 237 lbs ->238 ->.243 lbs ->239 lbs -> 231 lbs ->232 lbs ->228 lbs -> 229 -> 225 lbs -> 223 lbs -> 227 lbs -> 224 lbs -> 223 lbs-> 213 lbs -> 212 lbs  -> 211 lbs -> 214 -> 218 lbs -> 200 lbs -> 206 lbs -> 201 lbs -> 196 lbs -> 182 lbs -> 182 lbs -> 184 lbs -> 186 lbs -> 184 lbs -> \par

## 2021-11-19 NOTE — ASSESSMENT
[FreeTextEntry1] : Small cell lung cancer - at least stage IIB\par PET/CT (6/20) No distant metastases\par 9/8/20 Brain MRI- No mets\par -Radiation finished 11/18/20\par s/p cisplatin etoposide x 4 cycles (9/16/20 - 11/20/20) with radiation followed by 2 cycles of carboplatin and etoposide (Carbo AUC 4 and dose reduce etoposide 10%)\par 1/15/21 Restaging PET/CT shows excellent response. 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 No targetable mutations\par completed WBRT and right hip radiation in June 2021 with Dr. Hidalgo\par C2 - Lurbinectidine was held due to thrombocytopenia plts < 100\par 7/22/21 - C3 - reducing Lurbinectidin by 15% due to counts.\par s/p C6  lurbinectidin keytruda (6/9/21- 9/29/21) \par POD\par Started 3rd line topotecan x 5 days every 21 days - cycle 1 on  (10/11/21) \par Seen today for follow up\par Has been trying to do best to attend his daughter's wedding on 11/12/21\par Did not pursue physical therapy\par Labs reviewed, analyzed and discussed\par Resumed topotecan 2 mg QD x 5 days on 11/17/21 - To HOLD for now.\par Patient to ER for support care - altered mental status, jaundice, extreme weakness, poor PO intake. \par Palliative Care [facility]/Dr. Keane is notified to see patient. \par \par #Bone mets - Xgeva given 10/29/21\par \par #Deconditioned/Social/Anxiety\par Patient lives with his wife, weak, and shakiness with anxiety \par Fall precautions stressed\par to continue with PT at home\par Advised to meet up with Dr. Cintron/Ritu for palliative support. \par \par #Weight loss\par Likely related to POD\par Small frequent meals encouraged\par \par d/w Dr. Sanabria \par Follow up \par CBC, CMP, type and screen

## 2021-11-19 NOTE — PHYSICAL EXAM
[Capable of only limited self care, confined to bed or chair more than 50% of waking hours] : Status 3- Capable of only limited self care, confined to bed or chair more than 50% of waking hours [Normal] : affect appropriate [Cachectic] : cachectic [de-identified] : seen on wheelchair, sluggished, confused, jaundice [de-identified] : Hepatomegaly. hard and distended

## 2021-11-19 NOTE — RESULTS/DATA
[FreeTextEntry1] : Labs reviewed, analyzed and discussed\par 11/19/21 wbc 3.3 hgb 8.6 hct 26.1 plt 85

## 2021-11-19 NOTE — CONSULT LETTER
[Dear  ___] : Dear  [unfilled], [Courtesy Letter:] : I had the pleasure of seeing your patient, [unfilled], in my office today. [Please see my note below.] : Please see my note below. [Consult Closing:] : Thank you very much for allowing me to participate in the care of this patient.  If you have any questions, please do not hesitate to contact me. [Sincerely,] : Sincerely, [DrSudhakar  ___] : Dr. GOLDMAN [FreeTextEntry3] : Javier Sanabria MD, MPH\par Attending Physician\par Hematology Oncology\par [facility]\par [facility]\par

## 2022-04-11 PROBLEM — Z11.59 SCREENING FOR VIRAL DISEASE: Status: ACTIVE | Noted: 2020-01-01

## 2022-05-04 NOTE — HISTORY OF PRESENT ILLNESS
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

## 2024-05-11 NOTE — RESULTS/DATA
[de-identified] : WDWN in NAD HEENT:  unremarkable Neck:  supple, no JVD, no LN Lungs:  CTA B/L, no W/R/R Heart:  Reg rate, +S1S2, no M/R/G Abdomen:  soft, NT, ND, +BS, no masses, no HS-megaly Genital: No pubic or genital lesions noted. Ext:  no C/C/E Neuro:  no focal deficits [FreeTextEntry1] : Labs and images reviewed and discussed\par

## 2024-07-31 NOTE — RESULTS/DATA
No [FreeTextEntry1] : Labs and images reviewed and discussed\par 11/6/20 wbc 1.47 anc 1.08 hgb 8.8 hct 24.5 plts 93\par Mg - 1.9\par BUN/Cr - 35/2.1

## 2024-08-12 NOTE — PHYSICAL EXAM
Patient is started on IV Levaquin and IV Flagyl-now on rocephin and flagyl.  Solumedrol 30mg q12h  UC flare suspected-GI consulted  May have endoscopy Tuesday  Currently seems to be tolerating regular diet         [Restricted in physically strenuous activity but ambulatory and able to carry out work of a light or sedentary nature] : Status 1- Restricted in physically strenuous activity but ambulatory and able to carry out work of a light or sedentary nature, e.g., light house work, office work [Normal] : affect appropriate